# Patient Record
Sex: FEMALE | Race: WHITE
[De-identification: names, ages, dates, MRNs, and addresses within clinical notes are randomized per-mention and may not be internally consistent; named-entity substitution may affect disease eponyms.]

---

## 2017-02-01 ENCOUNTER — HOSPITAL ENCOUNTER (OUTPATIENT)
Dept: HOSPITAL 75 - LDRP | Age: 27
Discharge: HOME | End: 2017-02-01
Attending: HOSPITALIST
Payer: MEDICAID

## 2017-02-01 VITALS — WEIGHT: 205 LBS | HEIGHT: 66.5 IN | BODY MASS INDEX: 32.56 KG/M2

## 2017-02-01 VITALS — DIASTOLIC BLOOD PRESSURE: 66 MMHG | SYSTOLIC BLOOD PRESSURE: 124 MMHG

## 2017-02-01 DIAGNOSIS — O26.93: Primary | ICD-10-CM

## 2017-02-01 DIAGNOSIS — Z3A.30: ICD-10-CM

## 2017-02-01 DIAGNOSIS — R10.2: ICD-10-CM

## 2017-02-01 PROCEDURE — 96361 HYDRATE IV INFUSION ADD-ON: CPT

## 2017-02-01 PROCEDURE — 96360 HYDRATION IV INFUSION INIT: CPT

## 2017-02-01 PROCEDURE — 99213 OFFICE O/P EST LOW 20 MIN: CPT

## 2017-02-02 NOTE — PHYSICIAN QUERY-FINAL DX
DUSTIN LEAHY 2/2/17 0939:


Clinic Account Progress/Dx


Physician Query:


Please give diagnosis


Date of Service


Feb 1, 2017 at 16:06





MARCELO OLIVEIRA MD 2/2/17 1800:


Clinic Account Progress/Dx


DIAGNOSIS:


Diagnosis


Pelvic pressure in pregnancy








DUSTIN LEAHY Feb 2, 2017 09:39


MARCELO OLIVEIRA MD Feb 2, 2017 18:00

## 2017-03-18 ENCOUNTER — HOSPITAL ENCOUNTER (OUTPATIENT)
Dept: HOSPITAL 75 - WSO | Age: 27
Discharge: HOME | End: 2017-03-18
Attending: HOSPITALIST
Payer: MEDICAID

## 2017-03-18 VITALS — WEIGHT: 216.05 LBS | BODY MASS INDEX: 34.31 KG/M2 | HEIGHT: 66.5 IN

## 2017-03-18 VITALS — DIASTOLIC BLOOD PRESSURE: 75 MMHG | SYSTOLIC BLOOD PRESSURE: 144 MMHG

## 2017-03-18 VITALS — DIASTOLIC BLOOD PRESSURE: 81 MMHG | SYSTOLIC BLOOD PRESSURE: 134 MMHG

## 2017-03-18 VITALS — DIASTOLIC BLOOD PRESSURE: 67 MMHG | SYSTOLIC BLOOD PRESSURE: 125 MMHG

## 2017-03-18 VITALS — DIASTOLIC BLOOD PRESSURE: 70 MMHG | SYSTOLIC BLOOD PRESSURE: 123 MMHG

## 2017-03-18 VITALS — SYSTOLIC BLOOD PRESSURE: 125 MMHG | DIASTOLIC BLOOD PRESSURE: 67 MMHG

## 2017-03-18 DIAGNOSIS — Z3A.37: ICD-10-CM

## 2017-03-18 DIAGNOSIS — O47.1: Primary | ICD-10-CM

## 2017-03-18 PROCEDURE — 99213 OFFICE O/P EST LOW 20 MIN: CPT

## 2017-03-18 NOTE — XMS REPORT
Continuity of Care Document

 Created on: 2016



FARHAD SIMS

External Reference #: [id number]

: 1990

Sex: Female



Demographics







 Address  [address] [place]  [address]

 

 Home Phone  [phone]

 

 Preferred Language  English

 

 Marital Status  Unknown

 

 Shinto Affiliation  Unknown

 

 Race  Unknown

 

 Ethnic Group  Unknown





Author







 Author  [facility]

 

 Organization  [facility]

 

 Address  Unknown

 

 Phone  Unavailable







Support







 Name  Relationship  Address  Phone

 

 EVAN ZACARIAS  Caregiver  [facility]

 [address] [place]  [address] [phone]

 

 MARCELO OLIVEIRA MD  Caregiver  [address] [place]  [address]  [phone]

 

 NO, LOCAL PHYSICIAN  Caregiver  Unknown  Unavailable

 

 FREDIS RUIZ DO  Caregiver  [facility]

[place]  [address] [phone]

 

 IVY BURRIS  Next Of Kin  [address] [place]  [address] [phone]







Care Team Providers







 Care Team Member Name  Role  Phone

 

 MARCELO OLIVEIRA MD  PCP  [phone]







Insurance Providers







 Payer Name  Policy Number  Subscriber Name  Relationship

 

 Self Pay     Farhad Sims  18 Self / Same As Patient







Advance Directives







 Directive  Response  Recorded Date/Time

 

 Advance Directives  No  08/15/16 8:25pm

 

 Health Care Power of   No  08/15/16 8:25pm







Chief Complaint and Reason for Visit







 Chief Complaint  Cough/Cold/Flu Symptoms

 

 Reason for Visit  Upper respiratory infection







Problems

Active Problems







 Medical Problem  Onset Date  Status

 

 Lower back pain  Unknown  Acute

 

 Pelvic pain  Unknown  Acute

 

 Upper respiratory infection  Unknown  Acute

 

 Urinary tract infection  Unknown  Acute







Medications

Current Home Medications







 Medication  Dose  Units  Route  Directions  Days/Qty  Instructions  Start Date

 

 Sertraline Hcl 50 Mg                    08

 

 [Implanon ]                    08

 

 Cephalexin 500 Mg  500  Mg  Oral  Four Times Daily  28     08/15/16

 

 Azithromycin 250 Mg  250  Mg  Oral  As Directed  6  2 tablets by mouth on day 1
, then 1 tablet daily on each of the following 4 days  16

 

 Diphenhydramine Hcl 25 Mg  25  Mg  Oral  Every 6 Hours as needed for 
Congestion  30     16







Social History







 Social History Problem  Response  Recorded Date/Time

 

 Alcohol Use  Denies Use  2016 2:33pm

 

 Recreational Drug Use  No  2016 2:33pm

 

 Recent Foreign Travel  No  2016 7:38pm

 

 Recent Infectious Disease Exposure  No  2016 7:38pm

 

 Hospitalization with Isolation  Denies  2016 7:38pm

 

 Type Used  Cigarettes  08/15/2016 8:25pm

 

 Recent Hopitalizations  No  2016 7:41pm

 

 Hospitalization with Isolation  Denies  2016 7:38pm







Hospital Discharge Instructions

No hospital discharge instructions.



Plan of Care







 Discharge Date  16 8:16pm

 

 Disposition  01 HOME, SELF-CARE

 

 Condition at Discharge  Improved

 

 Instructions/Education Provided  Upper Respiratory Infection (ED)

 

 Prescriptions  See Medication Section

 

 Referrals  MARCELO OLIVEIRA MD - Primary Care Physician





NO,LOCAL PHYSICIAN - Primary Care Physician

 

 Additional Instructions/Education  1.  Medication as directed  

 2.  Follow-up with your obstetrician next week  

 3.  All discharge instructions reviewed with patient and/or family. Voiced 

understanding.







Functional Status

No functional status results.



Allergies, Adverse Reactions, Alerts

No known allergies.



Immunizations

No immunization records.



Vital Signs

Acute Vital Signs





 Vital  Response  Date/Time

 

 Temperature (Fahrenheit)  98.9 degrees F (97.6 - 99.5)  08/15/2016 9:22pm

 

 Temperature (Calculated Celsius)  37.64353 degrees C (36.4 - 37.5)  08/15/2016 
9:22pm

 

 Temperature Source  Temporal  08/15/2016 9:22pm

 

 Pulse Rate (adult)  101 bpm (60 - 90)  2016 7:38pm

 

 Respiratory Rate  18 bpm (12 - 24)  2016 7:38pm

 

 O2 Sat by Pulse Oximetry  99 % (88 - 100)  2016 7:38pm

 

 Blood Pressure  126/80 mm Hg  2016 7:38pm

 

    Blood Pressure Mean  95 mm Hg  2016 7:38pm

 

 Pain      

 

    Numeric Pain Scale  4  2016 7:38pm

 

 Height (Feet)  5 feet  2016 7:38pm

 

 Height (Inches)  6 inches  2016 7:38pm

 

 Height (Calculated Centimeters)  167.399382 cm  2016 7:38pm

 

 Weight (Pounds)  164 pounds  2016 7:38pm

 

 Weight (Calculated Grams)  32239.780 gm  08/15/2016 8:25pm

 

 Weight (Calculated Kilograms)  74.614762 kilograms  2016 7:38pm

 

 Capillary Refill      

 

    Capillary Refill  Less Than 3 Seconds  2016 7:38pm

 

 Height  5 ft 6 in   

 

 Weight  164 lb   

 

 Body Mass Index  26.5 kg/m^2   







Results

Laboratory Results







 Test Name  Result  Units  Flags  Reference  Collection Date/Time  Result Date/
Time  Comments

 

 Urine Color  YELLOW           08/15/2016 8:24pm  08/15/2016 8:44pm   

 

 Urine Clarity  CLEAR           08/15/2016 8:24pm  08/15/2016 8:44pm   

 

 Urine pH  7        5-9  08/15/2016 8:24pm  08/15/2016 8:44pm   

 

 Urine Specific Gravity  1.005     *  1.016-1.022  08/15/2016 8:24pm  08/15/
2016 8:44pm   

 

 Urine Protein  1+     *  NEGATIVE  08/15/2016 8:24pm  08/15/2016 8:44pm   

 

 Urine Glucose (UA)  NEGATIVE        NEGATIVE  08/15/2016 8:24pm  08/15/2016 8:
44pm   

 

 Urine RBC (Auto)  5+     *  NEGATIVE  08/15/2016 8:24pm  08/15/2016 8:44pm   

 

 Urine Ketones  NEGATIVE        NEGATIVE  08/15/2016 8:24pm  08/15/2016 8:44pm 
  

 

 Urine Nitrite  NEGATIVE        NEGATIVE  08/15/2016 8:24pm  08/15/2016 8:44pm 
  

 

 Urine Bilirubin  NEGATIVE        NEGATIVE  08/15/2016 8:24pm  08/15/2016 8:
44pm   

 

 Urine Urobilinogen  NORMAL  MG/DL     NORMAL  08/15/2016 8:24pm  08/15/2016 8:
44pm   

 

 Urine Leukocyte Esterase  3+     *  NEGATIVE  08/15/2016 8:24pm  08/15/2016 8:
44pm   

 

 Urine RBC  2-5  /HPF  *     08/15/2016 8:24pm  08/15/2016 8:44pm   

 

 Urine WBC  25-50  /HPF  *     08/15/2016 8:24pm  08/15/2016 8:44pm   

 

 Urine Bacteria  TRACE  /HPF        08/15/2016 8:24pm  08/15/2016 8:44pm   

 

 Urine Squamous Epithelial Cells  0-2  /HPF        08/15/2016 8:24pm  08/15/
2016 8:44pm   

 

 Urine Crystals  NONE  /LPF        08/15/2016 8:24pm  08/15/2016 8:44pm   

 

 Urine Casts  NONE  /LPF        08/15/2016 8:24pm  08/15/2016 8:44pm   

 

 Urine Mucus  NEGATIVE  /LPF        08/15/2016 8:24pm  08/15/2016 8:44pm   

 

 Urine Culture Indicated  YES           08/15/2016 8:24pm  08/15/2016 8:44pm   





Microbiology Results







 Procedure  Source  Result  Collection Date/Time  Result Date/Time

 

 Urine Culture  Urine, Clean Catch  PROTEUS MIRABILIS  08/15/2016 8:24pm  2016 1:28pm







Procedures

No known history of procedures.



Encounters







 Encounter  Location  Arrival/Admit Date  Discharge/Depart Date  Attending 
Provider

 

 Departed Emergency Room  [facility]  16 7:08pm   8:16pm  EVAN ZACARIAS

 

 Departed Emergency Room  [facility]  08/15/16 8:19pm  08/15
/16 9:22pm  ANGEL WASHINGTON MD











 Recent Diagnosis

## 2017-03-20 NOTE — PHYSICIAN QUERY-FINAL DX
FAWAD HERRING 3/20/17 1443:


Clinic Account Progress/Dx


Physician Query:


Please give diagnosis


Date of Service


Mar 18, 2017 at 16:07





MARCELO OLIVEIRA MD 3/21/17 0924:


Clinic Account Progress/Dx


DIAGNOSIS:


Diagnosis


Contractions, third trimester pregnancy








FAWAD HERRING Mar 20, 2017 14:43


MARCELO OLIVEIRA MD Mar 21, 2017 09:24

## 2017-03-24 ENCOUNTER — HOSPITAL ENCOUNTER (OUTPATIENT)
Dept: HOSPITAL 75 - WSO | Age: 27
Discharge: HOME | End: 2017-03-24
Attending: OBSTETRICS & GYNECOLOGY
Payer: MEDICAID

## 2017-03-24 VITALS — DIASTOLIC BLOOD PRESSURE: 70 MMHG | SYSTOLIC BLOOD PRESSURE: 124 MMHG

## 2017-03-24 VITALS — HEIGHT: 66.5 IN | BODY MASS INDEX: 35.33 KG/M2 | WEIGHT: 222.5 LBS

## 2017-03-24 DIAGNOSIS — Z3A.38: ICD-10-CM

## 2017-03-24 DIAGNOSIS — O47.1: Primary | ICD-10-CM

## 2017-03-24 LAB
BILIRUB UR QL STRIP: NEGATIVE
KETONES UR QL STRIP: NEGATIVE
LEUKOCYTE ESTERASE UR QL STRIP: NEGATIVE
NITRITE UR QL STRIP: NEGATIVE
PH UR STRIP: 6 [PH] (ref 5–9)
PROT UR QL STRIP: NEGATIVE
SP GR UR STRIP: 1.01 (ref 1.02–1.02)
SQUAMOUS #/AREA URNS HPF: (no result) /HPF
UROBILINOGEN UR-MCNC: NORMAL MG/DL
WBC #/AREA URNS HPF: (no result) /HPF

## 2017-03-24 PROCEDURE — 99213 OFFICE O/P EST LOW 20 MIN: CPT

## 2017-03-24 PROCEDURE — 81000 URINALYSIS NONAUTO W/SCOPE: CPT

## 2017-03-27 NOTE — PHYSICIAN QUERY-FINAL DX
FAWAD HERRING 3/27/17 1351:


Clinic Account Progress/Dx


Physician Query:


Please give diagnosis


Date of Service


Mar 24, 2017 at 00:43





KAY NEWMAN DO 3/27/17 1641:


Clinic Account Progress/Dx


DIAGNOSIS:


Diagnosis


38 week IUP


Uterine Contractions











FAWAD HERRING Mar 27, 2017 13:51


KAY NEWMAN DO Mar 27, 2017 16:41

## 2017-04-09 ENCOUNTER — HOSPITAL ENCOUNTER (INPATIENT)
Dept: HOSPITAL 75 - LDRP | Age: 27
LOS: 3 days | Discharge: HOME | End: 2017-04-12
Attending: HOSPITALIST | Admitting: HOSPITALIST
Payer: MEDICAID

## 2017-04-09 VITALS — SYSTOLIC BLOOD PRESSURE: 123 MMHG | DIASTOLIC BLOOD PRESSURE: 58 MMHG

## 2017-04-09 VITALS — DIASTOLIC BLOOD PRESSURE: 62 MMHG | SYSTOLIC BLOOD PRESSURE: 127 MMHG

## 2017-04-09 VITALS — DIASTOLIC BLOOD PRESSURE: 74 MMHG | SYSTOLIC BLOOD PRESSURE: 128 MMHG

## 2017-04-09 VITALS — SYSTOLIC BLOOD PRESSURE: 130 MMHG | DIASTOLIC BLOOD PRESSURE: 71 MMHG

## 2017-04-09 VITALS — DIASTOLIC BLOOD PRESSURE: 76 MMHG | SYSTOLIC BLOOD PRESSURE: 131 MMHG

## 2017-04-09 VITALS — WEIGHT: 225 LBS | BODY MASS INDEX: 35.73 KG/M2 | HEIGHT: 66.5 IN

## 2017-04-09 VITALS — DIASTOLIC BLOOD PRESSURE: 65 MMHG | SYSTOLIC BLOOD PRESSURE: 123 MMHG

## 2017-04-09 VITALS — DIASTOLIC BLOOD PRESSURE: 66 MMHG | SYSTOLIC BLOOD PRESSURE: 117 MMHG

## 2017-04-09 VITALS — DIASTOLIC BLOOD PRESSURE: 70 MMHG | SYSTOLIC BLOOD PRESSURE: 126 MMHG

## 2017-04-09 DIAGNOSIS — O48.0: Primary | ICD-10-CM

## 2017-04-09 DIAGNOSIS — O99.013: ICD-10-CM

## 2017-04-09 DIAGNOSIS — Z87.891: ICD-10-CM

## 2017-04-09 DIAGNOSIS — Z3A.40: ICD-10-CM

## 2017-04-09 DIAGNOSIS — D50.9: ICD-10-CM

## 2017-04-09 LAB
BASOPHILS # BLD AUTO: 0 10^3/UL (ref 0–0.1)
BASOPHILS NFR BLD AUTO: 0 % (ref 0–10)
EOSINOPHIL # BLD AUTO: 0.3 10^3/UL (ref 0–0.3)
EOSINOPHIL NFR BLD AUTO: 2 % (ref 0–10)
ERYTHROCYTE [DISTWIDTH] IN BLOOD BY AUTOMATED COUNT: 14 % (ref 10–14.5)
LYMPHOCYTES # BLD AUTO: 2.2 X 10^3 (ref 1–4)
LYMPHOCYTES NFR BLD AUTO: 18 % (ref 12–44)
MCH RBC QN AUTO: 26 PG (ref 25–34)
MCHC RBC AUTO-ENTMCNC: 32 G/DL (ref 32–36)
MCV RBC AUTO: 83 FL (ref 80–99)
MONOCYTES # BLD AUTO: 1.1 X 10^3 (ref 0–1)
MONOCYTES NFR BLD AUTO: 9 % (ref 0–12)
NEUTROPHILS # BLD AUTO: 8.7 X 10^3 (ref 1.8–7.8)
NEUTROPHILS NFR BLD AUTO: 71 % (ref 42–75)
PLATELET # BLD: 209 10^3/UL (ref 130–400)
PMV BLD AUTO: 12.3 FL (ref 7.4–10.4)
RBC # BLD AUTO: 4.06 10^6/UL (ref 4.35–5.85)
WBC # BLD AUTO: 12.3 10^3/UL (ref 4.3–11)

## 2017-04-09 PROCEDURE — 36415 COLL VENOUS BLD VENIPUNCTURE: CPT

## 2017-04-09 PROCEDURE — 88307 TISSUE EXAM BY PATHOLOGIST: CPT

## 2017-04-09 PROCEDURE — 86850 RBC ANTIBODY SCREEN: CPT

## 2017-04-09 PROCEDURE — 86901 BLOOD TYPING SEROLOGIC RH(D): CPT

## 2017-04-09 PROCEDURE — 86900 BLOOD TYPING SEROLOGIC ABO: CPT

## 2017-04-09 PROCEDURE — 85025 COMPLETE CBC W/AUTO DIFF WBC: CPT

## 2017-04-09 RX ADMIN — SODIUM CHLORIDE, SODIUM LACTATE, POTASSIUM CHLORIDE, AND CALCIUM CHLORIDE SCH MLS/HR: 600; 310; 30; 20 INJECTION, SOLUTION INTRAVENOUS at 22:10

## 2017-04-09 RX ADMIN — MISOPROSTOL SCH MCG: 100 TABLET ORAL at 21:05

## 2017-04-09 RX ADMIN — SODIUM CHLORIDE, SODIUM LACTATE, POTASSIUM CHLORIDE, AND CALCIUM CHLORIDE SCH MLS/HR: 600; 310; 30; 20 INJECTION, SOLUTION INTRAVENOUS at 20:55

## 2017-04-10 VITALS — DIASTOLIC BLOOD PRESSURE: 52 MMHG | SYSTOLIC BLOOD PRESSURE: 109 MMHG

## 2017-04-10 VITALS — DIASTOLIC BLOOD PRESSURE: 65 MMHG | SYSTOLIC BLOOD PRESSURE: 135 MMHG

## 2017-04-10 VITALS — DIASTOLIC BLOOD PRESSURE: 56 MMHG | SYSTOLIC BLOOD PRESSURE: 115 MMHG

## 2017-04-10 VITALS — DIASTOLIC BLOOD PRESSURE: 57 MMHG | SYSTOLIC BLOOD PRESSURE: 109 MMHG

## 2017-04-10 VITALS — DIASTOLIC BLOOD PRESSURE: 72 MMHG | SYSTOLIC BLOOD PRESSURE: 120 MMHG

## 2017-04-10 VITALS — DIASTOLIC BLOOD PRESSURE: 58 MMHG | SYSTOLIC BLOOD PRESSURE: 113 MMHG

## 2017-04-10 VITALS — DIASTOLIC BLOOD PRESSURE: 61 MMHG | SYSTOLIC BLOOD PRESSURE: 122 MMHG

## 2017-04-10 VITALS — SYSTOLIC BLOOD PRESSURE: 129 MMHG | DIASTOLIC BLOOD PRESSURE: 68 MMHG

## 2017-04-10 VITALS — SYSTOLIC BLOOD PRESSURE: 117 MMHG | DIASTOLIC BLOOD PRESSURE: 79 MMHG

## 2017-04-10 VITALS — SYSTOLIC BLOOD PRESSURE: 141 MMHG | DIASTOLIC BLOOD PRESSURE: 86 MMHG

## 2017-04-10 VITALS — SYSTOLIC BLOOD PRESSURE: 111 MMHG | DIASTOLIC BLOOD PRESSURE: 75 MMHG

## 2017-04-10 VITALS — DIASTOLIC BLOOD PRESSURE: 56 MMHG | SYSTOLIC BLOOD PRESSURE: 114 MMHG

## 2017-04-10 VITALS — SYSTOLIC BLOOD PRESSURE: 115 MMHG | DIASTOLIC BLOOD PRESSURE: 60 MMHG

## 2017-04-10 VITALS — DIASTOLIC BLOOD PRESSURE: 75 MMHG | SYSTOLIC BLOOD PRESSURE: 133 MMHG

## 2017-04-10 VITALS — SYSTOLIC BLOOD PRESSURE: 108 MMHG | DIASTOLIC BLOOD PRESSURE: 55 MMHG

## 2017-04-10 VITALS — DIASTOLIC BLOOD PRESSURE: 60 MMHG | SYSTOLIC BLOOD PRESSURE: 127 MMHG

## 2017-04-10 VITALS — SYSTOLIC BLOOD PRESSURE: 124 MMHG | DIASTOLIC BLOOD PRESSURE: 56 MMHG

## 2017-04-10 VITALS — SYSTOLIC BLOOD PRESSURE: 119 MMHG | DIASTOLIC BLOOD PRESSURE: 56 MMHG

## 2017-04-10 VITALS — SYSTOLIC BLOOD PRESSURE: 128 MMHG | DIASTOLIC BLOOD PRESSURE: 80 MMHG

## 2017-04-10 VITALS — SYSTOLIC BLOOD PRESSURE: 112 MMHG | DIASTOLIC BLOOD PRESSURE: 60 MMHG

## 2017-04-10 VITALS — SYSTOLIC BLOOD PRESSURE: 114 MMHG | DIASTOLIC BLOOD PRESSURE: 56 MMHG

## 2017-04-10 VITALS — SYSTOLIC BLOOD PRESSURE: 117 MMHG | DIASTOLIC BLOOD PRESSURE: 62 MMHG

## 2017-04-10 VITALS — DIASTOLIC BLOOD PRESSURE: 62 MMHG | SYSTOLIC BLOOD PRESSURE: 121 MMHG

## 2017-04-10 RX ADMIN — SODIUM CHLORIDE, SODIUM LACTATE, POTASSIUM CHLORIDE, AND CALCIUM CHLORIDE SCH MLS/HR: 600; 310; 30; 20 INJECTION, SOLUTION INTRAVENOUS at 08:14

## 2017-04-10 RX ADMIN — SODIUM CHLORIDE, SODIUM LACTATE, POTASSIUM CHLORIDE, AND CALCIUM CHLORIDE SCH MLS/HR: 600; 310; 30; 20 INJECTION, SOLUTION INTRAVENOUS at 04:52

## 2017-04-10 RX ADMIN — VITAMIN A ACETATE, .BETA.-CAROTENE, ASCORBIC ACID, CHOLECALCIFEROL, .ALPHA.-TOCOPHEROL ACETATE, DL-, THIAMINE MONONITRATE, RIBOFLAVIN, NIACINAMIDE, PYRIDOXINE HYDROCHLORIDE, FOLIC ACID, CYANOCOBALAMIN, CALCIUM CARBONATE, FERROUS FUMARATE, ZINC OXIDE, AND CUPRIC OXIDE SCH EA: 2000; 2000; 120; 400; 22; 1.84; 3; 20; 10; 1; 12; 200; 27; 25; 2 TABLET ORAL at 20:12

## 2017-04-10 RX ADMIN — DOCUSATE SODIUM SCH MG: 100 CAPSULE ORAL at 20:03

## 2017-04-10 RX ADMIN — MISOPROSTOL SCH MCG: 100 TABLET ORAL at 03:01

## 2017-04-10 RX ADMIN — IBUPROFEN SCH MG: 600 TABLET ORAL at 20:03

## 2017-04-10 RX ADMIN — Medication SCH ML: at 06:05

## 2017-04-10 RX ADMIN — Medication SCH ML: at 07:13

## 2017-04-10 RX ADMIN — ACETAMINOPHEN AND CODEINE PHOSPHATE PRN TAB: 300; 30 TABLET ORAL at 16:10

## 2017-04-10 NOTE — HISTORY & PHYSICAL-OB
OB - Chief Complaint & HPI


Date


Date of Admission:


Date of Admission:  2017 at 20:15





Chief Complaint/History


OB-Reason for Admission/Chief:  Induction of Labor


Hx :  8


Hx Para:  


Expected Date of Delivery:  2017


Gestational Age in Weeks:  40


Gestational Age in Days:  3


Indication for induction:  other (elective)


Other reason for admission:


Iman is a 27 y/o  @ 40w3d who presented last evening for elective IOL


Denied complaints at that time, non-painful CTX irregularly, fetus active, no 

LOF VB


H/o baby with anencephaly last pregnancy, conceived before starting folate 4mg 

daily but has been taking throughout pregnancy.  H/o tobacco use, quit during 

pregnancy.  UTI this pregnancy with neg ELAINE. 


Does desire postpartum BTL, cannot perform here, will be referred to Dr. Serrano.


History of Labs


A+


Antibody neg


RI


Hep B/C neg


RPR NR 


HIV neg


GC/CT neg/neg


GBS neg


TSH normal


QS normal


20 wk sono normal other than limited views of spine; repeated 4 weeks later 

with normal appearing spine





Allergies and Home Medications


Allergies


Coded Allergies:  


     No Known Drug Allergies (Verified , 08)





Home Medications


Folic Acid 0.4 Mg Tablet, 0.4 MG PO DAILY, (Reported)


Omeprazole 20 Mg Tablet.dr, 20 MG PO DAILY, (Reported)


Prenatal Vit/Iron Fumarate/FA 1 Each Tablet, 1 EACH PO DAILY, (Reported)





OB - History


Hx of Present Pregnancy


Prenatal Care:  Yes


Ultrasounds:  Normal mid trimester US


Obstetrical Complications:  Other (h/o baby with anencephaly, normal msAFP this 

pregnancy and normal second trimester ultrasound, on folate 4mg daily)


Medical Complications:  Other (tobacco use (ceased), h/o UTI)





Prenatal Information


Pregnancy Induced Hypertension:  No


Maternal Gestational Diabetes:  No


Postpartum Hemorrhage:  No





Obstetrical History


Hx :  8


Hx Para:  





Delivery History


Hx Dystocia:  No


Hx Forceps Assisted Delivery:  No


Hx Vacuum Extraction Assisted:  No


Hx Placenta Abnormality:  No


Hx Fetal Distress:  No


Hx Large For Gestational Age I:  Yes


Hx Small for Gestational Age I:  No


Hx  Section:  No


Hx Vaginal Delivery Post C-Sec:  No


Hx Blood Disorders:  No


Adverse Rxn to Tranfusion:  No





Patient Past Medical History





see above





Social History/Family History


HIV/AIDS:  No


Recent Infectious Disease Expo:  No


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


Alcohol Use:  Denies Use


Recreational Drug Use:  No


Smoking Cessation:  Former smoker





Immunizations


Hepatitis A:  Yes


Hepatitis B:  Yes


Tetanus Booster (TDap):  Less than 5yrs


Date of Influenza Vaccine:  2016


Rubella:  immune


RPR/VDRL:  Negative


GBS Status:  Negative


HBsAG:  Negative





OB - Admission Exam


Physical Exam


Vitals:





Vital Signs








 4/10/17 4/10/17





 06:37 07:05


 


Temp  97.6


 


Pulse  70


 


Resp  18


 


B/P (MAP) 114/56 


 


Pulse Ox  97


 


O2 Delivery  Room Air








HEENT:  NCAT


Heart:  Rhythm Normal


Lungs:  Clear


Abdomen:  Gravid


Extremities:  Normal


Reflexes:  Normal


Cervical Dilatation:  9cm


Effacement:  100%


Station:  +1


Membranes:  Ruptured


Amniotic Fluid:  Thin Meconium


Fetal Heart Rate:  130's


Accelerations:  Accelerations Present


Decelerations:  Variable Decelerations (occasionally with ctx, hayley to 90)


Short Term Variability:  Present


Long Term Variability:  Average (6-25)


Contractions on Admission:  >10 Minutes Apart





Cortez Scoring Tool (Modified)


Dilation (cm):  1-2cm (1)


Effacement (%):  51-79%  (2)


Fetal Descent/Station:  -3        (0)


Cervix Consistency:  Soft      (2)


Cervix Position:  Posterior  (0)


Add 1 point for:  Each previous vaginal delivery (1)


Cortez Score:  8


Labs





Laboratory Tests








Test


  17


20:55 Range/Units


 


 


White Blood Count


  12.3 H


  4.3-11.0


10^3/uL


 


Red Blood Count


  4.06 L


  4.35-5.85


10^6/uL


 


Hemoglobin 10.7 L 11.5-16.0  G/DL


 


Hematocrit 34 L 35-52  %


 


Mean Corpuscular Volume 83  80-99  FL


 


Mean Corpuscular Hemoglobin 26  25-34  PG


 


Mean Corpuscular Hemoglobin


Concent 32 


  32-36  G/DL


 


 


Red Cell Distribution Width 14.0  10.0-14.5  %


 


Platelet Count


  209 


  130-400


10^3/uL


 


Mean Platelet Volume 12.3 H 7.4-10.4  FL


 


Neutrophils (%) (Auto) 71  42-75  %


 


Lymphocytes (%) (Auto) 18  12-44  %


 


Monocytes (%) (Auto) 9  0-12  %


 


Eosinophils (%) (Auto) 2  0-10  %


 


Basophils (%) (Auto) 0  0-10  %


 


Neutrophils # (Auto) 8.7 H 1.8-7.8  X 10^3


 


Lymphocytes # (Auto) 2.2  1.0-4.0  X 10^3


 


Monocytes # (Auto) 1.1 H 0.0-1.0  X 10^3


 


Eosinophils # (Auto)


  0.3 


  0.0-0.3


10^3/uL


 


Basophils # (Auto)


  0.0 


  0.0-0.1


10^3/uL











OB - Assessment/Plan/Diagnosis


Plan


Plan:  Induction


Other Plan


27 y/o  @ 40w3d here for elective IOL, GBS neg


H/o infant with anencephaly, on folate 4mg daily throughout pregnancy (although 

not optimally pre-conceptually as she became pregnant quickly after Mirena IUD 

removal despite counseling), normal ultrasound, normal msAFP


Rh+ RI


H/o UTI this pregnancy with neg ELAINE


H/o tobacco use (ceased during pregnancy)





ASVD


Epidural in place


Peds Dr. Oleksandr OLIVEIRA,MARCELO COSBY MD Apr 10, 2017 09:17

## 2017-04-10 NOTE — DISCHARGE INST-WOMEN'S SERVICE
Discharge Inst-Women's Serv


Depart Medication/Instructions


New, Converted or Re-Newed RX:  RX on Chart


Final Diagnosis


Elective IOL, TIUP, 





Consults/Follow Up


Additional Follow Up:  Yes


Orders/Referrals


6 weeks with Dr. Garcia





Activity


Activity:  Activity as Tolerated


Driving Instructions:  You May Drive


NO SMOKING:  NO SMOKING


Nothing Inside Vagina:  No Douching, No [place], No Tampons





Diet


Discharge Diet:  No Restrictions


Symptoms to Report to :  Swelling Increased, Pain Increased, Fever Over 101 

Degrees F, Pain/Pressure in Chest, Vaginal Bleeding Increase, Dizziness/Fainting

, Nausea/Vomiting, Shortness of Breath


For Any Problems or Questions:  Contact Your Physician, Go to Emergency Room











MARCELO GARCIA MD Apr 10, 2017 09:43

## 2017-04-10 NOTE — OB LABOR & DELIVERY RECORD
Vag Delivery Note


Vag Delivery Note


Date of Delivery: 4/10/17 





Preoperative Diagnosis: Iman Sims  is a 25 y/o  @ 40w3d with 

elective IOL, GBS neg, h/o baby with anencephaly, h/o tobacco use, h/o UTI this 

pregnancy





Postoperative Diagnosis: Same





Surgeon: Sarai Garcia MD 





Assistant: Albina Diego MS4





Anesthesia: Epidural





Delivery Type: Spontaneous vaginal delivery





Findings: 


Viable female infant, apgars 6/8, weight 4430g


Lacerations: none


Intact placenta with 3 vessel cord. Nuchal cord x1, body cord x 1 (left arm), 

true knot in cord





Estimated Blood Loss: 250 ml





Complications: None





Condition: Stable





Description of Procedure:


The patient is a 25 y/o  who presented @ 40w2d for scheduled elective 

IOL. She was admitted and informed consent was obtained. Her labor course was 

remarkable for cytotec for cervical ripening.  She did receive an epidural.  

Without further induction methods, she progressed to complete dilatation and 

after AROM with thin meconium, began to push. 





She was then set up for delivery. The infant's head was delivered 

atraumatically in the occiput anterior position. The shoulders and remainder of 

the infant's body were then delivered without difficulty. Upon delivery, the 

head was held below the level of the perineum and the mouth and nares were bulb 

suctioned. The cord was doubly clamped and cut and the infant was handed off to 

the pediatric staff. An intact placenta with 3-vessel cord delivered via 

Leo and there was found to be minimal bleeding.  Vigorous fundal massage 

was performed and the fundus was found to be firm. IV oxytocin was given. 

Examination of the vagina and perineum revealed no lacerations.  Sponge and 

instrument counts were correct. Mom and baby were both in stable condition in 

the labor suite.





Vitals - Labs


Vital Signs - I&O





Vital Signs








  Date Time  Temp Pulse Resp B/P (MAP) Pulse Ox O2 Delivery O2 Flow Rate FiO2


 


4/10/17 07:05 97.6 70 18  97 Room Air  


 


4/10/17 06:37  75 18 114/56 96 Room Air  


 


4/10/17 06:15    109/52    


 


4/10/17 05:40  85 22 111/75  Room Air  


 


4/10/17 05:25 96.2       


 


4/10/17 04:52 97.6 78 20 141/86  Room Air  


 


4/10/17 04:20   20   Room Air  


 


4/10/17 03:45  77 20 135/65  Room Air  


 


4/10/17 03:30      Room Air  


 


4/10/17 03:25  81 20 124/56  Room Air  


 


4/10/17 03:01 97.0  20   Room Air  


 


4/10/17 02:00  85 20 115/56  Room Air  


 


4/10/17 01:15        


 


4/10/17 01:05 97.1 78 18 122/61  Room Air  


 


4/10/17 00:30  77 18 115/60  Room Air  


 


4/10/17 00:30      Room Air  


 


17 23:30      Room Air  


 


17 23:30  77 18 123/58  Room Air  


 


17 22:50  82 18 131/76  Room Air  


 


17 22:30  81 20 130/71  Room Air  


 


17 22:00 98.4 85 20 123/65  Room Air  


 


17 21:45  79 18 126/70  Room Air  


 


17 21:30  86 20 117/66  Room Air  


 


17 21:10  86 20 127/62  Room Air  


 


17 20:45      Room Air  


 


17 20:25 98.1 94 20 128/74  Room Air  














I & O 


 


 4/10/17





 06:59


 


Intake Total 2200 ml


 


Balance 2200 ml











Labs


Laboratory Tests


17 20:55: 


White Blood Count 12.3H, Red Blood Count 4.06L, Hemoglobin 10.7L, Hematocrit 34L

, Mean Corpuscular Volume 83, Mean Corpuscular Hemoglobin 26, Mean Corpuscular 

Hemoglobin Concent 32, Red Cell Distribution Width 14.0, Platelet Count 209, 

Mean Platelet Volume 12.3H, Neutrophils (%) (Auto) 71, Lymphocytes (%) (Auto) 18

, Monocytes (%) (Auto) 9, Eosinophils (%) (Auto) 2, Basophils (%) (Auto) 0, 

Neutrophils # (Auto) 8.7H, Lymphocytes # (Auto) 2.2, Monocytes # (Auto) 1.1H, 

Eosinophils # (Auto) 0.3, Basophils # (Auto) 0.0











SARAI GARCIA MD Apr 10, 2017 09:28

## 2017-04-11 VITALS — DIASTOLIC BLOOD PRESSURE: 69 MMHG | SYSTOLIC BLOOD PRESSURE: 118 MMHG

## 2017-04-11 VITALS — SYSTOLIC BLOOD PRESSURE: 111 MMHG | DIASTOLIC BLOOD PRESSURE: 70 MMHG

## 2017-04-11 VITALS — DIASTOLIC BLOOD PRESSURE: 83 MMHG | SYSTOLIC BLOOD PRESSURE: 135 MMHG

## 2017-04-11 VITALS — SYSTOLIC BLOOD PRESSURE: 101 MMHG | DIASTOLIC BLOOD PRESSURE: 57 MMHG

## 2017-04-11 VITALS — SYSTOLIC BLOOD PRESSURE: 113 MMHG | DIASTOLIC BLOOD PRESSURE: 67 MMHG

## 2017-04-11 LAB
BASOPHILS # BLD AUTO: 0 10^3/UL (ref 0–0.1)
BASOPHILS NFR BLD AUTO: 0 % (ref 0–10)
EOSINOPHIL # BLD AUTO: 0.2 10^3/UL (ref 0–0.3)
EOSINOPHIL NFR BLD AUTO: 2 % (ref 0–10)
ERYTHROCYTE [DISTWIDTH] IN BLOOD BY AUTOMATED COUNT: 14.3 % (ref 10–14.5)
LYMPHOCYTES # BLD AUTO: 2.5 X 10^3 (ref 1–4)
LYMPHOCYTES NFR BLD AUTO: 23 % (ref 12–44)
MCH RBC QN AUTO: 26 PG (ref 25–34)
MCHC RBC AUTO-ENTMCNC: 31 G/DL (ref 32–36)
MCV RBC AUTO: 85 FL (ref 80–99)
MONOCYTES # BLD AUTO: 0.9 X 10^3 (ref 0–1)
MONOCYTES NFR BLD AUTO: 8 % (ref 0–12)
NEUTROPHILS # BLD AUTO: 7.4 X 10^3 (ref 1.8–7.8)
NEUTROPHILS NFR BLD AUTO: 67 % (ref 42–75)
PLATELET # BLD: 158 10^3/UL (ref 130–400)
PMV BLD AUTO: 11.8 FL (ref 7.4–10.4)
RBC # BLD AUTO: 3.62 10^6/UL (ref 4.35–5.85)
WBC # BLD AUTO: 11.1 10^3/UL (ref 4.3–11)

## 2017-04-11 RX ADMIN — ACETAMINOPHEN AND CODEINE PHOSPHATE PRN TAB: 300; 30 TABLET ORAL at 06:52

## 2017-04-11 RX ADMIN — IBUPROFEN SCH MG: 600 TABLET ORAL at 09:48

## 2017-04-11 RX ADMIN — IBUPROFEN SCH MG: 600 TABLET ORAL at 02:11

## 2017-04-11 RX ADMIN — IBUPROFEN SCH MG: 600 TABLET ORAL at 16:04

## 2017-04-11 RX ADMIN — DOCUSATE SODIUM SCH MG: 100 CAPSULE ORAL at 09:48

## 2017-04-11 RX ADMIN — IBUPROFEN SCH MG: 600 TABLET ORAL at 23:07

## 2017-04-11 RX ADMIN — FERROUS SULFATE TAB 325 MG (65 MG ELEMENTAL FE) SCH MG: 325 (65 FE) TAB at 09:49

## 2017-04-11 RX ADMIN — ACETAMINOPHEN AND CODEINE PHOSPHATE PRN TAB: 300; 30 TABLET ORAL at 20:30

## 2017-04-11 RX ADMIN — DOCUSATE SODIUM SCH MG: 100 CAPSULE ORAL at 20:30

## 2017-04-11 RX ADMIN — ACETAMINOPHEN AND CODEINE PHOSPHATE PRN TAB: 300; 30 TABLET ORAL at 00:52

## 2017-04-11 RX ADMIN — ACETAMINOPHEN AND CODEINE PHOSPHATE PRN TAB: 300; 30 TABLET ORAL at 13:21

## 2017-04-11 RX ADMIN — VITAMIN A ACETATE, .BETA.-CAROTENE, ASCORBIC ACID, CHOLECALCIFEROL, .ALPHA.-TOCOPHEROL ACETATE, DL-, THIAMINE MONONITRATE, RIBOFLAVIN, NIACINAMIDE, PYRIDOXINE HYDROCHLORIDE, FOLIC ACID, CYANOCOBALAMIN, CALCIUM CARBONATE, FERROUS FUMARATE, ZINC OXIDE, AND CUPRIC OXIDE SCH EA: 2000; 2000; 120; 400; 22; 1.84; 3; 20; 10; 1; 12; 200; 27; 25; 2 TABLET ORAL at 21:36

## 2017-04-11 NOTE — POSTPARTUM PROGRESS NOTE
Postpartum Note


Postpartum Note


Postpartum Day # 1





Subjective:


Patient is without complaints. Ambulating, voiding. Tolerating a regular diet 

without nausea or vomiting. Normal lochia. Pain is well controlled with oral 

pain medications. Breast feeding. Reports infant will be staying until tomorrow 

per pediatrician.





Objective:





 VS - Last 72 Hours, by Label








 17





 20:25 20:45 21:10 21:30


 


Temp 98.1   


 


Pulse 94  86 86


 


Resp 20  20 20


 


B/P (MAP) 128/74  127/62 117/66


 


O2 Delivery Room Air Room Air Room Air Room Air


 


    





 17





 21:45 22:00 22:30 22:50


 


Temp  98.4  


 


Pulse 79 85 81 82


 


Resp 18 20 20 18


 


B/P (MAP) 126/70 123/65 130/71 131/76


 


O2 Delivery Room Air Room Air Room Air Room Air


 


    





 4/9/17 4/9/17 4/10/17 4/10/17





 23:30 23:30 00:30 00:30


 


Pulse 77   77


 


Resp 18   18


 


B/P (MAP) 123/58   115/60


 


O2 Delivery Room Air Room Air Room Air Room Air





 4/10/17 4/10/17 4/10/17 4/10/17





 01:05 01:15 02:00 03:01


 


Temp 97.1   97.0


 


Pulse 78  85 


 


Resp 18  20 20


 


B/P (MAP) 122/61  115/56 


 


O2 Delivery Room Air  Room Air Room Air


 


    





 4/10/17 4/10/17 4/10/17 4/10/17





 03:25 03:30 03:45 04:20


 


Pulse 81  77 


 


Resp 20  20 20


 


B/P (MAP) 124/56  135/65 


 


O2 Delivery Room Air Room Air Room Air Room Air





 4/10/17 4/10/17 4/10/17 4/10/17





 04:52 05:25 05:40 06:15


 


Temp 97.6 96.2  


 


Pulse 78  85 


 


Resp 20  22 


 


B/P (MAP) 141/86  111/75 109/52


 


O2 Delivery Room Air  Room Air 


 


    





 4/10/17 4/10/17 4/10/17 4/10/17





 06:37 07:05 07:15 07:30


 


Temp  97.6 97.5 


 


Pulse 75 70 76 72


 


Resp 18 18 18 16


 


B/P (MAP) 114/56  112/60 108/55


 


Pulse Ox 96 97 94 


 


O2 Delivery Room Air Room Air Room Air Room Air


 


    





 4/10/17 4/10/17 4/10/17 4/10/17





 07:45 08:00 08:15 08:30


 


Pulse 78 81 82 81


 


Resp 16 18 18 18


 


B/P (MAP) 109/57 117/62 121/62 127/60


 


O2 Delivery Room Air Room Air Room Air Room Air





 4/10/17 4/10/17 4/10/17 4/10/17





 08:45 09:00 09:15 09:30


 


Temp 97.1 97.2 97.2 97.0


 


Pulse 75 83 83 80


 


Resp 18 18 18 18


 


B/P (MAP) 119/56 114/56 129/68 128/80


 


O2 Delivery Room Air Room Air Room Air Room Air


 


    





 4/10/17 4/10/17 4/10/17 4/10/17





 09:45 11:15 16:00 20:05


 


Temp 96.9 98.0 98.1 97.6


 


Pulse 77 80 83 86


 


Resp 18 18 20 18


 


B/P (MAP) 120/72 113/58 117/79 133/75


 


Pulse Ox    99


 


O2 Delivery Room Air Room Air Room Air Room Air


 


    





 17  





 00:45 04:55  


 


Temp 97.5 96.8  


 


Pulse 84 74  


 


Resp 18 18  


 


B/P (MAP) 135/83 101/57  


 


Pulse Ox 99 99  


 


O2 Delivery Room Air Room Air  











Physical Exam:


General - Alert and oriented, no apparent distress


Abdomen - Soft, appropriately tender to palpation, non-distended, fundus firm 

at umbilicus


Extremities - no edema, negative Cristal's bilaterally 








Laboratory Tests








Test


  17


07:55 Range/Units


 


 


White Blood Count


  11.1 H


  4.3-11.0


10^3/uL


 


Red Blood Count


  3.62 L


  4.35-5.85


10^6/uL


 


Hemoglobin 9.4 L 11.5-16.0  G/DL


 


Hematocrit 31 L 35-52  %


 


Mean Corpuscular Volume 85  80-99  FL


 


Mean Corpuscular Hemoglobin 26  25-34  PG


 


Mean Corpuscular Hemoglobin


Concent 31 L


  32-36  G/DL


 


 


Red Cell Distribution Width 14.3  10.0-14.5  %


 


Platelet Count


  158 


  130-400


10^3/uL


 


Mean Platelet Volume 11.8 H 7.4-10.4  FL


 


Neutrophils (%) (Auto) 67  42-75  %


 


Lymphocytes (%) (Auto) 23  12-44  %


 


Monocytes (%) (Auto) 8  0-12  %


 


Eosinophils (%) (Auto) 2  0-10  %


 


Basophils (%) (Auto) 0  0-10  %


 


Neutrophils # (Auto) 7.4  1.8-7.8  X 10^3


 


Lymphocytes # (Auto) 2.5  1.0-4.0  X 10^3


 


Monocytes # (Auto) 0.9  0.0-1.0  X 10^3


 


Eosinophils # (Auto)


  0.2 


  0.0-0.3


10^3/uL


 


Basophils # (Auto)


  0.0 


  0.0-0.1


10^3/uL











Assessment:


27 y/o  post-partum day # 1, status post spontaneous vaginal delivery.


Recovering well, hemodynamically stable


Acute blood loss anemia on chronic iron deficiency anemia, pp Hgb 9.4





Plan:


Routine postpartum care.


Encourage breast feeding.


Encourage ambulation.


Ferrous sulfate supplementation.


Plan for discharge tomorrow, f/u with me in 6 weeks.  Keep appt with Dr. Serrano for tubal ligation consult.





Vitals - Labs


Vital Signs - I&O





Vital Signs








  Date Time  Temp Pulse Resp B/P (MAP) Pulse Ox O2 Delivery O2 Flow Rate FiO2


 


17 04:55 96.8 74 18 101/57 99 Room Air  


 


17 00:45 97.5 84 18 135/83 99 Room Air  


 


4/10/17 20:05 97.6 86 18 133/75 99 Room Air  


 


4/10/17 16:00 98.1 83 20 117/79  Room Air  


 


4/10/17 11:15 98.0 80 18 113/58  Room Air  


 


4/10/17 09:45 96.9 77 18 120/72  Room Air  


 


4/10/17 09:30 97.0 80 18 128/80  Room Air  


 


4/10/17 09:15 97.2 83 18 129/68  Room Air  


 


4/10/17 09:00 97.2 83 18 114/56  Room Air  


 


4/10/17 08:45 97.1 75 18 119/56  Room Air  


 


4/10/17 08:30  81 18 127/60  Room Air  














I & O 


 


 17





 07:00


 


Intake Total 2000 ml


 


Balance 2000 ml











Labs


Laboratory Tests


17 07:55: 


White Blood Count 11.1H, Red Blood Count 3.62L, Hemoglobin 9.4L, Hematocrit 31L

, Mean Corpuscular Volume 85, Mean Corpuscular Hemoglobin 26, Mean Corpuscular 

Hemoglobin Concent 31L, Red Cell Distribution Width 14.3, Platelet Count 158, 

Mean Platelet Volume 11.8H, Neutrophils (%) (Auto) 67, Lymphocytes (%) (Auto) 23

, Monocytes (%) (Auto) 8, Eosinophils (%) (Auto) 2, Basophils (%) (Auto) 0, 

Neutrophils # (Auto) 7.4, Lymphocytes # (Auto) 2.5, Monocytes # (Auto) 0.9, 

Eosinophils # (Auto) 0.2, Basophils # (Auto) 0.0











MARCELO OLIVEIRA MD 2017 08:28

## 2017-04-11 NOTE — ANESTHESIA-REGIONAL POST-OP
Regional


Patient Condition


Mental Status:  Alert, Oriented x3


Circulation:  Same as Pre-Op


Headache:  Absent


Sensation:  Full Recovery


Motor Block:  Absent





Post Op Complications


Complications


None





Follow Up Care/Instructions


Patient Instructions


None needed.





Anesthesia/Patient Condition


Patient is doing well, no complaints, stable vital signs, no apparent adverse 

anesthesia problems.   


No complications reported per nursing.











NIC SERRANO CRNA Apr 11, 2017 12:31

## 2017-04-12 VITALS — SYSTOLIC BLOOD PRESSURE: 132 MMHG | DIASTOLIC BLOOD PRESSURE: 81 MMHG

## 2017-04-12 VITALS — DIASTOLIC BLOOD PRESSURE: 78 MMHG | SYSTOLIC BLOOD PRESSURE: 124 MMHG

## 2017-04-12 RX ADMIN — FERROUS SULFATE TAB 325 MG (65 MG ELEMENTAL FE) SCH MG: 325 (65 FE) TAB at 09:27

## 2017-04-12 RX ADMIN — IBUPROFEN SCH MG: 600 TABLET ORAL at 04:13

## 2017-04-12 RX ADMIN — IBUPROFEN SCH MG: 600 TABLET ORAL at 09:27

## 2017-04-12 RX ADMIN — ACETAMINOPHEN AND CODEINE PHOSPHATE PRN TAB: 300; 30 TABLET ORAL at 09:30

## 2017-04-12 RX ADMIN — DOCUSATE SODIUM SCH MG: 100 CAPSULE ORAL at 09:27

## 2017-04-12 RX ADMIN — ACETAMINOPHEN AND CODEINE PHOSPHATE PRN TAB: 300; 30 TABLET ORAL at 04:14

## 2017-04-12 NOTE — POSTPARTUM PROGRESS NOTE
Postpartum Note


Postpartum Note


Postpartum Day # 2





Subjective:


Patient is without complaints. Ambulating, voiding. Tolerating a regular diet 

without nausea or vomiting. Normal lochia. Pain is well controlled with oral 

pain medications. Breast feeding. Ready for discharge.





Objective:





 VS - Last 72 Hours, by Label








 17





 20:25 20:45 21:10 21:30


 


Temp 98.1   


 


Pulse 94  86 86


 


Resp 20  20 20


 


B/P (MAP) 128/74  127/62 117/66


 


O2 Delivery Room Air Room Air Room Air Room Air


 


    





 17





 21:45 22:00 22:30 22:50


 


Temp  98.4  


 


Pulse 79 85 81 82


 


Resp 18 20 20 18


 


B/P (MAP) 126/70 123/65 130/71 131/76


 


O2 Delivery Room Air Room Air Room Air Room Air


 


    





 4/9/17 4/9/17 4/10/17 4/10/17





 23:30 23:30 00:30 00:30


 


Pulse 77   77


 


Resp 18   18


 


B/P (MAP) 123/58   115/60


 


O2 Delivery Room Air Room Air Room Air Room Air





 4/10/17 4/10/17 4/10/17 4/10/17





 01:05 01:15 02:00 03:01


 


Temp 97.1   97.0


 


Pulse 78  85 


 


Resp 18  20 20


 


B/P (MAP) 122/61  115/56 


 


O2 Delivery Room Air  Room Air Room Air


 


    





 4/10/17 4/10/17 4/10/17 4/10/17





 03:25 03:30 03:45 04:20


 


Pulse 81  77 


 


Resp 20  20 20


 


B/P (MAP) 124/56  135/65 


 


O2 Delivery Room Air Room Air Room Air Room Air





 4/10/17 4/10/17 4/10/17 4/10/17





 04:52 05:25 05:40 06:15


 


Temp 97.6 96.2  


 


Pulse 78  85 


 


Resp 20  22 


 


B/P (MAP) 141/86  111/75 109/52


 


O2 Delivery Room Air  Room Air 


 


    





 4/10/17 4/10/17 4/10/17 4/10/17





 06:37 07:05 07:15 07:30


 


Temp  97.6 97.5 


 


Pulse 75 70 76 72


 


Resp 18 18 18 16


 


B/P (MAP) 114/56  112/60 108/55


 


Pulse Ox 96 97 94 


 


O2 Delivery Room Air Room Air Room Air Room Air


 


    





 4/10/17 4/10/17 4/10/17 4/10/17





 07:45 08:00 08:15 08:30


 


Pulse 78 81 82 81


 


Resp 16 18 18 18


 


B/P (MAP) 109/57 117/62 121/62 127/60


 


O2 Delivery Room Air Room Air Room Air Room Air





 4/10/17 4/10/17 4/10/17 4/10/17





 08:45 09:00 09:15 09:30


 


Temp 97.1 97.2 97.2 97.0


 


Pulse 75 83 83 80


 


Resp 18 18 18 18


 


B/P (MAP) 119/56 114/56 129/68 128/80


 


O2 Delivery Room Air Room Air Room Air Room Air


 


    





 4/10/17 4/10/17 4/10/17 4/10/17





 09:45 11:15 16:00 20:05


 


Temp 96.9 98.0 98.1 97.6


 


Pulse 77 80 83 86


 


Resp 18 18 20 18


 


B/P (MAP) 120/72 113/58 117/79 133/75


 


Pulse Ox    99


 


O2 Delivery Room Air Room Air Room Air Room Air


 


    





 17





 00:45 04:55 09:40 13:20


 


Temp 97.5 96.8 97.9 98.2


 


Pulse 84 74 77 86


 


Resp 18 18 18 20


 


B/P (MAP) 135/83 101/57 113/67 111/70


 


Pulse Ox 99 99 96 96


 


O2 Delivery Room Air Room Air Room Air Room Air


 


    





 17  





 22:00 04:20  


 


Temp 97.4 97.1  


 


Pulse 79 77  


 


Resp 18 18  


 


B/P (MAP) 118/69 124/78  


 


Pulse Ox 98 96  











Physical Exam:


General - Alert and oriented, no apparent distress


Abdomen - Soft, appropriately tender to palpation, non-distended, fundus firm 

at umbilicus


Extremities - no edema, negative Cristal's bilaterally 





no new labs





Assessment:


25 y/o  post-partum day # 2, status post spontaneous vaginal delivery.


Recovering well, hemodynamically stable


Acute blood loss anemia on chronic iron deficiency anemia, pp Hgb 9.4





Plan:


Routine postpartum care.


Encourage breast feeding.


Encourage ambulation.


Ferrous sulfate supplementation.


Plan for discharge today, f/u with me in 6 weeks.  Keep appt with Dr. Serrano 

for tubal ligation consult (pt reports it is next Monday).





Vitals - Labs


Vital Signs - I&O





Vital Signs








  Date Time  Temp Pulse Resp B/P (MAP) Pulse Ox O2 Delivery O2 Flow Rate FiO2


 


17 04:20 97.1 77 18 124/78 96   


 


17 22:00 97.4 79 18 118/69 98   


 


17 13:20 98.2 86 20 111/70 96 Room Air  


 


17 09:40 97.9 77 18 113/67 96 Room Air  

















MARCELO OLIVEIRA MD 2017 08:05

## 2017-04-19 NOTE — DISCHARGE SUMMARY
Diagnosis/Chief Complaint


Date of Admission


2017 at 20:15


Date of Discharge


2017 at 11:50


Discharge Date:  2017


Admission Diagnosis


Admission Diagnosis


Elective induction of labor





Discharge Diagnosis


Term intrauterine pregnancy, spontaneous vaginal delivery





Reason Hospital Visit


Iman is a 27 y/o  @ 40w3d who presented last evening for elective IOL


Denied complaints at that time, non-painful CTX irregularly, fetus active, no 

LOF VB


H/o baby with anencephaly last pregnancy, conceived before starting folate 4mg 

daily but has been taking throughout pregnancy.  H/o tobacco use, quit during 

pregnancy.  UTI this pregnancy with neg ELAINE. 


Does desire postpartum BTL, cannot perform here, will be referred to Dr. Serrano.





Discharge Summary


Procedures:  


Spontaneous vaginal delivery


Discharge Physical Examination


Allergies:  


Coded Allergies:  


     No Known Drug Allergies (Verified , 08)


Vitals & I&Os


see nursing documentation


General Appearance:  Alert, Oriented X3


Abdominal:  Soft, Other (fundus firm)





Hospital Course


Iman was admitted and underwent IOL


She had a spontaneous vaginal delivery without complications


Her postpartum course was unremarkable and on PPD#2 she was discharged home in 

stable condition





Discharge


Condition at discharge


Stable


Instructions to patient/family


Please see electonic discharge instructions given to patient.


Discharge Medications


Reviewed and agree with Discharge Medication list on patient's Discharge 

Instruction sheet





Clinical Quality Measures


DVT/VTE Risk/Contraindication:


Risk Factor Score Per Nursin


RFS Level Per Nursing on Admit:  2=Moderate











MARCELO OLIVEIRA MD 2017 08:48

## 2019-06-09 ENCOUNTER — HOSPITAL ENCOUNTER (EMERGENCY)
Dept: HOSPITAL 75 - ER | Age: 29
Discharge: HOME | End: 2019-06-09
Payer: COMMERCIAL

## 2019-06-09 VITALS — WEIGHT: 225 LBS | BODY MASS INDEX: 35.73 KG/M2 | HEIGHT: 66.5 IN

## 2019-06-09 VITALS — SYSTOLIC BLOOD PRESSURE: 117 MMHG | DIASTOLIC BLOOD PRESSURE: 71 MMHG

## 2019-06-09 DIAGNOSIS — Z90.49: ICD-10-CM

## 2019-06-09 DIAGNOSIS — Z86.19: ICD-10-CM

## 2019-06-09 DIAGNOSIS — Z80.0: ICD-10-CM

## 2019-06-09 DIAGNOSIS — F41.9: ICD-10-CM
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
## 2019-06-09 DIAGNOSIS — Z23: ICD-10-CM

## 2019-06-09 DIAGNOSIS — Z82.49: ICD-10-CM

## 2019-06-09 DIAGNOSIS — F32.9: ICD-10-CM

## 2019-06-09 DIAGNOSIS — Z80.3: ICD-10-CM

## 2019-06-09 DIAGNOSIS — Z80.1: ICD-10-CM

## 2019-06-09 DIAGNOSIS — Z87.448: ICD-10-CM

## 2019-06-09 DIAGNOSIS — Z90.89: ICD-10-CM

## 2019-06-09 DIAGNOSIS — Z87.891: ICD-10-CM

## 2019-06-09 DIAGNOSIS — S81.011A: Primary | ICD-10-CM

## 2019-06-09 PROCEDURE — 90715 TDAP VACCINE 7 YRS/> IM: CPT

## 2019-06-09 NOTE — ED UPPER EXTREMITY
General


Chief Complaint:  Laceration


Stated Complaint:  R KNEE LAC - DIZZY


Nursing Triage Note:  


PT WAS REMOVING TRASH AND CUT HER LEFT KNEE WITH A PIECE OF GLASS. PT IS NOT UP 


TO DATE ON TDAP.


Nursing Sepsis Screen:  No Definite Risk


Source:  patient


Exam Limitations:  no limitations





History of Present Illness


Date Seen by Provider:  Jun 9, 2019


Time Seen by Provider:  12:22


Initial Comments


28 year old female who presents to ED with laceration to her right knee after 

removing trash bag with broken glass in the bag. has 1cm lac to inner knee.


Onset:  just prior to arrival





Allergies and Home Medications


Allergies


Coded Allergies:  


     No Known Drug Allergies (Verified , 1/30/08)





Home Medications


Acetaminophen with Codeine 1 Each Tablet, 1-2 TAB PO Q6H PRN for Pain


   Prescribed by: MARCELO OLIVEIRA on 4/11/17 0827


Docusate Sodium 100 Mg Capsule, 100 MG PO BID PRN for CONSTIPATION-1ST LINE


   Prescribed by: MARCELO OLIVEIRA on 4/10/17 0944


Ferrous Sulfate 325 Mg Tablet, 325 MG PO DAILY


   Prescribed by: MARCELO OLIVEIRA on 4/10/17 0945


Ibuprofen 600 Mg Tablet, 600 MG PO Q6H


   Prescribed by: MARCELO OLIVEIRA on 4/10/17 0944


Omeprazole 20 Mg Tablet.dr, 20 MG PO DAILY, (Reported)


Prenatal Vit/Iron Fumarate/FA 1 Each Tablet, 1 EACH PO DAILY, (Reported)





Patient Home Medication List


Home Medication List Reviewed:  Yes





Review of Systems


Constitutional:  see HPI; No chills, No fever


Skin:  see HPI, other (laceration to left knee.)





All Other Systems Reviewed


Negative Unless Noted:  Yes





Past Medical-Social-Family Hx


Past Med/Social Hx:  Reviewed Nursing Past Med/Soc Hx


Patient Social History


Alcohol Use:  Denies Use


Recreational Drug Use:  No


Type Used:  Cigarettes


Former Smoker, Quit:  Aug 1, 2016


Recent Foreign Travel:  No


Contact w/Someone Who Travel:  No


Recent Infectious Disease Expo:  No


Recent Hopitalizations:  Yes (OP OB VISITS)


Physical Abuse:  No


Sexual Abuse:  No


Mistreated:  No


Fear:  No





Immunizations Up To Date


Tetanus Booster (TDap):  Less than 5yrs


PED Vaccines UTD:  Yes


Date of Influenza Vaccine:  Nov 7, 2016





Seasonal Allergies


Seasonal Allergies:  Yes





Past Medical History


Surgeries:  Yes


Appendectomy, Gallbladder


Respiratory:  No


Currently Using CPAP:  No


Currently Using BIPAP:  No


Cardiac:  No


Neurological:  Yes


Reproductive Disorders:  No


Female Reproductive Disorders:  Ovarian Cyst


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


HIV/AIDS:  No


Genitourinary:  No


Kidney Infection


Gastrointestinal:  Yes


Gall Bladder Disease


Musculoskeletal:  Yes (slight curvature of spine as child)


Endocrine:  No


HEENT:  No


Loss of Vision:  Denies


Hearing Impairment:  Denies


Cancer:  No


Psychosocial:  Yes


Anxiety, Depression


Integumentary:  No


Blood Disorders:  No


Adverse Reaction/Blood Tranf:  No





Family Medical History


Reviewed Nursing Family Hx





Colon cancer


  GRANDFATHER


Diabetes mellitus


  19 MOTHER


  GRANDFATHER


  GRANDMOTHER


FH: breast cancer


  GRANDMOTHER


FH: depression


  19 FATHER


  19 MOTHER


FH: emphysema


  GRANDFATHER


FH: lung cancer


  GRANDMOTHER


FH: sleep apnea


  19 MOTHER


FH: stroke


  GRANDFATHER


Hepatitis C


  19 FATHER


Hypercholesterolemia


  19 MOTHER


  GRANDFATHER


  GRANDMOTHER


Hypertension


  19 FATHER


  19 MOTHER


  GRANDMOTHER


Myocardial infarction


  GRANDFATHER


Thyroid disease (HYPOTHYROIDISM)


  19 MOTHER


Heart Disease, Cancer, Diabetes, Hypertension





Physical Exam


Vital Signs





Vital Signs - First Documented








 6/9/19 6/9/19





 12:21 13:30


 


Temp 97.6 


 


Pulse 74 


 


Resp 18 


 


B/P (MAP) 117/71 (86) 


 


Pulse Ox  98


 


O2 Delivery Room Air 





Capillary Refill : Less Than 3 Seconds


Height, Weight, BMI


Height: 5'6.50"


Weight: 225lbs. 0.0oz. [id number]; 35.8 BMI


Method:Stated


General Appearance:  WD/WN, no apparent distress


Cardiovascular:  normal peripheral pulses, regular rate, rhythm, no edema, no 

gallop, no JVD, no murmur


Respiratory:  chest non-tender, lungs clear, normal breath sounds, no 

respiratory distress, no accessory muscle use


Neurologic/Psychiatric:  alert, normal mood/affect, oriented x 3


Skin:  normal color, warm/dry, other





Procedures/Interventions





   Wound Location:  Lower Extremities


Other Wound Location


left inner knee


   Wound Length (cm):  1


   Wound's Depth, Shape:  superficial, linear


   Irrigated w/ Saline (ccs):  200


   Anesthesia:  1% Lidocaine


   Suture:  Prolene


   Suture Size:  4-0


   Number of Sutures:  3





Progress/Results/Core Measures


Results/Orders


My Orders





Orders - DEREK DOMINGO


Lidocaine 1% Inj 20 Ml (Xylocaine 1% Inj (6/9/19 12:30)


Dipht,Pertuss(Acell),Tet Adult (Boostrix (6/9/19 12:30)





Medications Given in ED





Vital Signs/I&O











Blood Pressure Mean:                    86











Departure


Impression





   Primary Impression:  


   Laceration


Disposition:  01 HOME, SELF-CARE


Condition:  Stable/Unchanged





Departure-Patient Inst.


Decision time for Depature:  13:22


Referrals:  


NO,LOCAL PHYSICIAN (PCP/Family)


Primary Care Physician


Patient Instructions:  Laceration Repair With Stitches (DC)





Add. Discharge Instructions:  


Watch for signs of increased redness, swelling, drainage, pain. Return back to 

the emergency room in 7 days to have the sutures removed. Follow-up with primary

care provider as needed. Change dressing daily. Return back to the emergency 

room for worsening symptoms or concerns as needed. All discharge instructions 

reviewed with patient and/or family. Voiced understanding.











DEREK DOMINGO                   Jun 9, 2019 13:23

## 2020-04-04 ENCOUNTER — HOSPITAL ENCOUNTER (EMERGENCY)
Dept: HOSPITAL 75 - ER | Age: 30
LOS: 1 days | Discharge: HOME | End: 2020-04-05
Payer: COMMERCIAL

## 2020-04-04 VITALS — HEIGHT: 65.43 IN | BODY MASS INDEX: 29.69 KG/M2 | WEIGHT: 180.34 LBS

## 2020-04-04 DIAGNOSIS — F41.9: ICD-10-CM

## 2020-04-04 DIAGNOSIS — N30.01: Primary | ICD-10-CM

## 2020-04-04 DIAGNOSIS — F32.9: ICD-10-CM

## 2020-04-04 PROCEDURE — 85652 RBC SED RATE AUTOMATED: CPT

## 2020-04-04 PROCEDURE — 84703 CHORIONIC GONADOTROPIN ASSAY: CPT

## 2020-04-04 PROCEDURE — 87430 STREP A AG IA: CPT

## 2020-04-04 PROCEDURE — 83615 LACTATE (LD) (LDH) ENZYME: CPT

## 2020-04-04 PROCEDURE — 87077 CULTURE AEROBIC IDENTIFY: CPT

## 2020-04-04 PROCEDURE — 86141 C-REACTIVE PROTEIN HS: CPT

## 2020-04-04 PROCEDURE — 36415 COLL VENOUS BLD VENIPUNCTURE: CPT

## 2020-04-04 PROCEDURE — 87804 INFLUENZA ASSAY W/OPTIC: CPT

## 2020-04-04 PROCEDURE — 80053 COMPREHEN METABOLIC PANEL: CPT

## 2020-04-04 PROCEDURE — 83735 ASSAY OF MAGNESIUM: CPT

## 2020-04-04 PROCEDURE — 81000 URINALYSIS NONAUTO W/SCOPE: CPT

## 2020-04-04 PROCEDURE — 85025 COMPLETE CBC W/AUTO DIFF WBC: CPT

## 2020-04-04 PROCEDURE — 87088 URINE BACTERIA CULTURE: CPT

## 2020-04-04 PROCEDURE — 85379 FIBRIN DEGRADATION QUANT: CPT

## 2020-04-04 NOTE — XMS REPORT
Patient Summarization Differential

                             Created on: 2020



Iman Sims

External Reference #: [id number]

: 1990

Sex: Female



Demographics





                          Address                   [address] [place] [address]



 

                          Home Phone                [phone]

 

                          Preferred Language        English

 

                          Marital Status            Unknown

 

                          Restorationist Affiliation     Unknown

 

                          Race                      White

 

                          Ethnic Group              Unknown





Author





                          Author                    [email or web address]

 

                          Organization              [email or web address]

 

                          Address                   [address]  [address]



 

                          Phone                     [phone]







Care Team Providers





                    Care Team Member Name Role                Phone

 

                    MARCELO GARCIA     Unavailable         [phone]

 

                    NO, LOCAL PHYSICIAN Unavailable         Unavailable

 

                    MARCELO GARCIA MD  Unavailable         Unavailable

 

                    FELIX SHEEHAN, ANGEL ARELLANO Unavailable         Unavailable

 

                    EVAN ZACARIAS Unavailable         Unavailable

 

                    FENKAY CHAVEZ DO Unavailable         Unavailable

 

                    PCP, NONE           Unavailable         Unavailable

 

                    DEREK DOMINGO      Unavailable         Unavailable

 

                    FELIX SHEEHAN, ANGEL ARELLANO Unavailable         Unavailable

 

                    EVAN ZACARIAS Unavailable         Unavailable

 

                    MARCELO GARCIA MD  Unavailable         Unavailable

 

                    MARCELO GARCIA MD  Unavailable         Unavailable

 

                    ABRIL ALVARADO      Unavailable         Unavailable

 

                    FENKAY CHAVEZ DO Unavailable         Unavailable

 

                    YASIR ESTRADA MD Unavailable         Unavailable



                                            



Allergies

                      



      



           Normalized  Allergy   Reported  Date of   Reaction(s)  Care Provider 

 Facility



                 Allergy Type    classification  allergen        Allergy Onset  

 

 

      



            DA (21     Unclassified  No Known Drug  2008 -  no information

  YASIR ESTRADA                                  Not Available



                 sources.)       Allergies       , MD            (44759)

 

      



            no information  Unclassified  NO KNOWN DRUG   NO KNOWN DRUG  SHAD RIGGINS  Not 

Available



              (1 source.)   ALLERGIES    ALLERGIES    SARTHAK     (95127)



                                                                                
       



Medications

                      



        



         Medication  Ingredient  Drug    Dose    Dates   Status  Sig     Sig    

 Care



                 Class(es)       (Normalized)    (Original)      Provid



                                         er

 

        



         no      FENTANYL  no      20  no      no      no      no



         information    information   17 -    informat  information  info

rmation  

name



              (1 source.)  MCG/2CC      20     ion          (no



                     VIAL                17                  phone)

 

        



         no      Lactated  no      20  no      no      no      no



         information  Ringer's  information   17 -    informat  information  inf

ormation  

name



              (1 source.)  Solution     20     ion          (no



                           17                        phone)

 

        



         no      oxyCODONE  Opioid   20  no      no      no      no



           information   Agonist   17 -      informat  information  information 

 name



                 (1 source.)     20        ion             (no



                           17                        phone)



                                                                                
                 



Problems

                      Active Problems            

            



      



           Problem   Normalized  Date of   Normalized  Normalized  Provider  Fac

ility



              Classification  Problem(s)   Problem      Problem      Problem Sta

tus  



                           Onset/Resoluti            Duration   



                                         on    

 

      



            Other      Anemia     Chronic    Active     MARCELO TEE ,  [facility]



                 complications   complicating     [facility]



                     of pregnancy        pregnancy,          Hospital -



                     (5 sources.)        third               [place]



                           trimester                 (04541)

 

      



            Anxiety    Anxiety    Chronic    Active     DEREK BERNOT  [facility]



                     disorders (5        disorderRadha



                     sources.)           unspecified         Hospital -



                                         [place]



                                         (10196)

 

      



            Other      Infection of   Episodic   Active     [facility]EL     [facility]



                 complications   other part of     [facility]



                 of pregnancy    genital tract     MD              Hospital -



                     (5 sources.)        in pregnancy,       [place]



                           first                     (45234)



                                         trimester     

 

      



            Mood disorders  Major      Chronic    Active     [facility]K [facility]NOT  [facility]



                     (2 sources.)        depressive          [facility]



                           disorder,                 Hospital -



                           single                    [place]



                           episode,                  (48271)



                                         unspecified     

 

      



           Nausea and  Nausea alone  2020 -  Episodic  Active    YASIR ROSE

[facility]  [facility]



                     vomiting (3         , [facility]



                           sources.)                 Hospital -



                                         [place]



                                         (09926)

 

      



            Substance-rela  Nicotine   Chronic    Active     [facility]     [facility]



                 abner disorders   dependence,     [facility]



                 (5 sources.)    cigarettes,     MD              Hospital -



                           uncomplicated             [place]



                                         (65381)

 

      



            Other female  Other      Episodic   Active     ANGEL     [facility]



                 genital         specified       [facility]



                 disorders (5    noninflammator     MD              Hospital -



                     sources.)           y disorders of      [place]



                           vagina                    (15937)

 

      



            Other      Other      Episodic   Active     ANGEL     [facility]



                 complications   specified       [facility]



                 of pregnancy    pregnancy       [facility] -



                     (5 sources.)        related             [place]



                           conditions,               (04852)



                                         first     



                                         trimester     



            

            Past or Other Problems            

            



      



           Problem   Normalized  Date of   Normalized  Normalized  Provider  Fac

ility



              Classification  Problem(s)   Problem      Problem      Problem Sta

tus  



                           Onset/Resoluti            Duration   



                                         on    

 

      



            Residual   10 weeks   no information  no information  [facility]     Not

 Available



                 codes;          gestation of     FELIX   (94189)



                     unclassified        pregnancy           MD 



                                         (4 sources.)      

 

      



            Residual   10 weeks   Episodic   Completed  ANGEL     [facility]



                 codes;          gestation of     [facility]



                 unclassified    pregnancy       MD              Hospital -



                           (1 source.)               [place]



                                         (63262)

 

      



            Residual   30 weeks   no information  no information  MARCELO GARCIA ,

  Not 

Available



                 codes;          gestation of     MD              (00353)



                           unclassified              pregnancy     



                                         (4 sources.)      

 

      



            Residual   30 weeks   Episodic   Completed  MARCELO TEE ,  [facility]



                 codes;          gestation of     [facility]



                     unclassified        pregnancy           Hospital -



                           (2 sources.)              [place]



                                         (51299)

 

      



            Residual   37 weeks   no information  no information  MARCELO TEE ,

  Not 

Available



                 codes;          gestation of     MD              ()



                           unclassified              pregnancy     



                                         (4 sources.)      

 

      



            Residual   37 weeks   Episodic   Completed  MARCELO TEE ,  [facility]



                 codes;          gestation of     MD              [facility]



                     unclassified        pregnancy           Hospital -



                           (1 source.)               [place]



                                         ()

 

      



            Residual   38 weeks   no information  no information  KAY FENECH

  Not 

Available



                 codes;          gestation of     , DO            (68778)



                           unclassified              pregnancy     



                                         (1 source.)      

 

      



            Residual   38 weeks   Episodic   Completed  KAY FENECH  [facility]



                 codes;          gestation of     , DO            [facility]



                     unclassified        pregnancy           Hospital -



                           (3 sources.)              [place]



                                         ()

 

      



            Residual   40 weeks   no information  no information  MARCELO TEE ,

  [facility]



                 codes;          gestation of     MD              [facility]



                     unclassified        pregnancy           Hospital -



                           (4 sources.)              [place]



                                         ()

 

      



            Residual   40 weeks   Episodic   Completed  MARCELO TEE ,  [facility]



                 codes;          gestation of     MD              [facility]



                     unclassified        pregnancy           Hospital -



                           (1 source.)               [place]



                                         ()

 

      



            Residual   9 weeks    no information  no information  EVAN COSBY

ot Available



                     codes;              gestation of        ()



                           unclassified              pregnancy     



                                         (4 sources.)      

 

      



            Residual   9 weeks    Episodic   Completed  PETER ZACARIAS  [facility]



                     codes;              gestation of        [facility]



                     unclassified        pregnancy           Hospital -



                           (1 source.)               [place]



                                         ()

 

      



            Residual   Acquired   Episodic   Completed  DEREK BERNOT  [facility]



                     codes;              absence of          [facility]



                     unclassified        other organs        Hospital -



                           (5 sources.)              [place]



                                         ()

 

      



            Residual   Acquired   Episodic   Completed  DEREK BERNOT  [facility]



                     codes;              absence of          [facility]



                     unclassified        other               Hospital -



                     (5 sources.)        specified           [place]



                           parts of                  ()



                                         digestive     



                                         tract     

 

      



            External cause  Contact with   Episodic   Completed  DEREK BERNOT  

[facility]



                     codes:              sharp glass,        [facility]



                     Cut/nguyen (2       initial             Hospital -



                     sources.)           encounter           [place]



                                         ()

 

      



            External cause  Contact with   no information  no information  TRAVI

S BERNOT  

[facility]



                     codes:              sharp glass,        [facility]



                     Cut/nguyen (3       initial             Hospital -



                     sources.)           encounter           [place]



                                         ()

 

      



            Other      Diseases of   Episodic   Completed  PETER ZACARIAS  [facility]



                     complications       the                 [facility]



                     of pregnancy        respiratory         Hospital -



                     (5 sources.)        system              [place]



                           complicating              (58843)



                                         pregnancy,     



                                         first     



                                         trimester     

 

      



            Conditions  Dizziness and   Episodic   Completed  PETER ZACARIAS  [facility]

ia



                     associated          giddiness           [facility]



                           with dizziness            Hospital -



                           or vertigo (5             [place]



                           sources.)                 (63625)

 

      



            Genitourinary  Dysuria    Episodic   Completed  [facility]     [facility]



                 symptoms and    Translations:     [facility]



                 ill-defined     [ PERSONAL      MD              Hospital -



                     conditions (10      HISTORY OF          [place]



                     sources.)           OTHER DISEASES      (62444)



                                         OF UR]     

 

      



            Immunizations  Encounter for   Episodic   Completed  DEREK BERNOT  

[facility]



                     and screening       immunization        [facility]



                           for infectious            Hospital -



                           disease (5                [place]



                           sources.)                 (25091)

 

      



            Contraceptive  Encounter for   Episodic   Completed  CHANDROUTIE  No

t Available



                 and             sterilization     S[facility]TH[facility]        (92197)



                           procreative               Translations:     



                           management (2             [ ENCOUNTER     



                           sources.)                 FOR     



                                         STERILIZATION]     

 

      



            Early or   False labor at   Episodic   Completed  MARCELO TEE ,  [facility]

H Via



                 threatened      or after 37     [facility]



                     labor (9            completed           Hospital -



                     sources.)           weeks of            [place]



                           gestation                 (23056)

 

      



            Residual   Family history   Episodic   Completed  [facility]ERNOT  [facility]



                     codes;              of ischemic         [facility]



                     unclassified        heart disease       Hospital -



                     (5 sources.)        and other           [place]



                           diseases of               (13072)



                                         the     



                                         circulatory     



                                         system     

 

      



            Residual   Family history   Episodic   Completed  [facility]ERNOT  [facility]



                     codes;              of malignant        [facility]



                     unclassified        neoplasm of         Hospital -



                     (5 sources.)        breast              [place]



                                         (42829)

 

      



            Residual   Family history   Episodic   Completed  [facility]ERNOT  [facility]



                     codes;              of malignant        [facility]



                     unclassified        neoplasm of         Hospital -



                     (5 sources.)        digestive           [place]



                           organs                    (94785)

 

      



            Residual   Family history   Episodic   Completed  [facility] BERNOT  [facility]



                     codes;              of malignant        [facility]



                     unclassified        neoplasm of         Hospital -



                     (5 sources.)        trachea,            [place]



                           bronchus and              (58087)



                                         lung     

 

      



            Deficiency and  Iron       Episodic   Completed  [facility]ELO TEE ,  [facility]



                 other anemia    deficiency      [facility]



                     (5 sources.)        anemia,             Hospital -



                           unspecified               [place]



                                         (33026)

 

      



            Umbilical cord  Labor and   Episodic   Completed  MARCELO TEE ,  [facility]

H [facility]



                 complication    delivery        [facility]



                     (7 sources.)        complicated by      Hospital -



                           cord around               [place]



                           neck, without             (02299)



                                         compression,     



                                         not applicable     



                                         or unspecified     



                                         Translations:     



                                         [ LABOR AND     



                                         DEL COMP BY     



                                         OT CORD     



                                         ENTANGLE,,     



                                         LABOR AND DEL     



                                         COMP BY OT     



                                         CORD     



                                         ENTANGLE,]     

 

      



            Open wounds of  Laceration   Episodic   Completed  DEREK BERNOT  [facility]

H [facility]



                     extremities         alycia [facility]



                     (10 sources.)       foreign body,       Hospital -



                           right knee,               [place]



                           initial                   (17609)



                                         encounter     

 

      



            Residual   Less than 8   no information  no information  [facility]     

Not Available



                 codes;          weeks           FELIX ,   (25753)



                     unclassified        gestation of        MD 



                           (3 sources.)              pregnancy     

 

      



            Residual   Less than 8   Episodic   Completed  [facility]     [facility]



                 codes;          weeks           [facility]



                 unclassified    gestation of     MD              Hospital -



                     (2 sources.)        pregnancy           [place]



                                         (87131)

 

      



            Mood disorders  Major      no information  no information  DEREK BE

RNOT  [facility]



                     (3 sources.)        depressive          [facility]



                           disorder,                 [facility] -



                           single                    [place]



                           episode,                  (94009)



                                         unspecified     

 

      



            Other upper  Nasal      Episodic   Completed  EVAN ZACARIAS  [facility]



                     respiratory         congestion          [facility]



                           disease (5                Hospital -



                           sources.)                 [place]



                                         (77231)

 

      



            Abdominal pain  Pelvic and   no information  no information  [facility] 

    Not 

Available



                 (12 sources.)   perineal pain     FELIX   (36381)



                                         MD 

 

      



            Abdominal pain  Pelvic and   Episodic   Completed  [facility]     [facility]

a



                 (4 sources.)    perineal pain     [facility] MD                        [facility]



                                         (29647)

 

      



            Screening and  Personal   Episodic   Completed  MARCELO TEE ,  [facility]



                 history of      history of      MD [facility]



                     mental health       nicotine            [facility] -



                     and substance       dependence          [place]



                           abuse codes               (30708)



                                         (10 sources.)      

 

      



            Other      Personal   Episodic   Completed  DEREK BERNOT  [facility]



                     infections;         history of          [facility] -



                     parasitic (5        infectious and      [place]



                     sources.)           parasitic           (44146)



                                         diseases     

 

      



            Prolonged  Post-term   Episodic   Completed  MARCELO TEE ,  [facility]



                 pregnancy (5    pregnancy       [facility]



                           sources.)                 [facility]



                                         (65099)

 

      



            Other      Pregnancy   Episodic   Completed  MARCELO TEE ,  [facility]



                 complications   related         [facility]



                     of pregnancy        conditions,         Hospital -



                     (6 sources.)        unspecified,        [place]



                           third                     (10458)



                                         trimester     

 

      



            Other      Single live   Episodic   Completed  MARCELO TEE ,  [facility]

ia



                 pregnancy and   birth           MD [facility]



                           delivery                  [facility] -



                           [facility]



                           normal (5                 (71358)



                                         sources.)      

 

      



            Other      Unspecified   Episodic   Completed  ANGEL     [facility]



                 complications   infection of     [facility]



                 of pregnancy    urinary tract     MD              Hospital -



                     (5 sources.)        in pregnancy,       [place]



                           first                     (56513)



                                         trimester     

 

      



            Unclassified  no information  2020 -  no information  no infor

mil ESTRADA                            [facility]



                     (3 sources.)        , MD                [facility]



                                         ([id number])

 

      



            Unclassified  no information  2020 -  no information  no infor

mil ESTRADA                            [facility]



                     (3 sources.)        , MD                [facility]



                                         ([id number])

 

      



            Unclassified  no information  2020 -  no information  no infor

mil ESTRADA                            [facility]



                     (3 sources.)        , MD                [facility]



                                         ([id number])

 

      



            Unclassified  no information  2020 -  no information  no infor

mil ESTRADA                            [facility]



                     (3 sources.)        , MD                [facility]



                                         ([id number])



                                                                                
                                                                                
                                                                                
                                                                                
                                                                                
                                                                                
                                                                                
                               



Procedures

                      



    



              Procedure    Normalized Procedure  Procedure Result  Performer    

Facility



                                         Date    

 

    



              04-   DELIVERY OF PRODUCTS  no information  no name (no randall

ne)  [facility] 

[facility]



                           OF CONCEPTION, EXTE       [facility]



                                         ([id number])

 

    



                 DELIVERY OF PRODUCTS  no information  no name (no phone)  Not A

vailable 

([id number])



                                         OF CONCEPTION, EXTE   



                                                                                
       



Immunizations

                      



    



              Normalized   Immunization Date  Notes        Care Provider  Facili

ty



                                         Immunization    

 

    



              tetanus toxoid,  2019   no information  no name      [facility]

 [facility]



                           reduced diphtheria        [facility]



                           toxoid, and               (22911)



                                         acellular pertussis    



                                         vaccine, adsorbed    



                                                                              



Results

                      



     



            Test Name  Value      Interpretation  Reference Range  Date Time  Fa

cility



                     (Normalized)        (Normalized)        (Medline  



                                         Reference)  

 

 



                                         No panel



                                         information



                                         on null

 

     



                 Control         no information  (no code)       [facility]



                                         (19712)

 

     



                 Exp date        3/2020          (no code)       [facility]



                                         (65688)

 

     



                 Lot #           [id number]         (no code)       [facility]



                                         (35307)

 

 



                                         No panel



                                         information



                                         on 2019

 

     



              Basophils (Bld)  0.064 10*3/uL  (N)          0 - 0.3 10*3/uL   [facility]



                           [#/Vol]                   [facility]



                                         (06718)

 

     



              Basophils/100  0.7 %        (N)          0.5 - 1 %    [facility]

alth



                           WBC (Bld)                 [facility]



                                         (30850)

 

     



              Calcidiol    38 ng/mL     (N)          20 - 50 ng/mL   [facility]

ealth



                           [Mass/Vol]                [facility]



                                         (85358)

 

     



              Cobalamin    665 pg/mL    (N)          200 - 900 pg/mL   [facility]



                           (Vitamin B12)             [facility]



                           [Mass/Vol]                [facility]



                                         (85086)

 

     



              Eosinophils  0.218 10*3/uL  (N)          0.05 - 0.5   [facility]

[facility]



                     (Bld) [#/Vol]       10*3/uL             [facility]



                                         (96977)

 

     



              Eosinophils/100  2.4 %        (N)          1 - 4 %      [facility]



                           WBC (Bld)                 [facility]



                                         (11325)

 

     



              Erythrocyte  13.6 %       (N)          11.6 - 14.6 %   [facility]

ealth



                           distribution              [facility] (RBC)               [facility]



                           [Ratio]                   (84387)

 

     



              Free T4      1.1 ng/dL    (N)          0.9 - 2.2 ng/dL   [facility]



                           [Mass/Vol]                [facility]



                                         (40608)

 

     



              Hematocrit (Bld)  41.9 %       (N)          36.1 - 50.3 %   [facility]



                           [Volume                   [facility]



                                         (19794)

 

     



              Hemoglobin (Bld)  13.4 g/dL    (N)          12.1 - 17.2 g/dL   [facility]



                           [Mass/Vol]                [facility]



                                         (29977)

 

     



              Lymphocytes  2.83 10*3/uL  (N)          0.9 - 2.9    [facility]

lth



                     (Bld) [#/Vol]       10*3/uL             [facility]



                                         (68631)

 

     



              Lymphocytes/100  31.1 %       (N)          20 - 40 %    [facility]



                           WBC (Bld)                 [facility]



                                         (56008)

 

     



              MCH (RBC)    28.0 pg      (N)          27 - 31 pg   [facility]

th



                           [Entitic mass]            [facility]



                                         (09842)

 

     



              MCHC (RBC)   32.0 g/dL    (N)          32 - 36 g/dL   [facility]

alth



                           [Mass/Vol]                [facility]



                                         (66381)

 

     



              MCV (RBC)    87.5 fL      (N)          80 - 100 fL   [facility]

[facility]



                           [Entitic vol]             [facility]



                                         (19199)

 

     



              Monocytes (Bld)  0.555 10*3/uL  (N)          0.3 - 0.9    [facility]



                     [#/Vol]             10*3/uL             [facility]



                                         (16577)

 

     



              Monocytes/100  6.1 %        (N)          2 - 8 %      [facility]

alth



                           WBC (Bld)                 [facility]



                                         (82780)

 

     



              Neutrophils  5.433 10*3/uL  (N)          1.7 - 7 10*3/uL   [facility]



                           (Bld) [#/Vol]             [facility]



                                         (27428)

 

     



              Neutrophils/100  59.7 %       (N)          40 - 60 %    [facility]



                           WBC (Bld)                 [facility]



                                         (00981)

 

     



              Platelet mean  11.4 fL      (N)          7.2 - 11.7 fL   [facility]



                           volume (Bld)              [facility]



                           [Entitic vol]             [facility]



                                         (29455)

 

     



              Platelets (Bld)  244 10*3/uL  (N)          150 - 450    [facility] 

Health



                     [#/Vol]             10*3/uL             [facility]



                                         (26290)

 

     



              RBC (Bld)    4.79 10*6/uL  (N)          4.2 - 6.1    [facility]

[facility]



                     [#/Vol]             10*6/uL             [facility]



                                         (48887)

 

     



              TSH Qn       1.33 m[IU]/L  (N)          0.4 - 4 m[IU]/L   [facility]



                                         (12444)

 

     



              WBC (Bld)    9.1 10*3/uL  (N)          3.5 - 10.5   [facility]

th



                     [#/Vol]             10*3/uL             [facility]



                                         (21881)

 

 



                                         No panel



                                         information



                                         on 2019

 

     



                 BLO             no information  (no code)       [facility]



                                         (53451)

 

     



                 KET             2019~clear  (no code)       [facility]

[facility]



                           ~yellow~none~neg          [facility]~negative~n          [facility]



                           egative                   (94236)

 

     



                 Lot #           [id number]          (no code)       [facility]

[facility]



                                         (38819)

 

     



              pH (Bld)     6.0 [pH]     (no code)    7.38 - 7.42 [pH]   [facility]



                                         (03397)

 

     



              Protein (U)  no information  (no code)    0 - 20 mg/dL   [facility]



                           [Mass/Vol]                [facility]



                                         (36768)

 

     



                 SG              1.020           (no code)       [facility]

[facility]



                                         (70194)

 

     



                 URO             0.2             (no code)       [facility]

[facility]



                                         (38897)

 

 



                                         No panel



                                         information



                                         on 2019

 

     



              Albumin      4.3 g/dL     (N)          3.4 - 5.4 g/dL   [facility]



                           [Mass/Vol]                [facility]



                                         (47159)

 

     



              Albumin/Globulin  1.7 {ratio}  (N)          1 - 2.5 {ratio}   [facility]



                           [Mass ratio]              [facility]



                                         (03392)

 

     



              ALP [Catalytic  71 U/L       (N)          44 - 147 U/L   [facility]



                           activity/Vol]             [facility]



                                         (43225)

 

     



              ALT [Catalytic  13 U/L       (N)          4 - 40 U/L   [facility] 

[facility]



                           activity/Vol]             [facility]



                                         (41687)

 

     



              AST [Catalytic  12 U/L       (N)          10 - 34 U/L   [facility]



                           activity/Vol]             [facility]



                                         (58377)

 

     



              Bilirubin    0.5 mg/dL    (N)          0.1 - 1.2 mg/dL   [facility]



                           [Mass/Vol]                [facility]



                                         (35839)

 

     



              Calcium      9.0 mg/dL    (N)          8.5 - 10.2 mg/dL   [facility]



                           [Mass/Vol]                [facility]



                                         (16589)

 

     



              Chloride     106 mmol/L   (N)          95 - 106 mmol/L   [facility]



                           [Moles/Vol]               [facility]



                                         (23339)

 

     



              Cholesterol  133 mg/dL    (N)          180 - 200 mg/dL   [facility]



                           [Mass/Vol]                [facility]



                                         (96687)

 

     



                 Cholesterol in  50 mg/dL        (L)             [facility]

h



                           HDL [Mass/Vol]            [facility]



                                         (96248)

 

     



              Cholesterol in  73 mg/dL     (N)          0 - 100 mg/dL   [facility]



                           LDL [Mass/Vol]            [facility]



                                         (68264)

 

     



                 Cholesterol non  83 mg/dL        (N)             [facility]

[facility]



                           HDL [Mass/Vol]            [facility]



                                         (20400)

 

     



                 Cholesterol.tota  2.7 {ratio}     (N)             [facility]

lt



                           l/Cholesterol in          [facility]



                           HDL [Mass ratio]          [facility]



                                         (14965)

 

     



              CO2 [Moles/Vol]  26 mmol/L    (N)          23 - 29 mmol/L   [facility]



                                         (85449)

 

     



                 Creatinine      0.64 mg/dL      (N)             [facility]

h



                           [Mass/Vol]                [facility]



                                         (45039)

 

     



              GFR/1.73 sq M  140          (N)          90 - 120     [facility]

[facility]



                 predicted among  mL/min/{1.73_m2}   mL/min/{1.73_m2}   [facility]



                           blacks MDRD               [facility]



                           (S/P/Bld) [Vol            (10094)



                                         rate/Area]     

 

     



              GFR/1.73 sq  121          (N)          90 - 120     [facility]

th



                 M.predicted MDRD  mL/min/{1.73_m2}   mL/min/{1.73_m2}   [facility]



                           (S/P/Bld) [Vol            [facility]



                           rate/Area]                (95783)

 

     



              Globulin (S)  2.5 g/dL     (N)          2 - 3.5 g/dL   [facility]

ealth



                           [Mass/Vol]                [facility]



                                         (40255)

 

     



              Glucose      83 mg/dL     (N)          60 - 125 mg/dL   [facility]



                           [Mass/Vol]                [facility]



                                         (50559)

 

     



              Potassium    4.1 mmol/L   (N)          3.7 - 5.2 mmol/L   [facility]



                           [Moles/Vol]               [facility]



                                         (83509)

 

     



              Protein      6.8 g/dL     (N)          6.4 - 8.3 g/dL   [facility]



                           [Mass/Vol]                [facility]



                                         (25691)

 

     



              Sodium       140 mmol/L   (N)          135 - 145 mmol/L   [facility]



                           [Moles/Vol]               [facility]



                                         (84330)

 

     



              Triglyceride  34 mg/dL     (N)          0 - 150 mg/dL   [facility]



                           [Mass/Vol]                [facility]



                                         (71187)

 

     



              Urea nitrogen  10 mg/dL     (N)          7 - 20 mg/dL   [facility]



                           [Mass/Vol]                [facility]



                                         (21268)

 

     



                 Urea            NOT APPLICABLE  (no code)       [facility]



                           nitrogen/Creatin          [facility] [Mass ratio]          [facility]



                                         (92081)

 

 



                                         No panel



                                         information



                                         on 2017

 

     



              Beta HCG     no information  (no code)    2017   Not Availab

le



                     (pregnancy test)     10:          (05861)



                                         Ql     

 

     



              CULTURE SOURCE  cath         (no code)    2017   Not Availab

le



                           11:                (53671)

 

     



              FINAL CULTURE  No Growth 48  (no code)    2017   Not Availab

le



                 RESULTS         hours           11:      (19344)

 

     



              MEDIA PLATED  Setup at 12:02  (no code)    2017   Not Availa

ble



                     on 2017        11:          (94445)

 

     



              PRELIM CULTURE  No Growth 24  (no code)    2017   Not Availa

ble



                 RESULTS         hours           11:      (01982)

 

 



                                         No panel



                                         information



                                         on 2017

 

     



            Basophils (Bld)  0.0 10*3/uL  (no code)  0 - 0.3 10*3/uL  2017

  Not 

Available



                     [#/Vol]             15:          (25198)

 

     



            Basophils/100  0.20 %     (no code)  0.5 - 1 %  2017  Not Avai

lable



                     WBC (Bld)           15:          (70207)

 

     



            Eosinophils  0.5 10*3/uL  (no code)  0.05 - 0.5  2017  Not [place]

ilable



                 (Bld) [#/Vol]    10*3/uL         15:      (04662)

 

     



            Eosinophils/100  5.5 %      (no code)  1 - 4 %    2017  Not Av

ailable



                     WBC (Bld)           15:          (56338)

 

     



            Erythrocyte  15.7 %     (H)        11.6 - 14.6 %  2017  Not Av

ailable



                     distribution        15:          (79246)



                                         width (RBC)     



                                         [Ratio]     

 

     



            Hematocrit (Bld)  38.4 %     (no code)  36.1 - 50.3 %  2017  N

ot Available



                     [Volume             15:          (01362)



                                         fraction]     

 

     



            Hemoglobin (Bld)  11.9 g/dL  (L)        12.1 - 17.2 g/dL  2017

  Not Available



                     [Mass/Vol]          15:          (21806)

 

     



            Lymphocytes  2.27 10*3/uL  (no code)  0.9 - 2.9  2017  Not [place]

ilable



                 (Bld) [#/Vol]    10*3/uL         15:      (59363)

 

     



            Lymphocytes/100  26.1 %     (no code)   - 40 %  2017  Not Av

ailable



                     WBC (Bld)           15:          (35466)

 

     



            MCH (RBC)  26.2 pg    (L)        27 - 31 pg  2017  Not Availab

le



                     [Entitic mass]      15:          (01195)

 

     



            MCHC (RBC)  31.0 g/dL  (L)        32 - 36 g/dL  2017  Not Avai

lable



                     [Mass/Vol]          15:040400          (21860)

 

     



            MCV (RBC)  84.6 fL    (no code)  80 - 100 fL  2017  Not Availa

ble



                     [Entitic vol]       15:          (01997)

 

     



            Monocytes (Bld)  0.6 10*3/uL  (no code)  0.3 - 0.9  2017  Not 

Available



                 [#/Vol]         10*3/uL         15:      (59307)

 

     



            Monocytes/100  7.0 %      (no code)  2 - 8 %    2017  Not Avai

lable



                     WBC (Bld)           15:          (15729)

 

     



            Neutrophils  5.31 10*3/uL  (no code)  1.7 - 7 10*3/uL  2017  N

ot 

Available



                     (Bld) [#/Vol]       15:          (49596)

 

     



            Neutrophils/100  61.2 %     (no code)  40 - 60 %  2017  Not Av

ailable



                     WBC (Bld)           15:          (40272)

 

     



            Platelet mean  12.1 fL    (H)        7.2 - 11.7 fL  2017  Not 

Available



                     volume (Bld)        15:          (17464)



                                         [Entitic vol]     

 

     



            Platelets (Bld)  217 10*3/uL  (no code)  150 - 450  2017  Not 

Available



                 [#/Vol]         10*3/uL         15:      (01209)

 

     



            RBC (Bld)  4.54 10*6/uL  (no code)  4.2 - 6.1  2017  Not Avail

able



                 [#/Vol]         10*6/uL         15:      (56503)

 

     



            WBC (Bld)  8.69 10*3/uL  (no code)  3.5 - 10.5  2017  Not Avai

lable



                 [#/Vol]         10*3/uL         15:      (29549)



                                                                                
                                                                                
                                                                                
                                                                                
                                                                                
                                                                                
                                                                                
                                                                                
                                                                                
                                                                                
                                                                                
                            



Vital Signs

                      The data below is from unstructured sources            



                    Vital                   Response                   Date/Time

                

 

                          Temperature (Fahrenheit)                   98.9 degree

s F (97.6 - 99.5)         

                                        08/15/2016 8:25pm                

 

                          Temperature (Calculated Celsius)                   37.

26645 degrees C (36.4 - 

37.5)                                   08/15/2016 8:25pm                

 

                    Temperature Source                   Temporal               

    08/15/2016 

8:25pm                

 

                    Pulse Rate (adult)                   94 bpm (60 - 90)       

            

08/15/2016 8:25pm                

 

                    Respiratory Rate                   18 bpm (12 - 24)         

          08/15/2016

 8:25pm                

 

                    O2 Sat by Pulse Oximetry                   98 % (88 - 100)  

                 

08/15/2016 8:25pm                

 

                    Blood Pressure                   126/77 mm Hg               

    08/15/2016 

8:25pm                

 

                     Blood Pressure Mean                   93 mm Hg             

      08/15/2016 

8:25pm                

 

                    Pain                                                       

 

                     Numeric Pain Scale                   8                   08

/15/2016 9:09pm     

           

 

                    Height (Feet)                   5 feet                   08/

15/2016 8:25pm      

          

 

                    Height (Inches)                   7 inches                  

 08/15/2016 8:25pm  

              

 

                          Height (Calculated Centimeters)                   170.

101658 cm                 

                                        08/15/2016 8:25pm                

 

                    Weight (Pounds)                   161 pounds                

   08/15/2016 8:25pm

                

 

                    Weight (Calculated Grams)                   43521.780 gm    

               

08/15/2016 8:25pm                

 

                          Weight (Calculated Kilograms)                   73.028

372 kilograms             

                                        08/15/2016 8:25pm                

 

                    Capillary Refill                                            

           

 

                     Capillary Refill                   Less Than 3 Seconds     

              

08/15/2016 8:25pm                

 

                    Height                   5 ft 7 in                          

         

 

                    Weight                   161 lb                             

      

 

                    Body Mass Index                   25.2 kg/m^2               

                    



            



                    Vital                   Response                   Date/Time

                

 

                          Temperature (Fahrenheit)                   98.9 degree

s F (97.6 - 99.5)         

                                        08/15/2016 9:22pm                

 

                          Temperature (Calculated Celsius)                   37.

91878 degrees C (36.4 - 

37.5)                                   08/15/2016 9:22pm                

 

                    Temperature Source                   Temporal               

    08/15/2016 

9:22pm                

 

                    Pulse Rate (adult)                   101 bpm (60 - 90)      

             

2016 7:38pm                

 

                    Respiratory Rate                   18 bpm (12 - 24)         

          2016

 7:38pm                

 

                    O2 Sat by Pulse Oximetry                   99 % (88 - 100)  

                 

2016 7:38pm                

 

                    Blood Pressure                   126/80 mm Hg               

    2016 

7:38pm                

 

                     Blood Pressure Mean                   95 mm Hg             

      2016 

7:38pm                

 

                    Pain                                                       

 

                     Numeric Pain Scale                   4                    7:38pm     

           

 

                    Height (Feet)                   5 feet                    7:38pm      

          

 

                    Height (Inches)                   6 inches                  

 2016 7:38pm  

              

 

                          Height (Calculated Centimeters)                   167.

022668 cm                 

                                        2016 7:38pm                

 

                    Weight (Pounds)                   164 pounds                

   2016 7:38pm

                

 

                    Weight (Calculated Grams)                   95934.780 gm    

               

08/15/2016 8:25pm                

 

                          Weight (Calculated Kilograms)                   74.389

149 kilograms             

                                        2016 7:38pm                

 

                    Capillary Refill                                            

           

 

                     Capillary Refill                   Less Than 3 Seconds     

              

2016 7:38pm                

 

                    Height                   5 ft 6 in                          

         

 

                    Weight                   164 lb                             

      

 

                    Body Mass Index                   26.5 kg/m^2               

                    



            



                    Vital                   Response                   Date/Time

                

 

                          Temperature (Fahrenheit)                   98.9 degree

s F (97.6 - 99.5)         

                                        2016 8:05pm                

 

                          Temperature (Calculated Celsius)                   37.

11325 degrees C (36.4 - 

37.5)                                   2016 8:05pm                

 

                    Temperature Source                   Temporal               

    2016 

8:05pm                

 

                    Pulse Rate (adult)                   75 bpm (60 - 90)       

            

2016 8:05pm                

 

                    Respiratory Rate                   16 bpm (12 - 24)         

          2016

 8:05pm                

 

                    O2 Sat by Pulse Oximetry                   98 % (88 - 100)  

                 

2016 8:05pm                

 

                    Blood Pressure                   128/67 mm Hg               

    2016 

8:05pm                

 

                     Blood Pressure Mean                   87 mm Hg             

      2016 

8:05pm                

 

                    Pain                                                       

 

                     Numeric Pain Scale                   6                    8:05pm     

           

 

                    Height (Feet)                   5 feet                    8:05pm      

          

 

                    Height (Inches)                   6 inches                  

 2016 8:05pm  

              

 

                          Height (Calculated Centimeters)                   167.

284030 cm                 

                                        2016 8:05pm                

 

                    Weight (Pounds)                   168 pounds                

   2016 8:05pm

                

 

                    Weight (Calculated Grams)                   49155.780 gm    

               

08/15/2016 8:25pm                

 

                          Weight (Calculated Kilograms)                   76.203

519 kilograms             

                                        2016 8:05pm                

 

                    Capillary Refill                                            

           

 

                     Capillary Refill                   Less Than 3 Seconds     

              

2016 8:05pm                

 

                    Height                   5 ft 6 in                          

         

 

                    Weight                   168 lb                             

      

 

                    Body Mass Index                   27.1 kg/m^2               

                    



            



                    Vital                   Response                   Date/Time

                

 

                          Temperature (Fahrenheit)                   97.0 degree

s F (97.6 - 99.5)         

                                        2016 2:30pm                

 

                          Temperature (Calculated Celsius)                   36.

39795 degrees C (36.4 - 

37.5)                                   2016 2:30pm                

 

                    Pulse Rate (adult)                   70 bpm (60 - 90)       

            

2016 2:30pm                

 

                    Respiratory Rate                   16 bpm (12 - 24)         

          2016

 2:30pm                

 

                    O2 Sat by Pulse Oximetry                   98 % (88 - 100)  

                 

2016 2:30pm                

 

                    Blood Pressure                   118/70 mm Hg               

    2016 

2:30pm                

 

                     Blood Pressure Mean                   86 mm Hg             

      2016 

2:30pm                

 

                    Pain                                                       

 

                     Pain Intensity                   0                   2016 2:30pm         

       

 

                    Height (Feet)                   5 feet                    2:30pm      

          

 

                    Height (Inches)                   6 inches                  

 2016 2:30pm  

              

 

                          Height (Calculated Centimeters)                   167.

664097 cm                 

                                        2016 2:30pm                

 

                    Weight (Pounds)                   160 pounds                

   2016 2:30pm

                

 

                          Weight (Calculated Kilograms)                   72.574

780 kilograms             

                                        2016 2:30pm                

 

                    Height                   5 ft 6 in                          

         

 

                    Weight                   160 lb                             

      

 

                    Body Mass Index                   25.8 kg/m^2               

                    



            



                    Vital                   Response                   Date/Time

                

 

                          Temperature (Fahrenheit)                   97.0 degree

s F (97.6 - 99.5)         

                                        2016 2:30pm                

 

                          Temperature (Calculated Celsius)                   36.

40527 degrees C (36.4 - 

37.5)                                   2016 2:30pm                

 

                    Pulse Rate (adult)                   70 bpm (60 - 90)       

            

2016 2:30pm                

 

                    Respiratory Rate                   16 bpm (12 - 24)         

          2016

 2:30pm                

 

                    O2 Sat by Pulse Oximetry                   98 % (88 - 100)  

                 

2016 2:30pm                

 

                    Blood Pressure                   118/70 mm Hg               

    2016 

2:30pm                

 

                     Blood Pressure Mean                   86 mm Hg             

      2016 

2:30pm                

 

                    Pain                                                       

 

                     Pain Intensity                   0                   2016 2:30pm         

       

 

                    Height (Feet)                   5 feet                    2:30pm      

          

 

                    Height (Inches)                   6 inches                  

 2016 2:30pm  

              

 

                          Height (Calculated Centimeters)                   167.

407626 cm                 

                                        2016 2:30pm                

 

                    Weight (Pounds)                   160 pounds                

   2016 2:30pm

                

 

                          Weight (Calculated Kilograms)                   72.574

780 kilograms             

                                        2016 2:30pm                

 

                    Height                   5 ft 6 in                          

         

 

                    Weight                   160 lb                             

      

 

                    Body Mass Index                   25.8 kg/m^2               

                    



                                                                    



Interventions

          No Information                                                        
  



Plan of Treatment

                      The data below is from unstructured sources            



                          Discharge Date                   08/15/16 9:22pm      

          

 

                          Disposition                   01 HOME, SELF-CARE      

          

 

                          Condition at Discharge                   Improved     

           

 

                          Instructions/Education Provided                   Urin

anthony Tract Infection in 

Women (ED)                

 

                          Prescriptions                   See Medication Section

                

 

                          Referrals                   MARCELO GARCIA MD

 Care Physician          

          

                    

                    

NO,LOCAL PHYSICIAN - Primary Care Physician                                  

 

                          Additional Instructions/Education                   Fo

llow-up with Dr. Garcia 

in 48 hours to review urine culture results. Call                     

your office in the morning to schedule the appointment. Also obtain results of  
                   

the pelvic exam to ensure no signs of infection were found and ask if a repeat  
                   

pelvic exam is requested. Complete the entire course of antibiotics as          
           

prescribed. Drink plenty of clear liquids. Take Tylenol up to 1000 mg every 6   
                  

hours as needed for pain. Return to care if symptoms worsen.                    
 

                    

                    

                    

All discharge instructions reviewed with patient and/or family. Voiced          
           

understanding.                                  



            



                          Discharge Date                   16 8:16pm      

          

 

                          Disposition                   01 HOME, SELF-CARE      

          

 

                          Condition at Discharge                   Improved     

           

 

                          Instructions/Education Provided                   Uppe

r Respiratory Infection 

(ED)                

 

                          Prescriptions                   See Medication Section

                

 

                          Referrals                   MARCELO GARCIA MD

 Care Physician          

          

                    

                    

NO,LOCAL PHYSICIAN - Primary Care Physician                                  

 

                          Additional Instructions/Education                   1.

 Medication as directed   

                  

                                        2. Follow-up with your obstetrician next

 week                     

                                        3. All discharge instructions reviewed w

ith patient and/or family. Voiced       

              

understanding.                                  



            



                          Discharge Date                   16 10:35pm     

           

 

                          Disposition                   01 HOME, SELF-CARE      

          

 

                          Condition at Discharge                   Improved     

           

 

                          Instructions/Education Provided                   Acut

e Abdominal Pain (ED)     

           

 

                          Prescriptions                   See Medication Section

                

 

                          Referrals                   MARCELO GARCIA MD Care Physician          

          

                    

                    

NO,LOCAL PHYSICIAN - Primary Care Physician                                  

 

                          Additional Instructions/Education                   Fo

llow-up with Dr. Garcia 

on Friday or early next week. Return to care if                     

symptoms worsen. You may take Tylenol for pain. Drink plenty of clear liquids.  
                   

 You need to review your vaginal and urine cultures with Dr. Garcia. Nothing   
                  

vaginal including intercourse until cleared by Dr. Garcia.                     

                    

                    

                    

All discharge instructions reviewed with patient and/or family. Voiced          
           

understanding.                                  



            



                          Discharge Date                   16 3:25pm      

          

 

                          Disposition                   01 HOME, SELF-CARE      

          

 

                          Condition at Discharge                   Improved     

           

 

                          Instructions/Education Provided                   NO I

NSTRUCTIONS GIVEN         

       

 

                          Prescriptions                   See Medication Section

                

 

                          Referrals                   NO,[facility] PHYSICIAN [facility] Physician         

           

                    

                    

THUY VELARDE BETHANY N MD - GRIFFIN,NAOMY ORDONEZ MD -                                   

 

                          Additional Instructions/Education                   1.

 Follow-up with [facility] to have a pelvic exam done                     

                                        2. Return to ER for any concerns        

             

All discharge instructions reviewed with patient and/or family. Voiced          
           

understanding.                                  



            



                          Discharge Date                   16 3:25pm      

          

 

                          Disposition                   01 HOME, SELF-CARE      

          

 

                          Condition at Discharge                   Improved     

           

 

                          Instructions/Education Provided                   NO I

NSTRUCTIONS GIVEN         

       

 

                          Prescriptions                   See Medication Section

                

 

                          Referrals                   NO,[facility] PHYSICIAN [facility] Physician         

           

                    

                    

THUY VELARDE BETHANY N MD - GRIFFIN,NAOMY ORDONEZ MD -                                   

 

                          Additional Instructions/Education                   1.

 Follow-up with [facility] to have a pelvic exam done                     

                                        2. Return to ER for any concerns        

             

All discharge instructions reviewed with patient and/or family. Voiced          
           

understanding.                                  



                                                                    



Goals

          No Information                                                        
  



Social History

          No Information                                                        
  



Functional Status

                      The data below is from unstructured sourcesNo functional 
status results.No functional status results.No functional status results.No 
functional status results.No functional status results.No functional status 
results.No functional status results.                                           
                         



Mental Status

          No Information                                                        
  



Encounters

                      



    



              Encounter    Normalized Encounter  Encounter Diagnosis  Care Provi

yuri  

Organization



                           Date                      Type   

 

    



              2019   Emergency department  no information  no name (no [facility]

ne)  no 

organization name



                     -                   patient visit       (no phone)



                                         2019   Emergency department  no information  no name (no [facility]

ne)  no 

organization name



                     -                   patient visit       (no phone)



                                         2019   Emergency department  no information  no name (no [facility]

ne)  no 

organization name



                     -                   patient visit       (no phone)



                                         2016   Emergency department  no information  no name (no [facility]

ne)  no 

organization name



                     -                   patient visit       (no phone)



                                         08-    

 

    



              08-   Emergency department  no information  no name (no [facility]

ne)  no 

organization name



                     -                   patient visit       (no phone)



                                         08-    

 

    



              04-   Evaluation and  no information  no name (no phone)  n

o organization

 name



                     -                   management of       (no phone)



                           2017                inpatient   

 

    



              2017   Evaluation and  no information  no name (no phone)  n

o organization

 name



                     -                   management of       (no phone)



                           2017                inpatient   

 

    



              2008   Patient encounter  no information  no name (no phone)

  no 

organization name



                                         (no phone)

 

    



              2019   Patient encounter  no information  no name (no phone)

  no 

organization name



                           procedure                 (no phone)

 

    



              2019   Patient encounter  no information  no name (no phone)

  no 

organization name



                           procedure                 (no phone)

 

    



              2019   Patient encounter  no information  no name (no phone)

  no 

organization name



                           procedure                 (no phone)

 

    



              2019   Patient encounter  no information  no name (no phone)

  no 

organization name



                           procedure                 (no phone)

 

    



              2019   Patient encounter  no information  no name (no phone)

  no 

organization name



                           procedure                 (no phone)

 

    



              08-   Patient encounter  no information  no name (no phone)

  no 

organization name



                           procedure                 (no phone)

 

    



              08-   Patient encounter  no information  no name (no phone)

  no 

organization name



                           procedure                 (no phone)

 

    



              2019   Patient encounter  no information  no name (no phone)

  no 

organization name



                           procedure                 (no phone)

 

    



              2019   Patient encounter  no information  no name (no phone)

  no 

organization name



                           procedure                 (no phone)

 

    



              2019   Patient encounter  no information  no name (no phone)

  no 

organization name



                           procedure                 (no phone)

 

    



              2019   Patient encounter  no information  no name (no phone)

  no 

organization name



                           procedure                 (no phone)

 

    



              2019   Patient encounter  no information  no name (no phone)

  no 

organization name



                           procedure                 (no phone)

 

    



              2019   Patient encounter  no information  no name (no phone)

  no 

organization name



                           procedure                 (no phone)

 

    



              2019   Patient encounter  no information  no name (no phone)

  no 

organization name



                           procedure                 (no phone)

 

    



              2019   Patient encounter  no information  no name (no phone)

  no 

organization name



                           procedure                 (no phone)

 

    



              2019   Patient encounter  no information  no name (no phone)

  no 

organization name



                           procedure                 (no phone)

 

    



              2019   Patient encounter  no information  no name (no phone)

  no 

organization name



                           procedure                 (no phone)

 

    



              2018   Patient encounter  no information  no name (no phone)

  no 

organization name



                           procedure                 (no phone)

 

    



              2017   Patient encounter  no information  no name (no phone)

  no 

organization name



                     -                   procedure           (no phone)



                                         2017   Patient encounter  no information  no name (no phone)

  no 

organization name



                     -                   procedure           (no phone)



                                         2017   Patient encounter  no information  no name (no phone)

  no 

organization name



                     -                   procedure           (no phone)



                                         2017   Patient encounter  no information  no name (no phone)

  no 

organization name



                     -                   procedure           (no phone)



                                         2017   Patient encounter  no information  no name (no phone)

  no 

organization name



                     -                   procedure           (no phone)



                                         2017   Patient encounter  no information  no name (no phone)

  no 

organization name



                     -                   procedure           (no phone)



                                         2017   Patient encounter  no information  no name (no phone)

  no 

organization name



                     -                   procedure           (no phone)



                                         2017   Patient encounter  no information  no name (no phone)

  no 

organization name



                     -                   procedure           (no phone)



                                         2017   Patient encounter  no information  no name (no phone)

  no 

organization name



                     -                   procedure           (no phone)



                                         2017   Patient encounter  no information  YASIR ESTRADA MD

 (no  [facility] 

[facility]



                     procedure           phone)              [facility]



                                         (no phone)

 

    



                 Patient encounter  no information  no name (no phone)  no organ

ization name



                           procedure                 (no phone)



                                                                                
                                                                                
                                                                                
                                                                                
                                                                                
                                      



Medical Equipment

          No Information                                                        
  



Payers

                      



 



                           Normalized Payer          Value

 

 



                           Medicaid                  no information



                                                                              



Advance Directives

                      



                    Directive                   Response                   Recor

ded Date/Time       

         

 

                    Advance Directives                   No                   08

/15/16 8:25pm       

         

 

                    Health Care Power of                    No          

         08/15/16 

8:25pm                

 

                    Resuscitation Status                   Full Code            

       08/15/16 

8:25pm                



            



                    Directive                   Response                   Recor

ded Date/Time       

         

 

                    Advance Directives                   No                   08

/15/16 8:25pm       

         

 

                    Health Care Power of                    No          

         08/15/16 

8:25pm                



            



                    Directive                   Response                   Recor

ded Date/Time       

         

 

                    Advance Directives                   No                    8:05pm       

         

 

                    Health Care Power of                    No          

         16 

8:05pm                

 

                    Resuscitation Status                   Full Code            

       16 

8:05pm                



            



                    Directive                   Response                   Recor

ded Date/Time       

         

 

                    Advance Directives                   No                   06

/20/16 2:33pm       

         

 

                    Health Care Power of                    No          

         16 

2:33pm                

 

                    Resuscitation Status                   Full Code            

       16 

2:33pm                



                                                                    



Discharge Instructions

          No hospital discharge instructions.No hospital discharge 
instructions.No hospital discharge instructions.No hospital discharge 
instructions.                                                



Additional Source Comments

          This clinical document has been generated using [employer] 
software that has been certified by the Office of the National Coordinator for 
Health Information Technology (ONC 15.99.04.3023.Diam.31.00.0.460109) and the 
National Committee for  (NCQA, as an eMeasure certified 
technology).            

            

FOR RECORDS PERTAINING TO PATIENTS WHO ARE OR HAVE BEEN ENROLLED IN A CHEMICAL D
EPENDENCY/SUBSTANCE ABUSE PROGRAM, SOME INFORMATION MAY BE OMITTED. This clinica
l summary was aggregated from multiple sources.  Caution should be exercised in 
using it in the provision of clinical care. This summary normalizes information 
from multiple sources, and as a consequence, information in this document may ma
terially change the coding, format and clinical context of patient data. In randal
tion, data may be omitted in some cases. CLINICAL DECISIONS SHOULD BE BASED ON T
HE PRIMARY CLINICAL RECORDS. [employer] provides no warranty or guara
ntee of the accuracy or completeness of information in this document.The followi
ng information is based on time limited clinical information

## 2020-04-04 NOTE — XMS REPORT
Continuity of Care Document

                             Created on: 2020



Iman Sims

External Reference #: [id number]

: 1990

Sex: Female



Demographics





                          Address                   [address] [place] [address]  [address]

 

                          Home Phone                [phone] x

 

                          Preferred Language        Unknown

 

                          Marital Status            Unknown

 

                          Yarsani Affiliation     Unknown

 

                          Race                      Unknown

 

                          Ethnic Group              Unknown





Author





                          Organization              Unknown

 

                          Address                   Unknown

 

                          Phone                     Unavailable



              



Allergies

      



             Active           Description           Code           Type         

  Severity   

                Reaction           Onset           Reported/Identified          

 

Relationship to Patient                 Clinical Status        

 

                Yes             No Known Drug Allergies           [id number]    

       Drug 

Allergy           Unknown           N/A                             2008  

      

                                                             



                  



Medications

      



There is no data.                  



Problems

      



             Date Dx Coded           Attending           Type           Code    

       

Diagnosis                               Diagnosed By        

 

             2016           EVAN ZACARIAS           Ot           R10

.2           

PELVIC AND PERINEAL PAIN                         

 

                08/15/2016           ANGEL WASHINGTON MD T           Ot      

        F17.210    

                          NICOTINE DEPENDENCE, CIGARETTES, UNCOMPL              

      

 

                08/15/2016           ANGEL WASHINGTON MD T           Ot      

        O23.41     

                          UNSP INFCT OF URINARY TRACT IN PREGNANCY              

      

 

                08/15/2016           ANGEL WASHINGTON MD T           Ot      

        R30.0      

                          DYSURIA                            

 

                08/15/2016           ANGEL WASHINGTON MD T           Ot      

        Z3A.01     

                          LESS THAN 8 WEEKS GESTATION OF PREGNANCY              

      

 

                2016           ANGEL WASHINGTON MD T           Ot      

        F17.210    

                          NICOTINE DEPENDENCE, CIGARETTES, UNCOMPL              

      

 

                2016           ANGEL WASHINGTON MD T           Ot      

        O23.41     

                          UNSP INFCT OF URINARY TRACT IN PREGNANCY              

      

 

                2016           ANGEL WASHINGTON MD T           Ot      

        R30.0      

                          DYSURIA                            

 

                2016           ANGEL WASHINGTON MD T           Ot      

        Z3A.01     

                          LESS THAN 8 WEEKS GESTATION OF PREGNANCY              

      

 

             2016           EVAN ZACARIAS           Ot           J06

.9           

ACUTE UPPER RESPIRATORY INFECTION, UNSPE                    

 

                2016           EVAN ZACARIAS APRN           Ot           

   O99.511         

                          DISEASES OF THE RESP SYS COMP PREGNANCY,              

      

 

                2016           EVAN ZACARIAS           Ot           

   R09.81          

                          NASAL CONGESTION                    

 

             2016           EVAN ZACARIAS           Ot           R42

           

DIZZINESS AND GIDDINESS                          

 

                2016           EVAN ZACARIAS           Ot           

   Z3A.09          

                          9 WEEKS GESTATION OF PREGNANCY                    

 

             2016           EVAN ZACARIAS           Ot           J06

.9           

ACUTE UPPER RESPIRATORY INFECTION, UNSPE                    

 

                2016           EVAN ZACARIAS           Ot           

   O99.511         

                          DISEASES OF THE RESP SYS COMP PREGNANCY,              

      

 

                2016           EVAN ZACARIAS           Ot           

   R09.81          

                          NASAL CONGESTION                    

 

             2016           EVAN ZACARIAS APRN           Ot           R42

           

DIZZINESS AND GIDDINESS                          

 

                2016           EVAN ZACARIAS APRN           Ot           

   Z3A.09          

                          9 WEEKS GESTATION OF PREGNANCY                    

 

                2016           ANGEL WASHINGTON MD T           Ot      

        N89.8      

                          OTHER SPECIFIED NONINFLAMMATORY DISORDER              

      

 

                2016           ANGEL WASHINGTON MD T           Ot      

        O23.591    

                          INFECTION OTH PRT GENITL TRCT IN PREGNAN              

      

 

                2016           ANGEL WASHINGTON MD           Ot      

        O26.891    

                          OTH PREGNANCY RELATED CONDITIONS, FIRST               

      

 

                2016           ANGEL WASHINGTON MD           Ot      

        R10.2      

                          PELVIC AND PERINEAL PAIN                    

 

                2016           ANGEL WASHINGTON MD T           Ot      

        Z3A.10     

                          10 WEEKS GESTATION OF PREGNANCY                    

 

                09/15/2016           ANGEL WASHINGTON MD T           Ot      

        N89.8      

                          OTHER SPECIFIED NONINFLAMMATORY DISORDER              

      

 

                09/15/2016           ANGEL WASHINGTON MD           Ot      

        O23.591    

                          INFECTION OTH PRT GENITL TRCT IN PREGNAN              

      

 

                09/15/2016           ANGEL WASHINGTON MD T           Ot      

        O26.891    

                          OTH PREGNANCY RELATED CONDITIONS, FIRST               

      

 

                09/15/2016           ANGEL WASHINGTON MD           Ot      

        R10.2      

                          PELVIC AND PERINEAL PAIN                    

 

                09/15/2016           ANGEL WASHINGTON MD T           Ot      

        Z3A.10     

                          10 WEEKS GESTATION OF PREGNANCY                    

 

                2016           ANGEL WASHINGTON MD T           Ot      

        N89.8      

                          OTHER SPECIFIED NONINFLAMMATORY DISORDER              

      

 

                2016           ANGEL WASHINGTON MD T           Ot      

        O23.591    

                          INFECTION OTH PRT GENITL TRCT IN PREGNAN              

      

 

                2016           ANGEL WASHINGTON MD           Ot      

        O26.891    

                          OTH PREGNANCY RELATED CONDITIONS, FIRST               

      

 

                2016           ANGEL WASHINGTON MD T           Ot      

        R10.2      

                          PELVIC AND PERINEAL PAIN                    

 

                2016           ANGEL WASHINGTON MD T           Ot      

        Z3A.10     

                          10 WEEKS GESTATION OF PREGNANCY                    

 

             2017           MARCELO OLIVEIRA MD           Ot           O26.

93           

PREGNANCY RELATED CONDITIONS, UNSPECIFIE                    

 

             2017           MARCELO OLIVEIRA MD           Ot           R10.

2           

PELVIC AND PERINEAL PAIN                         

 

             2017           MARCELO OLIVEIRA MD           Ot           Z3A.

30           

30 WEEKS GESTATION OF PREGNANCY                    

 

             2017           MARCELO OLIVEIRA MD           Ot           O26.

93           

PREGNANCY RELATED CONDITIONS, UNSPECIFIE                    

 

             2017           MARCELO OLIVEIRA MD           Ot           R10.

2           

PELVIC AND PERINEAL PAIN                         

 

             2017           MARCELO OLIVEIRA MD           Ot           Z3A.

30           

30 WEEKS GESTATION OF PREGNANCY                    

 

             2017           MARCELO OLIVEIRA MD           Ot           O47.

1           

FALSE LABOR AT OR AFTER 37 COMPLETED WEE                    

 

             2017           MARCELO OLIVEIRA MD           Ot           Z3A.

37           

37 WEEKS GESTATION OF PREGNANCY                    

 

             2017           MARCELO OLIVEIRA MD           Ot           O47.

1           

FALSE LABOR AT OR AFTER 37 COMPLETED WEE                    

 

             2017           MARCELO OLIVEIRA MD           Ot           Z3A.

37           

37 WEEKS GESTATION OF PREGNANCY                    

 

                2017           FENECH DO, KAY S           Ot          

    O47.1          

                          FALSE LABOR AT OR AFTER 37 COMPLETED WEE              

      

 

                2017           FENECH DO KAY S           Ot          

    Z3A.38         

                          38 WEEKS GESTATION OF PREGNANCY                    

 

                2017           FENECH DOKAY S           Ot          

    O47.1          

                          FALSE LABOR AT OR AFTER 37 COMPLETED WEE              

      

 

                2017           FENECH DOKAY S           Ot          

    Z3A.38         

                          38 WEEKS GESTATION OF PREGNANCY                    

 

             2017           MARCELO OLIVEIRA MD           Ot           D50.

9           

IRON DEFICIENCY ANEMIA, UNSPECIFIED                    

 

             2017           MARCELO OLIVEIRA MD           Ot           O48.

0           

POST-TERM PREGNANCY                              

 

                2017           MARCELO OLIVEIRA MD           Ot            

  O69.2XX0         

                          LABOR AND DEL COMP BY OTH CORD ENTANGLE,              

      

 

                2017           MARCELO OLIVEIRA MD           Ot            

  O69.81X0         

                          LABOR AND DEL COMP BY CORD AROUND NECK,               

      

 

                2017           MARCELO OLIVEIRA MD           Ot            

  O99.013          

                          ANEMIA COMPLICATING PREGNANCY, THIRD TRI              

      

 

             2017           MARCELO OLIVEIRA MD           Ot           Z37.

0           

SINGLE LIVE BIRTH                                

 

             2017           TEE SHEEHAN, MARCELO COSBY           Ot           Z3A.

40           

40 WEEKS GESTATION OF PREGNANCY                    

 

                2017           MARCELO OLIVEIRA MD           Ot            

  Z87.891          

                          PERSONAL HISTORY OF NICOTINE DEPENDENCE               

     

 

           2017                       Ot           787.02                 

           

   

 

           2017                       Ot           998.9                  

           

  

 

           2017                       Ot           E878.6                 

           

   

 

           2017                       Ot           V58.69                 

           

   

 

           2017                       Ot           787.02                 

           

   

 

           2017                       Ot           998.9                  

           

  

 

           2017                       Ot           E878.6                 

           

   

 

           2017                       Ot           V58.69                 

           

   

 

             2019           DEREK DOMINGO           Ot           F32.9   

        MAJOR

DEPRESSIVE DISORDER, SINGLE EPISOD                    

 

             2019           DEREK DOMINGO           Ot           F41.9   

        

ANXIETY DISORDER, UNSPECIFIED                    

 

             2019           THOMAS DOMINGOIS           Ot           S81.011A

           

LACERATION WITHOUT FOREIGN BODY, RIGHT K                    

 

             2019           DEREK DOMINGO           Ot           W25.XXXA

           

CONTACT WITH SHARP GLASS, INITIAL ENCOUN                    

 

             2019           DEREK DOMINGO           Ot           Z23     

      

ENCOUNTER FOR IMMUNIZATION                       

 

             2019           DEREK DOMINGO           Ot           Z80.0   

        

FAMILY HISTORY OF MALIGNANT NEOPLASM OF                     

 

             2019           DEREK DOMINGO           Ot           Z80.1   

        

FAMILY HISTORY OF MALIG NEOPLASM OF TRAC                    

 

             2019           DEREK DOMINGO           Ot           Z80.3   

        

FAMILY HISTORY OF MALIGNANT NEOPLASM OF                     

 

             2019           THOMAS DOMINGOIS           Ot           Z82.49  

         

FAMILY HX OF ISCHEM HEART DIS AND OTH DI                    

 

             2019           DEREK DOMINGO           Ot           Z86.19  

         

PERSONAL HISTORY OF OTHER INFECTIOUS AND                    

 

             2019           DEREK DOMINGO           Ot           Z87.448 

          

PERSONAL HISTORY OF OTHER DISEASES OF UR                    

 

             2019           DEREK DOMINGO           Ot           Z87.891 

          

PERSONAL HISTORY OF NICOTINE DEPENDENCE                    

 

             2019           DEREK DOMINGO           Ot           Z90.49  

         

ACQUIRED ABSENCE OF OTHER SPECIFIED PART                    

 

             2019           DEREK DOMINGO           Ot           Z90.89  

         

ACQUIRED ABSENCE OF OTHER ORGANS                    

 

             2019           DEREK DOMINGO           Ot           F32.9   

        MAJOR

DEPRESSIVE DISORDER, SINGLE EPISOD                    

 

             2019           THOMAS DOMINGOIS           Ot           F41.9   

        

ANXIETY DISORDER, UNSPECIFIED                    

 

             2019           DEREK DOMINGO           Ot           S81.011A

           

LACERATION WITHOUT FOREIGN BODY, RIGHT K                    

 

             2019           DEREK DOMINGO           Ot           W25.XXXA

           

CONTACT WITH SHARP GLASS, INITIAL ENCOUN                    

 

             2019           THOMAS DOMINGOIS           Ot           Z23     

      

ENCOUNTER FOR IMMUNIZATION                       

 

             2019           DEREK DOMINGO           Ot           Z80.0   

        

FAMILY HISTORY OF MALIGNANT NEOPLASM OF                     

 

             2019           DEREK DOMINGO           Ot           Z80.1   

        

FAMILY HISTORY OF MALIG NEOPLASM OF TRAC                    

 

             2019           THOMAS DOMINGOIS           Ot           Z80.3   

        

FAMILY HISTORY OF MALIGNANT NEOPLASM OF                     

 

             2019           THOMAS DOMINGOIS           Ot           Z82.49  

         

FAMILY HX OF ISCHEM HEART DIS AND OTH DI                    

 

             2019           DEREK DOMINGO           Ot           Z86.19  

         

PERSONAL HISTORY OF OTHER INFECTIOUS AND                    

 

             2019           THOMAS DOMINGOIS           Ot           Z87.448 

          

PERSONAL HISTORY OF OTHER DISEASES OF UR                    

 

             2019           THOMAS DOMINGOIS           Ot           Z87.891 

          

PERSONAL HISTORY OF NICOTINE DEPENDENCE                    

 

             2019           DEREK DOMINGO           Ot           Z90.49  

         

ACQUIRED ABSENCE OF OTHER SPECIFIED PART                    

 

             2019           DEREK DOMINGO           Ot           Z90.89  

         

ACQUIRED ABSENCE OF OTHER ORGANS                    

 

           2020                       Ot           787.02                 

           

   

 

           2020                       Ot           998.9                  

           

  

 

           2020                       Ot           E878.6                 

           

   

 

           2020                       Ot           V58.69                 

           

   



                                                                                
                                                                                
                                              



Procedures

      



                Code            Description           Performed By           Per

formed On        

 

                                      [id number] 

CON[facility], EXTE                                               04/10/2017       

 



                  



Results

      



                    Test                Result              Range        

 

                                        Complete urinalysis with reflex to cultu

re - 08/15/16 20:24         

 

                    Urine color determination           YELLOW              NRG 

       

 

                    Urine clarity determination           CLEAR               NR

G        

 

                    Urine pH measurement by test strip           7              

     5-9        

 

                    Specific gravity of urine by test strip           1.005     

          1.016-1.022  

     

 

                    Urine protein assay by test strip, semi-quantitative        

   1+                  

NEGATIVE        

 

                    Urine glucose detection by automated test strip           NE

GATIVE            

NEGATIVE        

 

                          Erythrocytes detection in urine sediment by light micr

oscopy           5+       

                                        NEGATIVE        

 

                    Urine ketones detection by automated test strip           NE

GATIVE            

NEGATIVE        

 

                    Urine nitrite detection by test strip           NEGATIVE    

        NEGATIVE    

   

 

                    Urine total bilirubin detection by test strip           NEGA

TIVE            

NEGATIVE        

 

                          Urine urobilinogen measurement by automated test strip

 (mass/volume)           

NORMAL                                  NORMAL        

 

                    Urine leukocyte esterase detection by dipstick           3+ 

                 NEGATIVE 

      

 

                                        Automated urine sediment erythrocyte cou

nt by microscopy (number/high power 

field)                     [HPF]                    NRG        

 

                                        Automated urine sediment leukocyte count

 by microscopy (number/high power field)

                           [HPF]                    NRG        

 

                          Bacteria detection in urine sediment by light microsco

py           TRACE        

                                        NRG        

 

                                        Squamous epithelial cells detection in u

rine sediment by light microscopy       

                          0-2                       NRG        

 

                          Crystals detection in urine sediment by light microsco

py           NONE         

                                        NRG        

 

                    Casts detection in urine sediment by light microscopy       

    NONE                

NRG        

 

                          Mucus detection in urine sediment by light microscopy 

          NEGATIVE        

                                        NRG        

 

                    Complete urinalysis with reflex to culture           YES    

             NRG        

 

                                        Bacterial urine culture - 08/15/16 20:24

         

 

                    Bacterial urine culture           [id number]            NRG   

     

 

                    COLONY COUNT           >100,000/ML            NRG        

 

                    FTX;REPORTABLE           SENSITIVITY REPORTED 16 13:30 

           NRG      

  

 

                    URINE CULTURE RESULTS           <10,000/ML            NRG   

     

 

                                        Bacterial susceptibility panel - 08/15/1

6 20:24         

 

                          Gentamicin susceptibility test by minimum inhibitory c

oncentration           <= 

                                        NRG        

 

                                        Trimethoprim/sulfamethoxazole susceptibi

lity test by minimum 

inhibitoryconcentration           <=                        NRG        

 

                          Ampicillin susceptibility test by minimum inhibitory c

oncentration           <= 

                                        NRG        

 

                          Tobramycin susceptibility test by minimum inhibitory c

oncentration           <= 

                                        NRG        

 

                          Cefazolin susceptibility test by minimum inhibitory co

ncentration           <=  

                                        NRG        

 

                          Ceftriaxone susceptibility test by minimum inhibitory 

concentration           <=

                                        NRG        

 

                                        Ampicillin/sulbactam susceptibility test

 by minimum inhibitory concentration    

                          <=                        NRG        

 

                                        Piperacillin/tazobactam susceptibility t

est by minimum inhibitory concentration 

                          <=                        NRG        

 

                          Ciprofloxacin susceptibility test by minimum inhibitor

y concentration           

<=                                      NRG        

 

                          Meropenem susceptibility test by minimum inhibitory co

ncentration           0.5 

                                        NRG        

 

                                        Nitrofurantoin susceptibility test by mi

nimum inhibitory concentration          

                          128                       NRG        

 

                          Aztreonam susceptibility test by minimum inhibitory co

ncentration           <=  

                                        NRG        

 

                                        Complete urinalysis with reflex to cultu

re - 16 20:26         

 

                    Urine color determination           YELLOW              NRG 

       

 

                    Urine clarity determination           CLEAR               NR

G        

 

                    Urine pH measurement by test strip           7              

     5-9        

 

                    Specific gravity of urine by test strip           1.010     

          1.016-1.022  

      

 

                          Urine protein assay by test strip, semi-quantitative  

         NEGATIVE         

                                        NEGATIVE        

 

                    Urine glucose detection by automated test strip           NE

GATIVE            

NEGATIVE        

 

                          Erythrocytes detection in urine sediment by light micr

oscopy           NEGATIVE 

                                        NEGATIVE        

 

                    Urine ketones detection by automated test strip           NE

GATIVE            

NEGATIVE        

 

                    Urine nitrite detection by test strip           NEGATIVE    

        NEGATIVE    

    

 

                    Urine total bilirubin detection by test strip           NEGA

TIVE            

NEGATIVE        

 

                          Urine urobilinogen measurement by automated test strip

 (mass/volume)           

NORMAL                                  NORMAL        

 

                    Urine leukocyte esterase detection by dipstick           2+ 

                 NEGATIVE 

       

 

                                        Automated urine sediment erythrocyte cou

nt by microscopy (number/high power 

field)                    NONE                      NRG        

 

                                        Automated urine sediment leukocyte count

 by microscopy (number/high power field)

                           [HPF]                    NRG        

 

                          Bacteria detection in urine sediment by light microsco

py           FEW          

                                        NRG        

 

                                        Squamous epithelial cells detection in u

rine sediment by light microscopy       

                          2-5                       NRG        

 

                          Crystals detection in urine sediment by light microsco

py           NONE         

                                        NRG        

 

                    Casts detection in urine sediment by light microscopy       

    NONE                

NRG        

 

                          Mucus detection in urine sediment by light microscopy 

          NEGATIVE        

                                        NRG        

 

                    Complete urinalysis with reflex to culture           NO     

             NRG        

 

                                        Bacterial urine culture - 16 20:26

         

 

                    Bacterial urine culture           [id number]            NRG   

     

 

                    COLONY COUNT           >100,000/ML            NRG        

 

                                        Complete blood count (CBC) with automate

d white blood cell (WBC) differential - 

16 20:41         

 

                          Blood leukocytes automated count (number/volume)      

     11.8 10*3/uL         

                                        4.3-11.0        

 

                          Blood erythrocytes automated count (number/volume)    

       4.27 10*6/uL       

                                        4.35-5.85        

 

                    Venous blood hemoglobin measurement (mass/volume)           

12.4 g/dL           

11.5-16.0        

 

                    Blood hematocrit (volume fraction)           37 %           

     35-52        

 

                    Automated erythrocyte mean corpuscular volume           87 [

foz_us]           

80-99        

 

                                        Automated erythrocyte mean corpuscular h

emoglobin (mass per erythrocyte)        

                          29 pg                     25-34        

 

                                        Automated erythrocyte mean corpuscular h

emoglobin concentration measurement 

(mass/volume)             33 g/dL                   32-36        

 

                    Automated erythrocyte distribution width ratio           13.

5 %              10.0-

14.5        

 

                    Automated blood platelet count (count/volume)           188 

10*3/uL           

130-400        

 

                          Automated blood platelet mean volume measurement      

     11.2 [foz_us]        

                                        7.4-10.4        

 

                    Automated blood neutrophils/100 leukocytes           66 %   

             42-75       

 

 

                    Automated blood lymphocytes/100 leukocytes           22 %   

             12-44       

 

 

                    Blood monocytes/100 leukocytes           7 %                

 0-12        

 

                    Automated blood eosinophils/100 leukocytes           5 %    

             0-10        

 

                    Automated blood basophils/100 leukocytes           0 %      

           0-10        

 

                    Blood neutrophils automated count (number/volume)           

7.8 10*3            

1.8-7.8        

 

                    Blood lymphocytes automated count (number/volume)           

2.7 10*3            

1.0-4.0        

 

                    Blood monocytes automated count (number/volume)           0.

9 10*3            

0.0-1.0        

 

                    Automated eosinophil count           0.5 10*3/uL           0

.0-0.3        

 

                    Automated blood basophil count (count/volume)           0.0 

10*3/uL           

0.0-0.1        

 

                                        Serum or plasma choriogonadotropin measu

rement (units/volume) - 16 20:41  

       

 

                          Serum or plasma choriogonadotropin measurement (units/

volume)           [id number] 

m[iU]/mL                                <5        

 

                                        Bacteria identification in genital speci

men by aerobe culture - 16 21:00  

       

 

                    QUANTITY OF GROWTH           Moderate Growth            NRG 

       

 

                          Bacteria identification in genital specimen by aerobe 

culture           [id number]

                                        NRG        

 

                                        Microscopic examination by JYOTI preparati

on - 16 21:00         

 

                    Microscopic examination by KOH preparation           TNP    

             NRG        

 

                                        Microscopic examination by wet preparati

on - 16 21:00         

 

                    WET PREP RESULTS           9/15/16 14:45 ST            NRG  

      

 

                                        Chlamydia trachomatis DNA detection by p

robe and signal amplification method - 

16 21:00         

 

                                        Chlamydia trachomatis DNA detection by p

robe and target amplification method    

                          Negative                  Negative        

 

                                        Neisseria gonorrhoeae DNA detection by p

robe and signal amplification method - 

16 21:00         

 

                    Gonorrhea amp DNA-urine           Negative            Negati

ve        

 

                                        Complete urinalysis with reflex to cultu

re - 17 01:00         

 

                    Urine color determination           YELLOW              NRG 

       

 

                    Urine clarity determination           CLEAR               NR

G        

 

                    Urine pH measurement by test strip           6              

     5-9        

 

                    Specific gravity of urine by test strip           1.010     

          1.016-1.022  

      

 

                          Urine protein assay by test strip, semi-quantitative  

         NEGATIVE         

                                        NEGATIVE        

 

                    Urine glucose detection by automated test strip           NE

GATIVE            

NEGATIVE        

 

                          Erythrocytes detection in urine sediment by light micr

oscopy           NEGATIVE 

                                        NEGATIVE        

 

                    Urine ketones detection by automated test strip           NE

GATIVE            

NEGATIVE        

 

                    Urine nitrite detection by test strip           NEGATIVE    

        NEGATIVE    

    

 

                    Urine total bilirubin detection by test strip           NEGA

TIVE            

NEGATIVE        

 

                          Urine urobilinogen measurement by automated test strip

 (mass/volume)           

NORMAL                                  NORMAL        

 

                    Urine leukocyte esterase detection by dipstick           NEG

ATIVE            

NEGATIVE        

 

                                        Automated urine sediment erythrocyte cou

nt by microscopy (number/high power 

field)                    NONE                      NRG        

 

                                        Automated urine sediment leukocyte count

 by microscopy (number/high power field)

                          NONE                      NRG        

 

                          Bacteria detection in urine sediment by light microsco

py           NEGATIVE     

                                        NRG        

 

                                        Squamous epithelial cells detection in u

rine sediment by light microscopy       

                          2-5                       NRG        

 

                          Crystals detection in urine sediment by light microsco

py           NONE         

                                        NRG        

 

                    Casts detection in urine sediment by light microscopy       

    NONE                

NRG        

 

                          Mucus detection in urine sediment by light microscopy 

          NEGATIVE        

                                        NRG        

 

                    Complete urinalysis with reflex to culture           NO     

             NRG        

 

                                        Complete blood count (CBC) with automate

d white blood cell (WBC) differential - 

17 20:55         

 

                          Blood leukocytes automated count (number/volume)      

     12.3 10*3/uL         

                                        4.3-11.0        

 

                          Blood erythrocytes automated count (number/volume)    

       4.06 10*6/uL       

                                        4.35-5.85        

 

                    Venous blood hemoglobin measurement (mass/volume)           

10.7 g/dL           

11.5-16.0        

 

                    Blood hematocrit (volume fraction)           34 %           

     35-52        

 

                    Automated erythrocyte mean corpuscular volume           83 [

foz_us]           

80-99        

 

                                        Automated erythrocyte mean corpuscular h

emoglobin (mass per erythrocyte)        

                          26 pg                     25-34        

 

                                        Automated erythrocyte mean corpuscular h

emoglobin concentration measurement 

(mass/volume)             32 g/dL                   32-36        

 

                    Automated erythrocyte distribution width ratio           14.

0 %              10.0-

14.5        

 

                    Automated blood platelet count (count/volume)           209 

10*3/uL           

130-400        

 

                          Automated blood platelet mean volume measurement      

     12.3 [foz_us]        

                                        7.4-10.4        

 

                    Automated blood neutrophils/100 leukocytes           71 %   

             42-75       

 

 

                    Automated blood lymphocytes/100 leukocytes           18 %   

             12-44       

 

 

                    Blood monocytes/100 leukocytes           9 %                

 0-12        

 

                    Automated blood eosinophils/100 leukocytes           2 %    

             0-10        

 

                    Automated blood basophils/100 leukocytes           0 %      

           0-10        

 

                    Blood neutrophils automated count (number/volume)           

8.7 10*3            

1.8-7.8        

 

                    Blood lymphocytes automated count (number/volume)           

2.2 10*3            

1.0-4.0        

 

                    Blood monocytes automated count (number/volume)           1.

1 10*3            

0.0-1.0        

 

                    Automated eosinophil count           0.3 10*3/uL           0

.0-0.3        

 

                    Automated blood basophil count (count/volume)           0.0 

10*3/uL           

0.0-0.1        

 

                                        Blood type T Indirect antibody screen pa

[facility] - 17 20:55         

 

                    ABO+Rh group           AP                  NRG        

 

                    Transfusion band number           [id number]             NRG   

     

 

                    Blood group antibody screen           NEGATIVE            NR

G        

 

                                        Complete blood count (CBC) with automate

d white blood cell (WBC) differential - 

17 07:55         

 

                          Blood leukocytes automated count (number/volume)      

     11.1 10*3/uL         

                                        4.3-11.0        

 

                          Blood erythrocytes automated count (number/volume)    

       3.62 10*6/uL       

                                        4.35-5.85        

 

                    Venous blood hemoglobin measurement (mass/volume)           

9.4 g/dL            

11.5-16.0        

 

                    Blood hematocrit (volume fraction)           31 %           

     35-52        

 

                    Automated erythrocyte mean corpuscular volume           85 [

foz_us]           

80-99        

 

                                        Automated erythrocyte mean corpuscular h

emoglobin (mass per erythrocyte)        

                          26 pg                     25-34        

 

                                        Automated erythrocyte mean corpuscular h

emoglobin concentration measurement 

(mass/volume)             31 g/dL                   32-36        

 

                    Automated erythrocyte distribution width ratio           14.

3 %              10.0-

14.5        

 

                    Automated blood platelet count (count/volume)           158 

10*3/uL           

130-400        

 

                          Automated blood platelet mean volume measurement      

     11.8 [foz_us]        

                                        7.4-10.4        

 

                    Automated blood neutrophils/100 leukocytes           67 %   

             42-75       

 

 

                    Automated blood lymphocytes/100 leukocytes           23 %   

             12-44       

 

 

                    Blood monocytes/100 leukocytes           8 %                

 0-12        

 

                    Automated blood eosinophils/100 leukocytes           2 %    

             0-10        

 

                    Automated blood basophils/100 leukocytes           0 %      

           0-10        

 

                    Blood neutrophils automated count (number/volume)           

7.4 10*3            

1.8-7.8        

 

                    Blood lymphocytes automated count (number/volume)           

2.5 10*3            

1.0-4.0        

 

                    Blood monocytes automated count (number/volume)           0.

9 10*3            

0.0-1.0        

 

                    Automated eosinophil count           0.2 10*3/uL           0

.0-0.3        

 

                    Automated blood basophil count (count/volume)           0.0 

10*3/uL           

0.0-0.1        

 

                                        [facility] - 19 09:22         

 

                    GLUCOSE             83 mg/dL            65-99        

 

                    UREA NITROGEN (BUN)           10 mg/dL            7-25      

  

 

                    CREATININE           0.64 mg/dL           0.50-1.10        

 

                    eGFR NON-AFR. AMERICAN           121 mL/min/1.73m2          

 > OR = 60        

 

                    eGFR            140 mL/min/1.73m2           

> OR = 60        

 

                    BUN/CREATININE RATIO           NOT APPLICABLE (calc)        

   6-22        

 

                    SODIUM              140 mmol/L           135-146        

 

                    POTASSIUM           4.1 mmol/L           3.5-5.3        

 

                    CHLORIDE            106 mmol/L                   

 

                    CARBON DIOXIDE           26 mmol/L           20-32        

 

                    CALCIUM             9.0 mg/dL           8.6-10.2        

 

                    PROTEIN, TOTAL           6.8 g/dL            6.1-8.1        

 

                    ALBUMIN             4.3 g/dL            3.6-5.1        

 

                    GLOBULIN            2.5 g/dL (calc)           1.9-3.7       

 

 

                    ALBUMIN/GLOBULIN RATIO           1.7 (calc)           1.0-2.

5        

 

                    BILIRUBIN, TOTAL           0.5 mg/dL           0.2-1.2      

  

 

                    ALKALINE PHOSPHATASE           71 U/L                 

     

 

                    AST                 12 U/L              10-30        

 

                    ALT                 13 U/L              6-29        

 

                                        VITAMIN D, 25-H - 19 12:57        

 

 

                    VITAMIN D,25-OH,TOTAL,IA           38 ng/mL            30-10

0        

 

                                        VITAMIN B12 - 19 12:59         

 

                    VITAMIN B12           665 pg/mL           200-1100        



                                                    



Encounters

      



                ACCT No.           Visit Date/Time           Discharge          

 Status         

             Pt. Type           Provider           Facility           Loc./Unit 

          

Complaint        

 

                [id number]           2019 15:35:00           2019 23:59:5

9           CLS 

                Outpatient           ABRIL ALVARADO                           

[facility] 

WALK [facility]                                     

 

             [id number]           2019 12:20:00                              

       Document

 Registration                                                                   

 

 

             [id number]           2019 09:20:00                              

       Document

 Registration                                                                   

 

 

                    [id number]           2019 12:09:00           

019 13:30:00        

                DIS             Emergency           BERNOT, DEREK           Via

 [facility]           ER                        R KNEE LAC - DIZZY     

   

 

                    [id number]           2017 20:15:00           

017 11:50:00        

                DIS             Inpatient           MARCELO OLIVEIRA MD          

 [facility]           LDRP                      INDUCTION        

 

                    [id number]           2017 00:43:00           

017 03:50:00        

                DIS             Outpatient           KAY NEWMAN DO S       

    [facility]                       CONTRACTIONS,DISCHARGE

        

 

                    [id number]           2017 16:07:00           

017 18:15:00        

                DIS             Outpatient           MARCELO OLIVEIRA MD         

  [facility]                       PRESSURE, BACK PAIN    

    

 

                    [id number]           2017 16:06:00           

017 19:30:00        

                DIS             Outpatient           MARCELO OLIVEIRA MD         

  [facility]                       ABD CRAMPING        

 

                    [id number]           2016 19:38:00           

016 22:35:00        

                DIS             Emergency           BRUEGGEMANN MD, ANGEL ARELLANO    

       [facility]ia 

[facility]           ER                        CRAMPING;10 WEE

KS PREGNANT  

      

 

                    [id number]           2016 19:08:00           

016 20:16:00        

                DIS             Emergency           EVAN ZACARIAS         

  [facility]           ER                        8 WKS PREG/FEVER/SINUS 

ISSUES/DIZZINESS        

 

                    [id number]           08/15/2016 20:19:00           08/15/2

016 21:22:00        

                DIS             Emergency           ANGEL WASHINGTON MD    

       Via 

[facility]           ER                        7 WEEKS PREG/LO

W BACK PAIN  

      

 

                    [id number]           2016 13:58:00           

016 15:25:00        

                DIS             Emergency           EVAN ZACARIAS         

  [facility]           ER                        IUD MOVED/POSS PREG    

    

 

             [id number]           2008 17:30:00                         

            

Document Registration

## 2020-04-05 VITALS — SYSTOLIC BLOOD PRESSURE: 114 MMHG | DIASTOLIC BLOOD PRESSURE: 54 MMHG

## 2020-04-05 LAB
ALBUMIN SERPL-MCNC: 4.3 GM/DL (ref 3.2–4.5)
ALP SERPL-CCNC: 66 U/L (ref 40–136)
ALT SERPL-CCNC: 16 U/L (ref 0–55)
APTT PPP: (no result) S
BACTERIA #/AREA URNS HPF: (no result) /HPF
BASOPHILS # BLD AUTO: 0 10^3/UL (ref 0–0.1)
BASOPHILS NFR BLD AUTO: 0 % (ref 0–10)
BILIRUB SERPL-MCNC: 0.4 MG/DL (ref 0.1–1)
BILIRUB UR QL STRIP: NEGATIVE
BUN/CREAT SERPL: 13
CALCIUM SERPL-MCNC: 8.8 MG/DL (ref 8.5–10.1)
CHLORIDE SERPL-SCNC: 108 MMOL/L (ref 98–107)
CO2 SERPL-SCNC: 21 MMOL/L (ref 21–32)
CREAT SERPL-MCNC: 0.83 MG/DL (ref 0.6–1.3)
EOSINOPHIL # BLD AUTO: 0.3 10^3/UL (ref 0–0.3)
EOSINOPHIL NFR BLD AUTO: 3 % (ref 0–10)
ERYTHROCYTE [DISTWIDTH] IN BLOOD BY AUTOMATED COUNT: 14.9 % (ref 10–14.5)
ERYTHROCYTE [SEDIMENTATION RATE] IN BLOOD: 6 MM/HR (ref 0–20)
FIBRINOGEN PPP-MCNC: (no result) MG/DL
GFR SERPLBLD BASED ON 1.73 SQ M-ARVRAT: > 60 ML/MIN
GLUCOSE SERPL-MCNC: 93 MG/DL (ref 70–105)
GLUCOSE UR STRIP-MCNC: NEGATIVE MG/DL
HCT VFR BLD CALC: 40 % (ref 35–52)
HGB BLD-MCNC: 12.9 G/DL (ref 11.5–16)
KETONES UR QL STRIP: NEGATIVE
LEUKOCYTE ESTERASE UR QL STRIP: NEGATIVE
LYMPHOCYTES # BLD AUTO: 3.4 X 10^3 (ref 1–4)
LYMPHOCYTES NFR BLD AUTO: 31 % (ref 12–44)
MAGNESIUM SERPL-MCNC: 2 MG/DL (ref 1.6–2.4)
MANUAL DIFFERENTIAL PERFORMED BLD QL: NO
MCH RBC QN AUTO: 28 PG (ref 25–34)
MCHC RBC AUTO-ENTMCNC: 32 G/DL (ref 32–36)
MCV RBC AUTO: 87 FL (ref 80–99)
MONOCYTES # BLD AUTO: 0.6 X 10^3 (ref 0–1)
MONOCYTES NFR BLD AUTO: 6 % (ref 0–12)
NEUTROPHILS # BLD AUTO: 6.5 X 10^3 (ref 1.8–7.8)
NEUTROPHILS NFR BLD AUTO: 60 % (ref 42–75)
NITRITE UR QL STRIP: POSITIVE
PH UR STRIP: 6.5 [PH] (ref 5–9)
PLATELET # BLD: 216 10^3/UL (ref 130–400)
PMV BLD AUTO: 11.7 FL (ref 7.4–10.4)
POTASSIUM SERPL-SCNC: 3.8 MMOL/L (ref 3.6–5)
PROT SERPL-MCNC: 7.2 GM/DL (ref 6.4–8.2)
PROT UR QL STRIP: NEGATIVE
RBC #/AREA URNS HPF: (no result) /HPF
SODIUM SERPL-SCNC: 140 MMOL/L (ref 135–145)
SP GR UR STRIP: 1.02 (ref 1.02–1.02)
SQUAMOUS #/AREA URNS HPF: (no result) /HPF
WBC # BLD AUTO: 10.8 10^3/UL (ref 4.3–11)
WBC #/AREA URNS HPF: (no result) /HPF

## 2020-04-15 ENCOUNTER — HOSPITAL ENCOUNTER (EMERGENCY)
Dept: HOSPITAL 75 - ER | Age: 30
Discharge: HOME | End: 2020-04-15
Payer: COMMERCIAL

## 2020-04-15 VITALS — DIASTOLIC BLOOD PRESSURE: 75 MMHG | SYSTOLIC BLOOD PRESSURE: 112 MMHG

## 2020-04-15 VITALS — BODY MASS INDEX: 29.73 KG/M2 | HEIGHT: 66.02 IN | WEIGHT: 184.97 LBS

## 2020-04-15 DIAGNOSIS — R55: Primary | ICD-10-CM

## 2020-04-15 DIAGNOSIS — R10.12: ICD-10-CM

## 2020-04-15 LAB
ALBUMIN SERPL-MCNC: 4.5 GM/DL (ref 3.2–4.5)
ALP SERPL-CCNC: 77 U/L (ref 40–136)
ALT SERPL-CCNC: 13 U/L (ref 0–55)
AMORPH SED URNS QL MICRO: (no result) /LPF
APTT PPP: YELLOW S
BACTERIA #/AREA URNS HPF: (no result) /HPF
BASOPHILS # BLD AUTO: 0 10^3/UL (ref 0–0.1)
BASOPHILS NFR BLD AUTO: 0 % (ref 0–10)
BILIRUB SERPL-MCNC: 0.2 MG/DL (ref 0.1–1)
BILIRUB UR QL STRIP: NEGATIVE
BUN/CREAT SERPL: 13
CALCIUM SERPL-MCNC: 9.6 MG/DL (ref 8.5–10.1)
CHLORIDE SERPL-SCNC: 108 MMOL/L (ref 98–107)
CO2 SERPL-SCNC: 21 MMOL/L (ref 21–32)
CREAT SERPL-MCNC: 0.75 MG/DL (ref 0.6–1.3)
EOSINOPHIL # BLD AUTO: 0.4 10^3/UL (ref 0–0.3)
EOSINOPHIL NFR BLD AUTO: 4 % (ref 0–10)
ERYTHROCYTE [DISTWIDTH] IN BLOOD BY AUTOMATED COUNT: 14.7 % (ref 10–14.5)
FIBRINOGEN PPP-MCNC: (no result) MG/DL
GFR SERPLBLD BASED ON 1.73 SQ M-ARVRAT: > 60 ML/MIN
GLUCOSE SERPL-MCNC: 80 MG/DL (ref 70–105)
GLUCOSE UR STRIP-MCNC: NEGATIVE MG/DL
HCT VFR BLD CALC: 45 % (ref 35–52)
HGB BLD-MCNC: 14.4 G/DL (ref 11.5–16)
KETONES UR QL STRIP: NEGATIVE
LEUKOCYTE ESTERASE UR QL STRIP: NEGATIVE
LIPASE SERPL-CCNC: 29 U/L (ref 8–78)
LYMPHOCYTES # BLD AUTO: 3 X 10^3 (ref 1–4)
LYMPHOCYTES NFR BLD AUTO: 29 % (ref 12–44)
MANUAL DIFFERENTIAL PERFORMED BLD QL: NO
MCH RBC QN AUTO: 28 PG (ref 25–34)
MCHC RBC AUTO-ENTMCNC: 32 G/DL (ref 32–36)
MCV RBC AUTO: 88 FL (ref 80–99)
MONOCYTES # BLD AUTO: 0.6 X 10^3 (ref 0–1)
MONOCYTES NFR BLD AUTO: 6 % (ref 0–12)
NEUTROPHILS # BLD AUTO: 6.4 X 10^3 (ref 1.8–7.8)
NEUTROPHILS NFR BLD AUTO: 61 % (ref 42–75)
NITRITE UR QL STRIP: NEGATIVE
PH UR STRIP: 7 [PH] (ref 5–9)
PLATELET # BLD: 224 10^3/UL (ref 130–400)
PMV BLD AUTO: 11.6 FL (ref 7.4–10.4)
POTASSIUM SERPL-SCNC: 3.5 MMOL/L (ref 3.6–5)
PROT SERPL-MCNC: 7.8 GM/DL (ref 6.4–8.2)
PROT UR QL STRIP: NEGATIVE
RBC #/AREA URNS HPF: (no result) /HPF
SODIUM SERPL-SCNC: 142 MMOL/L (ref 135–145)
SP GR UR STRIP: 1.02 (ref 1.02–1.02)
SQUAMOUS #/AREA URNS HPF: (no result) /HPF
WBC # BLD AUTO: 10.5 10^3/UL (ref 4.3–11)
WBC #/AREA URNS HPF: (no result) /HPF

## 2020-04-15 PROCEDURE — 86141 C-REACTIVE PROTEIN HS: CPT

## 2020-04-15 PROCEDURE — 85025 COMPLETE CBC W/AUTO DIFF WBC: CPT

## 2020-04-15 PROCEDURE — 84703 CHORIONIC GONADOTROPIN ASSAY: CPT

## 2020-04-15 PROCEDURE — 83690 ASSAY OF LIPASE: CPT

## 2020-04-15 PROCEDURE — 81000 URINALYSIS NONAUTO W/SCOPE: CPT

## 2020-04-15 PROCEDURE — 36415 COLL VENOUS BLD VENIPUNCTURE: CPT

## 2020-04-15 PROCEDURE — 80053 COMPREHEN METABOLIC PANEL: CPT

## 2020-04-15 NOTE — XMS REPORT
Patient Summarization Differential

                             Created on: 04/15/2020



FARHAD PEREZ

External Reference #: [id number]

: 1990

Sex: Female



Demographics





                          Address                   [address] Phone                [phone]

 

                          Preferred Language        English

 

                          Marital Status            Unknown

 

                          Gnosticist Affiliation     Unknown

 

                          Race                      White

 

                          Ethnic Group              Unknown





Author





                          Author                    [facility]

 

                          Organization              [facility]

 

                          Address                   [address]  [address]



 

                          Phone                     [phone]







Care Team Providers





                    Care Team Member Name Role                Phone

 

                    MARCELO GARCIA     Unavailable         [phone]

 

                    NO, LOCAL PHYSICIAN Unavailable         Unavailable

 

                    MARCELO GARCIA MD  Unavailable         Unavailable

 

                    FELIX SHEEHAN, ANGEL ARELLANO Unavailable         Unavailable

 

                    EVAN ZACARIAS Unavailable         Unavailable

 

                    FENKAY CHAVEZ DO S Unavailable         Unavailable

 

                    PCP, NONE           Unavailable         Unavailable

 

                    BERNDEREK CAMPBELL      Unavailable         Unavailable

 

                    FELIX SHEEHAN, ANGEL T Unavailable         Unavailable

 

                    BERNOT ARNP, DEREK Unavailable         Unavailable

 

                    EVAN ZACARIAS Unavailable         Unavailable

 

                    MARCELO GARCIA MD  Unavailable         Unavailable

 

                    MARCELO GARCIA MD  Unavailable         Unavailable

 

                    ABRIL ALVARADO      Unavailable         Unavailable

 

                    FENSCOTT FARAH, KAY VALLECILLO Unavailable         Unavailable

 

                    YASIR ESTRADA MD Unavailable         Unavailable

 

                    VERONICA SHEEHAN, JEREMI CHING Unavailable         Unavailable



                                            



Allergies

                      



      



           Normalized  Allergy   Reported  Date of   Reaction(s)  Care Provider 

 Facility



                 Allergy Type    classification  allergen        Allergy Onset  

 

 

      



            DA (21     Unclassified  No Known Drug  2008 -  no information

  YASIR ESTRADA                                  Not Available



                 sources.)       Allergies       , MD            (35499)

 

      



            no information  Unclassified  NO KNOWN DRUG   NO KNOWN DRUG  SHAD RIGGINS  Not 

Available



              (1 source.)   ALLERGIES    ALLERGIES    SARTHAK     ([id number])



                                                                                
       



Medications

                      



        



         Medication  Ingredient  Drug    Dose    Dates   Status  Sig     Sig    

 Care



                 Class(es)       (Normalized)    (Original)      Provid



                                         er

 

        



         no      FENTANYL  no      20  no      no      no      no



         information    information   17 -    informat  information  info

rmation  

name



              (1 source.)  MCG/2CC      20     ion          (no



                     VIAL                17                  phone)

 

        



         no      Lactated  no      20  no      no      no      no



         information  Ringer's  information   17 -    informat  information  inf

ormation  

name



              (1 source.)  Solution     20     ion          (no



                           17                        phone)

 

        



         no      oxyCODONE  Opioid   20  no      no      no      no



           information   Agonist   17 -      informat  information  information 

 name



                 (1 source.)     05-30-20        ion             (no



                           17                        phone)



                                                                                
                 



Problems

                      Active Problems            

            



      



           Problem   Normalized  Date of   Normalized  Normalized  Provider  Fac

ility



              Classification  Problem(s)   Problem      Problem      Problem Sta

tus  



                           Onset/Resoluti            Duration   



                                         on    

 

      



           Residual  10 weeks  2020 -  Episodic  Active    ANGEL    [facility]

[facility]



                 codes;          gestation of     [facility]



                 unclassified    pregnancy       MD              Hospital -



                           (5 sources.)              [facility]



                                         ()

 

      



           Residual  30 weeks  2020 -  Episodic  Active    MARCELO TEE , 

 [facility]



                 codes;          gestation of     [facility]



                     unclassified        pregnancy           Hospital -



                           (6 sources.)              [facility]



                                         ()

 

      



           Residual  37 weeks  2020 -  Episodic  Active    MARCELO TEE , 

 [facility]



                 codes;          gestation of     [facility]



                     unclassified        pregnancy           Hospital -



                           (5 sources.)              [facility]



                                         ()

 

      



           Residual  38 weeks  2020 -  Episodic  Active    KAY NEWMAN 

 [facility]



                 codes;          gestation of     , DO            [facility]



                     unclassified        pregnancy           Hospital -



                           (7 sources.)              [facility]



                                         ()

 

      



           Residual  40 weeks  2020 -  Episodic  Active    MARCELO TEE , 

 [facility]



                 codes;          gestation of     [facility]



                     [facility]           [facility]



                           (5 sources.)              [facility]



                                         ()

 

      



           Residual  9 weeks   2020 -  Episodic  Active    EVAN ZACARIAS  [facility]

[facility]



                     codes;              gestation of        [facility]



                     unclassified        pregnancy           Hospital -



                           (5 sources.)              [facility]



                                         ()

 

      



           Residual  Acquired  2020 -  Episodic  Active    DEREK BERNOT  

[facility]



                     codes;              absence of          [facility]



                     unclassified        other organs        Hospital -



                           (9 sources.)              [facility]



                                         ()

 

      



           Residual  Acquired  2020 -  Episodic  Active    DEREK BERNOT  

[facility]



                     codes;              absence of          [facility]



                     unclassified        other               Hospital -



                     (9 sources.)        specified           [facility]



                           parts of                  ()



                                         digestive     



                                         tract     

 

      



           Other     Anemia    2020 -  Chronic   Active    MARCELO TEE , 

 [facility]



                 complications   complicating     [facility]



                     of pregnancy        pregnancy,          Hospital -



                     (9 sources.)        third               [facility]



                           trimester                 ()

 

      



           Anxiety   Anxiety   2020 -  Chronic   Active    DEREK BERNOT  

[facility]



                     disorders (10       disorder,           [facility]



                     sources.)           unspecified         Hospital -



                                         [facility]



                                         ()

 

      



           External cause  Contact with  2020 -  Episodic  Active    TRAVI

S BERNOT  

[facility]



                     codes:              sharp glass,        [facility]



                     Cut/nguyen (6       initial             Hospital -



                     sources.)           encounter           [facility]



                                         (38000)

 

      



           Other     Diseases of  2020 -  Episodic  Active    EVAN ZACARIAS 

 [facility]



                     complications       the                 [facility]



                     of pregnancy        respiratory         Hospital -



                     (9 sources.)        system              [facility]



                           complicating              (40695)



                                         pregnancy,     



                                         first     



                                         trimester     

 

      



           Conditions  Dizziness and  2020 -  Episodic  Active    PETER BA

JENNIFER  [facility]



                     associated          giddiness           [facility]



                           with dizziness            Hospital -



                           or vertigo (9             [facility]



                           sources.)                 (01076)

 

      



           Genitourinary  Dysuria   2020 -  Episodic  Active    ANGEL    

[facility]



                 symptoms and    Translations:     [facility]



                 ill-defined     [ PERSONAL      MD              Hospital -



                     conditions (18      HISTORY OF          [facility]



                     sources.)           OTHER DISEASES      (07956)



                                         OF UR]     

 

      



           Immunizations  Encounter for  2020 -  Episodic  Active    TRAVI

S BERNOT  

[facility]



                     and screening       immunization        [facility]



                           for infectious            Hospital -



                           disease (9                [facility]



                           sources.)                 (29751)

 

      



           Early or  False labor at  2020 -  Episodic  Active    MARCELO MCNU

LTY ,  [facility]



                 threatened      or after 37     [facility]



                     labor (17           completed           Hospital -



                     sources.)           weeks of            [facility]



                           gestation                 (34349)

 

      



           Residual  Family history  2020 -  Episodic  Active    DEREK BE

RNOT  [facility]



                     codes;              of ischemic         [facility]



                     unclassified        heart disease       Hospital -



                     (9 sources.)        and other           [facility]



                           diseases of               (89427)



                                         the     



                                         circulatory     



                                         system     

 

      



           Residual  Family history  2020 -  Episodic  Active    DEREK BE

RNOT  [facility]



                     codes;              of malignant        [facility]



                     unclassified        neoplasm of         Hospital -



                     (9 sources.)        breast              [facility]



                                         (63406)

 

      



           Residual  Family history  2020 -  Episodic  Active    DEREK BE

RNOT  [facility]



                     codes;              of malignant        [facility]



                     unclassified        neoplasm of         Hospital -



                     (9 sources.)        digestive           [facility]



                           organs                    (75600)

 

      



           Residual  Family history  2020 -  Episodic  Active    DEREK BE

RNOT  [facility]



                     codes;              of malignant        [facility]



                     unclassified        neoplasm of         Hospital -



                     (9 sources.)        trachea,            [facility]



                           bronchus and              (51914)



                                         lung     

 

      



           Other     Infection of  2020 -  Episodic  Active    ANGEL    V

[facility]



                 complications   other part of     [facility]



                 of pregnancy    genital tract     [facility] -



                     (9 sources.)        in pregnancy,       [facility]



                           first                     (44943)



                                         trimester     

 

      



           Deficiency and  Iron      2020 -  Episodic  Active    MARCELO MCNU

LTY ,  [facility]



                 other anemia    deficiency      [facility]



                     (9 sources.)        anemia,             Hospital -



                           unspecified               [facility]



                                         (14824)

 

      



           Umbilical cord  Labor and  2020 -  Episodic  Active    MARCELO ROSS ,  [facility]



                 complication    delivery        [facility]



                     (15 sources.)       complicated by      Hospital -



                           cord around               [facility]



                           neck, without             (65028)



                                         compression,     



                                         not applicable     



                                         or unspecified     



                                         Translations:     



                                         [ LABOR AND     



                                         DEL COMP BY     



                                         OT CORD     



                                         ENTANGLE,,     



                                         LABOR AND DEL     



                                         COMP BY OT     



                                         CORD     



                                         ENTANGLE,]     

 

      



           Open wounds of  Laceration  2020 -  Episodic  Active    DEREK 

BERNOT  [facility]



                     extremities         without             [facility]



                     (18 sources.)       foreign body,       Hospital -



                           right knee,               [facility]



                           initial                   (80191)



                                         encounter     

 

      



           Residual  Less than 8  2020 -  Episodic  Active    ANGEL    [facility]

[facility]



                 codes;          weeks           [facility]



                 unclassified    gestation of     [facility] -



                     (6 sources.)        pregnancy           [facility]



                                         ()

 

      



           Mood disorders  Major     2020 -  Chronic   Active    DEREK BE

RNOT  [facility]



                     (7 sources.)        depressive          [facility]



                           disorder,                 [facility] -



                           single                    [facility]



                           episode,                  (57404)



                                         unspecified     

 

      



           Other upper  Nasal     2020 -  Episodic  Active    EVAN ZACARIAS 

 [facility]



                     respiratory         congestion          [facility]



                           disease (9                Hospital -



                           sources.)                 [facility]



                                         (75506)

 

      



           Nausea and  Nausea alone  2020 -  Episodic  Active    YASIR GALLEGOS  [facility]



                     vomiting (3         , [facility]



                           sources.)                 [facility] -



                                         [facility]



                                         (83638)

 

      



           Substance-rela  Nicotine  2020 -  Chronic   Active    ANGEL   

 [facility]



                 abner disorders   dependence,     [facility]



                 (9 sources.)    cigarettes,     MD              [facility] -



                           uncomplicated             [facility]



                                         (24605)

 

      



           Other female  Other     2020 -  Episodic  Active    ANGEL    V

[facility]



                 genital         specified       [facility]



                 disorders (9    noninflammator     MD              Hospital -



                     sources.)           y disorders of      [facility]



                           vagina                    (08962)

 

      



           Other     Other     2020 -  Episodic  Active    ANGEL    [facility]

ia



                 complications   specified       [facility]



                 of pregnancy    pregnancy       MD              [facility] -



                     (9 sources.)        related             [facility]



                           conditions,               (40091)



                                         first     



                                         trimester     

 

      



           Abdominal pain  Pelvic and  2020 -  Episodic  Active    ANGEL 

   [facility]



                 (16 sources.)   perineal pain     [facility] MD                        [facility] -



                                         [facility]



                                         (98581)

 

      



           Screening and  Personal  2020 -  Episodic  Active    MARCELO MCNUL

TY ,  [facility]



                 history of      history of      [facility]



                     mental health       [facility]            Hospital -



                     and substance       dependence          [facility]



                           abuse codes               (93834)



                                         (18 sources.)      

 

      



           Other     Personal  2020 -  Episodic  Active    DEREK BERNADRIAN  

[facility]



                     infections;         history of          [facility]



                     including           other               Hospital -



                     parasitic (9        infectious and      [facility]



                     sources.)           parasitic           (64231)



                                         diseases     

 

      



           Prolonged  Post-term  2020 -  Episodic  Active    MARCELO TEE 

,  [facility]



                 pregnancy (9    pregnancy       [facility]



                           sources.)                 Hospital -



                                         [facility]



                                         (12262)

 

      



           Other     Pregnancy  2020 -  Episodic  Active    MARCELO TEE ,

  [facility]



                 complications   related         [facility]



                     of pregnancy        conditions,         Hospital -



                     (10 sources.)       unspecified,        [facility]



                           third                     (91214)



                                         trimester     

 

      



           Other lower  Shortness of  2020 -  Episodic  Active    JEREMI 

  [facility]



                 respiratory     breath          MD [facility] MARTIN



                           disease (1                Hospital -



                           source.)                  [facility]



                                         (77375)

 

      



           Other     Single live  2020 -  Episodic  Active    MARCELO TEE

 ,  [facility]



                 pregnancy and   birth           [facility]



                           delivery                  Hospital -



                           including                 [facility]



                           normal (9                 (95103)



                                         sources.)      

 

      



           Other     Unspecified  2020 -  Episodic  Active    ANGEL    [facility]

[facility]



                 complications   infection of     [facility]



                 of pregnancy    urinary tract     MD              Hospital -



                     (9 sources.)        in pregnancy,       [facility]



                           first                     (74240)



                                         trimester     



            

            Past or Other Problems            

            



      



           Problem   Normalized  Date of   Normalized  Normalized  Provider  Fac

ility



              Classification  Problem(s)   Problem      Problem      Problem Sta

tus  



                           Onset/Resoluti            Duration   



                                         on    

 

      



            Residual   10 weeks   no information  no information  [facility]     Not

 Available



                 codes;          gestation of     FELIX   (40059)



                     unclassified        pregnancy           MD 



                                         (4 sources.)      

 

      



            Residual   30 weeks   no information  no information  MARCELO TEE ,

  Not 

Available



                 codes;          gestation of     MD              (60093)



                           unclassified              pregnancy     



                                         (4 sources.)      

 

      



            Residual   37 weeks   no information  no information  MARCELOSUSSY GARCIA ,

  Not 

Available



                 codes;          gestation of     MD              (66431)



                           unclassified              pregnancy     



                                         (4 sources.)      

 

      



            Residual   38 weeks   no information  no information  KAY FENECH

  Not 

Available



                 codes;          gestation of     , DO            (04729)



                           unclassified              pregnancy     



                                         (1 source.)      

 

      



            Residual   40 weeks   no information  no information  MARCELO TEE ,

  [facility]



                 codes;          gestation of     [facility]



                     unclassified        pregnancy           Hospital -



                           (4 sources.)              [facility]



                                         (10283)

 

      



            Residual   9 weeks    no information  no information  EVAN COSBY

ot Available



                     codes;              gestation of        (94682)



                           unclassified              pregnancy     



                                         (4 sources.)      

 

      



            External cause  Contact with   no information  no information  AGNIESZKA DOMINGO  

[facility]



                     codes:              [facility] saavedra



                     Cut/nguyen (3       initial             Hospital -



                     sources.)           encounter           [facility]



                                         (74503)

 

      



            Contraceptive  Encounter for   Episodic   Completed  CHANDROUTIE  No

t Available



                 and             sterilization     Located within [facility]        (14371)



                           procreative               Translations:     



                           management (2             [ ENCOUNTER     



                           sources.)                 FOR     



                                         STERILIZATION]     

 

      



            Residual   Less than 8   no information  no information  ANG[facility]     

Not Available



                 codes;          weeks           BRUEGMONTRELL ,   (45337)



                     unclassified        gestation of        MD 



                           (3 sources.)              pregnancy     

 

      



            Mood disorders  Major      no information  no information  DEREK ADDISON  [facility]



                     (3 sources.)        depressive          [facility]



                           disorder,                 [facility] -



                           single                    [place]



                           episode,                  (04493)



                                         unspecified     

 

      



            Abdominal pain  Pelvic and   no information  no information  ANGEL 

    Not 

Available



                 (12 sources.)   perineal pain     FELIX   (95750)



                                         MD 

 

      



            Unclassified  no information  2020 -  no information  no infor

mil ESTRADA                            [facility]



                     (3 sources.)        , MD                [facility]



                                         (89115)

 

      



            Unclassified  no information  2020 -  no information  no infor

mil ESTRADA                            [facility]



                     (3 sources.)        , MD                [facility]



                                         (53879)

 

      



            Unclassified  no information  2020 -  no information  no infor

mil ESTRADA                            [facility]



                     (3 sources.)        , MD                [facility]



                                         (22502)

 

      



            Unclassified  no information  2020 -  no information  no infor

mil ESTRADA                            [facility]



                     (3 sources.)        , MD                [facility]



                                         (03486)



                                                                                
                                                                                
                                                                                
                                                                                
                                                                                
                                                                                
                                                                                
                                         



Procedures

                      



    



              Procedure    Normalized Procedure  Procedure Result  Performer    

Facility



                                         Date    

 

    



              04-   DELIVERY OF PRODUCTS  no information  no name (no randall

ne)  [facility] 

[facility]



                           OF CONCEPTION, EXTE       [facility]



                                         (10344)

 

    



                 DELIVERY OF PRODUCTS  no information  no name (no phone)  Not A

vailable 

(24942)



                                         OF CONCEPTION, EXTE   



                                                                                
       



Immunizations

                      



    



              Normalized   Immunization Date  Notes        Care Provider  Facili

ty



                                         Immunization    

 

    



              tetanus toxoid,  2019   no information  no name      [facility]

 [facility]



                           reduced diphtheria        [facility]



                           toxoid, and               (31068)



                                         acellular pertussis    



                                         vaccine, adsorbed    



                                                                              



Results

                      



     



            Test Name  Value      Interpretation  Reference Range  Date Time  Fa

cility



                     (Normalized)        (Normalized)        (Medline  



                                         Reference)  

 

 



                                         No panel



                                         information



                                         on null

 

     



                 Control         no information  (no code)       [facility]



                                         (70790)

 

     



                 Exp date        3/2020          (no code)       [facility]



                                         (06923)

 

     



                 Lot #           [id number]         (no code)       [facility]



                                         (25897)

 

 



                                         No panel



                                         information



                                         on 2019

 

     



              Basophils (Bld)  0.064 10*3/uL  (N)          0 - 0.3 10*3/uL   [facility]



                           [#/Vol]                   [facility]



                                         (95591)

 

     



              Basophils/100  0.7 %        (N)          0.5 - 1 %    [facility]

alth



                           WBC (Bld)                 [facility]



                                         (55647)

 

     



              Calcidiol    38 ng/mL     (N)          20 - 50 ng/mL   [facility]



                           [Mass/Vol]                [facility]



                                         (90282)

 

     



              Cobalamin    665 pg/mL    (N)          200 - 900 pg/mL   [facility]



                           (Vitamin B12)             [facility]



                           [Mass/Vol]                [facility]



                                         (31000)

 

     



              Eosinophils  0.218 10*3/uL  (N)          0.05 - 0.5   [facility]

alth



                     (Bld) [#/Vol]       10*3/uL             [facility]



                                         (15292)

 

     



              Eosinophils/100  2.4 %        (N)          1 - 4 %      [facility]



                           WBC (Bld)                 [facility]



                                         (51291)

 

     



              Erythrocyte  13.6 %       (N)          11.6 - 14.6 %   [facility]

[facility]               [facility]



                           [Ratio]                   (22901)

 

     



              Free T4      1.1 ng/dL    (N)          0.9 - 2.2 ng/dL   [facility]



                           [Mass/Vol]                [facility]



                                         (45497)

 

     



              Hematocrit (Bld)  41.9 %       (N)          36.1 - 50.3 %   [facility]



                           [[facility]



                                         (80888)

 

     



              Hemoglobin (Bld)  13.4 g/dL    (N)          12.1 - 17.2 g/dL   [facility]



                           [Mass/Vol]                [facility]



                                         (40910)

 

     



              Lymphocytes  2.83 10*3/uL  (N)          0.9 - 2.9    [facility]

lth



                     (Bld) [#/Vol]       10*3/uL             [facility]



                                         (32661)

 

     



              Lymphocytes/100  31.1 %       (N)          20 - 40 %    [facility]



                           WBC (Bld)                 [facility]



                                         (54067)

 

     



              MCH (RBC)    28.0 pg      (N)          27 - 31 pg   [facility]



                           [Entitic mass]            [facility]



                                         (68713)

 

     



              MCHC (RBC)   32.0 g/dL    (N)          32 - 36 g/dL   [facility]

alth



                           [Mass/Vol]                [facility]



                                         (45545)

 

     



              MCV (RBC)    87.5 fL      (N)          80 - 100 fL   [facility]

lt



                           [Entitic vol]             [facility]



                                         (25423)

 

     



              Monocytes (Bld)  0.555 10*3/uL  (N)          0.3 - 0.9    [facility]



                     [#/Vol]             10*3/uL             [facility]



                                         (55457)

 

     



              Monocytes/100  6.1 %        (N)          2 - 8 %      [facility]

alth



                           WBC (Bld)                 [facility]



                                         (86474)

 

     



              Neutrophils  5.433 10*3/uL  (N)          1.7 - 7 10*3/uL   [facility]



                           (Bld) [#/Vol]             [facility]



                                         (12524)

 

     



              Neutrophils/100  59.7 %       (N)          40 - 60 %    [facility]



                           WBC (Bld)                 [facility]



                                         (18095)

 

     



              Platelet mean  11.4 fL      (N)          7.2 - 11.7 fL   [facility]

 Health



                           volume (Bld)              [facility]



                           [Entitic vol]             [facility]



                                         (96392)

 

     



              Platelets (Bld)  244 10*3/uL  (N)          150 - 450    [facility] 

Health



                     [#/Vol]             10*3/uL             [facility]



                                         (05824)

 

     



              RBC (Bld)    4.79 10*6/uL  (N)          4.2 - 6.1    [facility]

lth



                     [#/Vol]             10*6/uL             [facility]



                                         (18053)

 

     



              TSH Qn       1.33 m[IU]/L  (N)          0.4 - 4 m[IU]/L   [facility]



                                         (88546)

 

     



              WBC (Bld)    9.1 10*3/uL  (N)          3.5 - 10.5   [facility]

th



                     [#/Vol]             10*3/uL             [facility]



                                         (66952)

 

 



                                         No panel



                                         information



                                         on 2019

 

     



                 BLO             no information  (no code)       [facility]

[facility]



                                         (90975)

 

     



                 KET             2019~clear  (no code)       [facility]

lth



                           ~yellow~none~neg          [facility]~negative~n          [facility]



                           egative                   (71867)

 

     



                 Lot #           [id number]          (no code)       [facility]



                                         (29322)

 

     



              pH (Bld)     6.0 [pH]     (no code)    7.38 - 7.42 [pH]   [facility]



                                         (91770)

 

     



              Protein (U)  no information  (no code)    0 - 20 mg/dL   [facility]



                           [Mass/Vol]                [facility]



                                         (11335)

 

     



                 SG              1.020           (no code)       [facility]



                                         (11492)

 

     



                 URO             0.2             (no code)       [facility]



                                         (44896)

 

 



                                         No panel



                                         information



                                         on 2019

 

     



              Albumin      4.3 g/dL     (N)          3.4 - 5.4 g/dL   [facility]



                           [Mass/Vol]                [facility]



                                         (90696)

 

     



              Albumin/Globulin  1.7 {ratio}  (N)          1 - 2.5 {ratio}   [facility]

[facility]



                           [Mass ratio]              [facility]



                                         (47764)

 

     



              ALP [Catalytic  71 U/L       (N)          44 - 147 U/L   [facility]



                           activity/Vol]             [facility]



                                         (51145)

 

     



              ALT [Catalytic  13 U/L       (N)          4 - 40 U/L   [facility] 

ealth



                           activity/Vol]             [facility]



                                         (62487)

 

     



              AST [Catalytic  12 U/L       (N)          10 - 34 U/L   [facility] 

Health



                           activity/Vol]             [facility]



                                         (60454)

 

     



              Bilirubin    0.5 mg/dL    (N)          0.1 - 1.2 mg/dL   [facility]



                           [Mass/Vol]                [facility]



                                         (70479)

 

     



              Calcium      9.0 mg/dL    (N)          8.5 - 10.2 mg/dL   [facility]



                           [Mass/Vol]                [facility]



                                         (71063)

 

     



              Chloride     106 mmol/L   (N)          95 - 106 mmol/L   [facility]

 Health



                           [Moles/Vol]               [facility]



                                         (61814)

 

     



              Cholesterol  133 mg/dL    (N)          180 - 200 mg/dL   [facility]



                           [Mass/Vol]                [facility]



                                         (48188)

 

     



                 Cholesterol in  50 mg/dL        (L)             [facility]



                           HDL [Mass/Vol]            [facility]



                                         (53434)

 

     



              Cholesterol in  73 mg/dL     (N)          0 - 100 mg/dL   [facility]



                           LDL [Mass/Vol]            [facility]



                                         (32293)

 

     



                 Cholesterol non  83 mg/dL        (N)             [facility]

th



                           HDL [Mass/Vol]            [facility]



                                         (42591)

 

     



                 Cholesterol.tota  2.7 {ratio}     (N)             [facility]



                           l/Cholesterol in          [facility]



                           HDL [Mass ratio]          [facility]



                                         (56849)

 

     



              CO2 [Moles/Vol]  26 mmol/L    (N)          23 - 29 mmol/L   [facility]



                                         (13822)

 

     



                 Creatinine      0.64 mg/dL      (N)             [facility]



                           [Mass/Vol]                [facility]



                                         (95674)

 

     



              GFR/1.73 sq M  140          (N)          90 - 120     [facility]



                 predicted among  mL/min/{1.73_m2}   mL/min/{1.73_m2}   [facility]

f [facility]



                           blacks MDRD               [facility]



                           (S/P/Bld) [Vol            (38745)



                                         rate/Area]     

 

     



              GFR/1.73 sq  121          (N)          90 - 120     [facility]

th



                 M.predicted MDRD  mL/min/{1.73_m2}   mL/min/{1.73_m2}   [facility]



                           (S/P/Bld) [Vol            [facility]



                           rate/Area]                (60046)

 

     



              Globulin (S)  2.5 g/dL     (N)          2 - 3.5 g/dL   [facility]

ealth



                           [Mass/Vol]                [facility]



                                         (53642)

 

     



              Glucose      83 mg/dL     (N)          60 - 125 mg/dL   [facility]



                           [Mass/Vol]                [facility]



                                         (10483)

 

     



              Potassium    4.1 mmol/L   (N)          3.7 - 5.2 mmol/L   [facility]



                           [Moles/Vol]               [facility]



                                         (16864)

 

     



              Protein      6.8 g/dL     (N)          6.4 - 8.3 g/dL   [facility]



                           [Mass/Vol]                [facility]



                                         (62514)

 

     



              Sodium       140 mmol/L   (N)          135 - 145 mmol/L   [facility]



                           [Moles/Vol]               [facility]



                                         (61666)

 

     



              Triglyceride  34 mg/dL     (N)          0 - 150 mg/dL   [facility]



                           [Mass/Vol]                [facility]



                                         (90022)

 

     



              Urea nitrogen  10 mg/dL     (N)          7 - 20 mg/dL   [facility]



                           [Mass/Vol]                [facility]



                                         (24497)

 

     



                 Urea            NOT APPLICABLE  (no code)       [facility]

h



                           nitrogen/Creatin          [facility] [Mass ratio]          [facility]



                                         (50096)

 

 



                                         No panel



                                         information



                                         on 2017

 

     



              Beta HCG     no information  (no code)    2017   Not Avail[facility]

le



                     (pregnancy test)     10:          (47575)



                                         Ql     

 

     



              CULTURE SOURCE  cath         (no code)    2017   Not Availab

le



                           11:200400                (84767)

 

     



              FINAL CULTURE  No Growth 48  (no code)    2017   Not Availab

le



                 RESULTS         hours           11:      (32515)

 

     



              MEDIA PLATED  Setup at 12:02  (no code)    2017   Not Availa

ble



                     on 2017        11:          (87749)

 

     



              PRELIM CULTURE  No Growth 24  (no code)    2017   Not Availa

ble



                 RESULTS         hours           11:      (27264)

 

 



                                         No panel



                                         information



                                         on 2017

 

     



            Basophils (Bld)  0.0 10*3/uL  (no code)  0 - 0.3 10*3/uL  2017

  Not 

Available



                     [#/Vol]             15:040400          (19456)

 

     



            Basophils/100  0.20 %     (no code)  0.5 - 1 %  2017  Not Avai

lable



                     WBC (Bld)           15:          (38490)

 

     



            Eosinophils  0.5 10*3/uL  (no code)  0.05 - 0.5  2017  Not [place]

ilable



                 (Bld) [#/Vol]    10*3/uL         15:0      (26615)

 

     



            Eosinophils/100  5.5 %      (no code)  1 - 4 %    2017  Not Av

ailable



                     WBC (Bld)           15:          (63163)

 

     



            Erythrocyte  15.7 %     (H)        11.6 - 14.6 %  2017  Not Av

ailable



                     distribution        15:          (80703)



                                         width (RBC)     



                                         [Ratio]     

 

     



            Hematocrit (Bld)  38.4 %     (no code)  36.1 - 50.3 %  2017  N

ot Available



                     [Volume             15:          (51745)



                                         fraction]     

 

     



            Hemoglobin (Bld)  11.9 g/dL  (L)        12.1 - 17.2 g/dL  2017

  Not Available



                     [Mass/Vol]          15:          (59067)

 

     



            Lymphocytes  2.27 10*3/uL  (no code)  0.9 - 2.9  2017  Not [place]

ilable



                 (Bld) [#/Vol]    10*3/uL         15:0      (50317)

 

     



            Lymphocytes/100  26.1 %     (no code)  20 - 40 %  2017  Not Av

ailable



                     WBC (Bld)           15:0          (75931)

 

     



            MCH (RBC)  26.2 pg    (L)        27 - 31 pg  2017  Not Availab

le



                     [Entitic mass]      15:0          (83350)

 

     



            MCHC (RBC)  31.0 g/dL  (L)        32 - 36 g/dL  2017  Not Avai

lable



                     [Mass/Vol]          15:0400          (34510)

 

     



            MCV (RBC)  84.6 fL    (no code)  80 - 100 fL  2017  Not Availa

ble



                     [Entitic vol]       15:0400          (34430)

 

     



            Monocytes (Bld)  0.6 10*3/uL  (no code)  0.3 - 0.9  2017  Not 

Available



                 [#/Vol]         10*3/uL         15:0      (58865)

 

     



            Monocytes/100  7.0 %      (no code)  2 - 8 %    2017  Not Avai

lable



                     WBC (Bld)           15:          (84957)

 

     



            Neutrophils  5.31 10*3/uL  (no code)  1.7 - 7 10*3/uL  2017  N

ot 

Available



                     (Bld) [#/Vol]       15:          (19989)

 

     



            Neutrophils/100  61.2 %     (no code)  40 - 60 %  2017  Not Av

ailable



                     WBC (Bld)           15:-0400          (20407)

 

     



            Platelet mean  12.1 fL    (H)        7.2 - 11.7 fL  2017  Not 

Available



                     volume (Bld)        15:0400          (23657)



                                         [Entitic vol]     

 

     



            Platelets (Bld)  217 10*3/uL  (no code)  150 - 450  2017  Not 

Available



                 [#/Vol]         10*3/uL         15:0400      (69756)

 

     



            RBC (Bld)  4.54 10*6/uL  (no code)  4.2 - 6.1  2017  Not Avail

able



                 [#/Vol]         10*6/uL         15:      (22857)

 

     



            WBC (Bld)  8.69 10*3/uL  (no code)  3.5 - 10.5  2017  Not Avai

lable



                 [#/Vol]         10*3/uL         15:040400      (37428)



                                                                                
                                                                                
                                                                                
                                                                                
                                                                                
                                                                                
                                                                                
                                                                                
                                                                                
                                                                                
                                                                                
                            



Vital Signs

                      The data below is from unstructured sources            



                    Vital                   Response                   Date/Time

                

 

                          Temperature (Fahrenheit)                   98.9 degree

s F (97.6 - 99.5)         

                                        08/15/2016 8:25pm                

 

                          Temperature (Calculated Celsius)                   37.

61802 degrees C (36.4 - 

37.5)                                   08/15/2016 8:25pm                

 

                    Temperature Source                   Temporal               

    08/15/2016 

8:25pm                

 

                    Pulse Rate (adult)                   94 bpm (60 - 90)       

            

08/15/2016 8:25pm                

 

                    Respiratory Rate                   18 bpm (12 - 24)         

          08/15/2016

 8:25pm                

 

                    O2 Sat by Pulse Oximetry                   98 % (88 - 100)  

                 

08/15/2016 8:25pm                

 

                    Blood Pressure                   126/77 mm Hg               

    08/15/2016 

8:25pm                

 

                     Blood Pressure Mean                   93 mm Hg             

      08/15/2016 

8:25pm                

 

                    Pain                                                       

 

                     Numeric Pain Scale                   8                   08

/15/2016 9:09pm     

           

 

                    Height (Feet)                   5 feet                   08/

15/2016 8:25pm      

          

 

                    Height (Inches)                   7 inches                  

 08/15/2016 8:25pm  

              

 

                          Height (Calculated Centimeters)                   170.

287186 cm                 

                                        08/15/2016 8:25pm                

 

                    Weight (Pounds)                   161 pounds                

   08/15/2016 8:25pm

                

 

                    Weight (Calculated Grams)                   78725.780 gm    

               

08/15/2016 8:25pm                

 

                          Weight (Calculated Kilograms)                   73.028

372 kilograms             

                                        08/15/2016 8:25pm                

 

                    Capillary Refill                                            

           

 

                     Capillary Refill                   Less Than 3 Seconds     

              

08/15/2016 8:25pm                

 

                    Height                   5 ft 7 in                          

         

 

                    Weight                   161 lb                             

      

 

                    Body Mass Index                   25.2 kg/m^2               

                    



            



                    Vital                   Response                   Date/Time

                

 

                          Temperature (Fahrenheit)                   98.9 degree

s F (97.6 - 99.5)         

                                        08/15/2016 9:22pm                

 

                          Temperature (Calculated Celsius)                   37.

67044 degrees C (36.4 - 

37.5)                                   08/15/2016 9:22pm                

 

                    Temperature Source                   Temporal               

    08/15/2016 

9:22pm                

 

                    Pulse Rate (adult)                   101 bpm (60 - 90)      

             

2016 7:38pm                

 

                    Respiratory Rate                   18 bpm (12 - 24)         

          2016

 7:38pm                

 

                    O2 Sat by Pulse Oximetry                   99 % (88 - 100)  

                 

2016 7:38pm                

 

                    Blood Pressure                   126/80 mm Hg               

    2016 

7:38pm                

 

                     Blood Pressure Mean                   95 mm Hg             

      2016 

7:38pm                

 

                    Pain                                                       

 

                     Numeric Pain Scale                   4                    7:38pm     

           

 

                    Height (Feet)                   5 feet                    7:38pm      

          

 

                    Height (Inches)                   6 inches                  

 2016 7:38pm  

              

 

                          Height (Calculated Centimeters)                   167.

102337 cm                 

                                        2016 7:38pm                

 

                    Weight (Pounds)                   164 pounds                

   2016 7:38pm

                

 

                    Weight (Calculated Grams)                   61428.780 gm    

               

08/15/2016 8:25pm                

 

                          Weight (Calculated Kilograms)                   74.389

149 kilograms             

                                        2016 7:38pm                

 

                    Capillary Refill                                            

           

 

                     Capillary Refill                   Less Than 3 Seconds     

              

2016 7:38pm                

 

                    Height                   5 ft 6 in                          

         

 

                    Weight                   164 lb                             

      

 

                    Body Mass Index                   26.5 kg/m^2               

                    



            



                    Vital                   Response                   Date/Time

                

 

                          Temperature (Fahrenheit)                   98.9 degree

s F (97.6 - 99.5)         

                                        2016 8:05pm                

 

                          Temperature (Calculated Celsius)                   37.

10180 degrees C (36.4 - 

37.5)                                   2016 8:05pm                

 

                    Temperature Source                   Temporal               

    2016 

8:05pm                

 

                    Pulse Rate (adult)                   75 bpm (60 - 90)       

            

2016 8:05pm                

 

                    Respiratory Rate                   16 bpm (12 - 24)         

          2016

 8:05pm                

 

                    O2 Sat by Pulse Oximetry                   98 % (88 - 100)  

                 

2016 8:05pm                

 

                    Blood Pressure                   128/67 mm Hg               

    2016 

8:05pm                

 

                     Blood Pressure Mean                   87 mm Hg             

      2016 

8:05pm                

 

                    Pain                                                       

 

                     Numeric Pain Scale                   6                    8:05pm     

           

 

                    Height (Feet)                   5 feet                    8:05pm      

          

 

                    Height (Inches)                   6 inches                  

 2016 8:05pm  

              

 

                          Height (Calculated Centimeters)                   167.

371044 cm                 

                                        2016 8:05pm                

 

                    Weight (Pounds)                   168 pounds                

   2016 8:05pm

                

 

                    Weight (Calculated Grams)                   00273.780 gm    

               

08/15/2016 8:25pm                

 

                          Weight (Calculated Kilograms)                   76.203

519 kilograms             

                                        2016 8:05pm                

 

                    Capillary Refill                                            

           

 

                     Capillary Refill                   Less Than 3 Seconds     

              

2016 8:05pm                

 

                    Height                   5 ft 6 in                          

         

 

                    Weight                   168 lb                             

      

 

                    Body Mass Index                   27.1 kg/m^2               

                    



            



                    Vital                   Response                   Date/Time

                

 

                          Temperature (Fahrenheit)                   97.0 degree

s F (97.6 - 99.5)         

                                        2016 2:30pm                

 

                          Temperature (Calculated Celsius)                   36.

25103 degrees C (36.4 - 

37.5)                                   2016 2:30pm                

 

                    Pulse Rate (adult)                   70 bpm (60 - 90)       

            

2016 2:30pm                

 

                    Respiratory Rate                   16 bpm (12 - 24)         

          2016

 2:30pm                

 

                    O2 Sat by Pulse Oximetry                   98 % (88 - 100)  

                 

2016 2:30pm                

 

                    Blood Pressure                   118/70 mm Hg               

    2016 

2:30pm                

 

                     Blood Pressure Mean                   86 mm Hg             

      2016 

2:30pm                

 

                    Pain                                                       

 

                     Pain Intensity                   0                   2016 2:30pm         

       

 

                    Height (Feet)                   5 feet                    2:30pm      

          

 

                    Height (Inches)                   6 inches                  

 2016 2:30pm  

              

 

                          Height (Calculated Centimeters)                   167.

227260 cm                 

                                        2016 2:30pm                

 

                    Weight (Pounds)                   160 pounds                

   2016 2:30pm

                

 

                          Weight (Calculated Kilograms)                   72.574

780 kilograms             

                                        2016 2:30pm                

 

                    Height                   5 ft 6 in                          

         

 

                    Weight                   160 lb                             

      

 

                    Body Mass Index                   25.8 kg/m^2               

                    



            



                    Vital                   Response                   Date/Time

                

 

                          Temperature (Fahrenheit)                   97.0 degree

s F (97.6 - 99.5)         

                                        2016 2:30pm                

 

                          Temperature (Calculated Celsius)                   36.

08114 degrees C (36.4 - 

37.5)                                   2016 2:30pm                

 

                    Pulse Rate (adult)                   70 bpm (60 - 90)       

            

2016 2:30pm                

 

                    Respiratory Rate                   16 bpm (12 - 24)         

          2016

 2:30pm                

 

                    O2 Sat by Pulse Oximetry                   98 % (88 - 100)  

                 

2016 2:30pm                

 

                    Blood Pressure                   118/70 mm Hg               

    2016 

2:30pm                

 

                     Blood Pressure Mean                   86 mm Hg             

      2016 

2:30pm                

 

                    Pain                                                       

 

                     Pain Intensity                   0                   2016 2:30pm         

       

 

                    Height (Feet)                   5 feet                    2:30pm      

          

 

                    Height (Inches)                   6 inches                  

 2016 2:30pm  

              

 

                          Height (Calculated Centimeters)                   167.

739494 cm                 

                                        2016 2:30pm                

 

                    Weight (Pounds)                   160 pounds                

   2016 2:30pm

                

 

                          Weight (Calculated Kilograms)                   72.574

780 kilograms             

                                        2016 2:30pm                

 

                    Height                   5 ft 6 in                          

         

 

                    Weight                   160 lb                             

      

 

                    Body Mass Index                   25.8 kg/m^2               

                    



                                                                    



Interventions

          No Information                                                        
  



Plan of Treatment

                      The data below is from unstructured sources            



                          Discharge Date                   08/15/16 9:22pm      

          

 

                          Disposition                   01 HOME, SELF-CARE      

          

 

                          Condition at Discharge                   Improved     

           

 

                          Instructions/Education Provided                   Urin

anthony Tract Infection in 

Women (ED)                

 

                          Prescriptions                   See Medication Section

                

 

                          Referrals                   [facility]ARCEL[facility]ARC[facility]          

          

                    

                    

NO,LOCAL PHYSICIAN - Primary Care Physician                                  

 

                          Additional Instructions/Education                   Fo

llow-up with Dr. Garcia 

in 48 hours to review urine culture results. Call                     

your office in the morning to schedule the appointment. Also obtain results of  
                   

the pelvic exam to ensure no signs of infection were found and ask if a repeat  
                   

pelvic exam is requested. Complete the entire course of antibiotics as          
           

prescribed. Drink plenty of clear liquids. Take Tylenol up to 1000 mg every 6   
                  

hours as needed for pain. Return to care if symptoms worsen.                    
 

                    

                    

                    

All discharge instructions reviewed with patient and/or family. Voiced          
           

understanding.                                  



            



                          Discharge Date                   16 8:16pm      

          

 

                          Disposition                   01 HOME, SELF-CARE      

          

 

                          Condition at Discharge                   Improved     

           

 

                          Instructions/Education Provided                   Uppe

r Respiratory Infection 

(ED)                

 

                          Prescriptions                   See Medication Section

                

 

                          Referrals                   MARCELO GARCIA MD - [facility] Physician          

          

                    

                    

NO,LOCAL PHYSICIAN - Primary Care Physician                                  

 

                          Additional Instructions/Education                   1.

 Medication as directed   

                  

                                        2. Follow-up with your obstetrician next

 week                     

                                        3. All discharge instructions reviewed w

ith patient and/or family. Voiced       

              

understanding.                                  



            



                          Discharge Date                   16 10:35pm     

           

 

                          Disposition                   01 HOME, SELF-CARE      

          

 

                          Condition at Discharge                   Improved     

           

 

                          Instructions/Education Provided                   Acut

e Abdominal Pain (ED)     

           

 

                          Prescriptions                   See Medication Section

                

 

                          Referrals                   MARCELO GARCIA MD [facility] Physician          

          

                    

                    

NO,LOCAL PHYSICIAN - Primary Care Physician                                  

 

                          Additional Instructions/Education                   Fo

llow-up with Dr. Garcia 

on Friday or early next week. Return to care if                     

symptoms worsen. You may take Tylenol for pain. Drink plenty of clear liquids.  
                   

 You need to review your vaginal and urine cultures with Dr. Garcia. Nothing   
                  

vaginal including intercourse until cleared by Dr. Garcia.                     

                    

                    

                    

All discharge instructions reviewed with patient and/or family. Voiced          
           

understanding.                                  



            



                          Discharge Date                   16 3:25pm      

          

 

                          Disposition                   01 HOME, SELF-CARE      

          

 

                          Condition at Discharge                   Improved     

           

 

                          Instructions/Education Provided                   NO I

NSTRUCTIONS GIVEN         

       

 

                          Prescriptions                   See Medication Section

                

 

                          Referrals                   NO,LOCAL PHYSICIAN - [facility] Physician         

           

                    

                    

THUY VELARDE,NAOMY LU MD, MD -                                   

 

                          Additional Instructions/Education                   1.

 Follow-up with [facility] to have a pelvic exam done                     

                                        2. Return to ER for any concerns        

             

All discharge instructions reviewed with patient and/or family. Voiced          
           

understanding.                                  



            



                          Discharge Date                   16 3:25pm      

          

 

                          Disposition                   01 HOME, SELF-CARE      

          

 

                          Condition at Discharge                   Improved     

           

 

                          Instructions/Education Provided                   NO I

NSTRUCTIONS GIVEN         

       

 

                          Prescriptions                   See Medication Section

                

 

                          Referrals                   NO,LOCAL PHYSICIAN - [facility] Physician         

           

                    

                    

THUY VELARDE,GLORIA COSBY MD -                     

                    

                    

NAOMY TOTH MD -                                   

 

                          Additional Instructions/Education                   1.

 Follow-up with [facility] to have a pelvic exam done                     

                                        2. Return to ER for any concerns        

             

All discharge instructions reviewed with patient and/or family. Voiced          
           

understanding.                                  



                                                                    



Goals

          No Information                                                        
  



Social History

          No Information                                                        
  



Functional Status

                      The data below is from unstructured sourcesNo functional 
status results.No functional status results.No functional status results.No 
functional status results.No functional status results.No functional status 
results.No functional status results.                                           
                         



Mental Status

          No Information                                                        
  



Encounters

                      



    



              Encounter    Normalized Encounter  Encounter Diagnosis  Care Provi

yuri  

Organization



                           Date                      Type   

 

    



              2020   Emergency department  no information  JEREMI HIDALGO MD  [facility] 

[address]



                 -               patient visit   (no phone)      [facility]



                           2020                (no phone)

 

    



              2019   Emergency department  no information  no name (no [facility]

ne)  no 

organization name



                     -                   patient visit       (no phone)



                                         2019   Emergency department  no information  DEREK LASSITER (no  [facility] 

[address]



                 -               patient visit   phone)          [facility]



                           2019                (no phone)

 

    



              2016   Emergency department  no information  ANGEL JOHNSON  [facility] [address]



                 -               patient visit   MD (no phone)   [facility]



                           2016                (no phone)

 

    



              2016   Emergency department  no information  EVAN ALVAREZ (no  [facility] 

[address]



                 -               patient visit   phone)          [facility]



                           2016                (no phone)

 

    



              08-   Emergency department  no information  ANGEL JOHNSON  [facility] [address]

 [address]



                 -               patient visit   MD (no phone)   [facility]



                           08-                (no phone)

 

    



              2017   Evaluation and  no information  no name (no phone)  n

o organization

 name



                     -                   management of       (no phone)



                           2017                inpatient   

 

    



              2017   Evaluation and  no information  MARCELO GARCIA MD (no

  [facility] [address] 

[address]



                 -               management of   phone)          [facility]



                     2017          inpatient           (no phone)

 

    



              2008   Patient encounter  no information  no name (no phone)

  no 

organization name



                                         (no phone)

 

    



              2020   Patient encounter  no information  JEREMI MARTIN MD  [facility] [address]



                     procedure           (no phone)          [facility]



                                         (no phone)

 

    



              2019   Patient encounter  no information  no name (no phone)

  no 

organization name



                           procedure                 (no phone)

 

    



              2019   Patient encounter  no information  no name (no phone)

  no 

organization name



                           procedure                 (no phone)

 

    



              2019   Patient encounter  no information  no name (no phone)

  no 

organization name



                           procedure                 (no phone)

 

    



              2019   Patient encounter  no information  no name (no phone)

  no 

organization name



                           procedure                 (no phone)

 

    



              2019   Patient encounter  no information  no name (no phone)

  no 

organization name



                           procedure                 (no phone)

 

    



              08-   Patient encounter  no information  no name (no phone)

  no 

organization name



                           procedure                 (no phone)

 

    



              08-   Patient encounter  no information  no name (no phone)

  no 

organization name



                           procedure                 (no phone)

 

    



              2019   Patient encounter  no information  no name (no phone)

  no 

organization name



                           procedure                 (no phone)

 

    



              2019   Patient encounter  no information  no name (no phone)

  no 

organization name



                           procedure                 (no phone)

 

    



              2019   Patient encounter  no information  no name (no phone)

  no 

organization name



                           procedure                 (no phone)

 

    



              2019   Patient encounter  no information  no name (no phone)

  no 

organization name



                           procedure                 (no phone)

 

    



              2019   Patient encounter  no information  no name (no phone)

  no 

organization name



                           procedure                 (no phone)

 

    



              2019   Patient encounter  no information  no name (no phone)

  no 

organization name



                           procedure                 (no phone)

 

    



              2019   Patient encounter  no information  no name (no phone)

  no 

organization name



                           procedure                 (no phone)

 

    



              2019   Patient encounter  no information  no name (no phone)

  no 

organization name



                           procedure                 (no phone)

 

    



              2019   Patient encounter  no information  no name (no phone)

  no 

organization name



                           procedure                 (no phone)

 

    



              2019   Patient encounter  no information  no name (no phone)

  no 

organization name



                           procedure                 (no phone)

 

    



              2018   Patient encounter  no information  no name (no phone)

  no 

organization name



                           procedure                 (no phone)

 

    



              2017   Patient encounter  no information  no name (no phone)

  no 

organization name



                     -                   procedure           (no phone)



                                         2017   Patient encounter  no information  no name (no phone)

  no 

organization name



                     -                   procedure           (no phone)



                                         2017   Patient encounter  no information  no name (no phone)

  no 

organization name



                     -                   procedure           (no phone)



                                         2017   Patient encounter  no information  no name (no phone)

  no 

organization name



                     -                   procedure           (no phone)



                                         2017   Patient encounter  no information  [facility]  [facility]



                 -               procedure       (no phone)      [facility]



                           2017                (no phone)

 

    



              2017   Patient encounter  no information  no name (no phone)

  no 

organization name



                     -                   procedure           (no phone)



                                         2017   Patient encounter  no information  MARCELO GARCIA MD 

(no  [facility]



                 -               procedure       phone)          [facility]



                           2017                (no phone)

 

    



              2017   Patient encounter  no information  no name (no phone)

  no 

organization name



                     -                   procedure           (no phone)



                                         2017   Patient encounter  no information  MARCELO GARCIA MD 

(no  [facility]



                 -               procedure       phone)          [facility]



                           2017                (no phone)

 

    



              2008   Patient encounter  no information  YASIR ESTRADA MD

 (no  [facility]



                     procedure           phone)              [facility]



                                         (no phone)

 

    



                 Patient encounter  no information  no name (no phone)  no organ

ization name



                           procedure                 (no phone)



                                                                                
                                                                                
                                                                                
                                                                                
                                                                                
                                                          



Medical Equipment

          No Information                                                        
  



Payers

                      



 



                           Normalized Payer          Value

 

 



                           Medicaid                  no information



                                                                              



Advance Directives

                      



                    Directive                   Response                   Recor

ded Date/Time       

         

 

                    Advance Directives                   No                   08

/15/16 8:25pm       

         

 

                    Health Care Power of                    No          

         08/15/16 

8:25pm                

 

                    Resuscitation Status                   Full Code            

       08/15/16 

8:25pm                



            



                    Directive                   Response                   Recor

ded Date/Time       

         

 

                    Advance Directives                   No                   08

/15/16 8:25pm       

         

 

                    Health Care Power of                    No          

         08/15/16 

8:25pm                



            



                    Directive                   Response                   Recor

ded Date/Time       

         

 

                    Advance Directives                   No                    8:05pm       

         

 

                    Health Care Power of                    No          

         16 

8:05pm                

 

                    Resuscitation Status                   Full Code            

       16 

8:05pm                



            



                    Directive                   Response                   Recor

ded Date/Time       

         

 

                    Advance Directives                   No                    2:33pm       

         

 

                    Health Care Power of                    No          

         16 

2:33pm                

 

                    Resuscitation Status                   Full Code            

       16 

2:33pm                



                                                                    



Discharge Instructions

          No hospital discharge instructions.No hospital discharge 
instructions.No hospital discharge instructions.No hospital discharge 
instructions.                                                



Additional Source Comments

          This clinical document has been generated using [employer] 
software that has been certified by the Office of the National Coordinator for 
Health Information Technology (ONC 15.99.04.3023.[id number].[id number]) and the 
National Committee for  (NCQA, as an eMeasure certified 
technology).            

            

FOR RECORDS PERTAINING TO PATIENTS WHO ARE OR HAVE BEEN ENROLLED IN A CHEMICAL D
EPENDENCY/SUBSTANCE ABUSE PROGRAM, SOME INFORMATION MAY BE OMITTED. This clinica
l summary was aggregated from multiple sources.  Caution should be exercised in 
using it in the provision of clinical care. This summary normalizes information 
from multiple sources, and as a consequence, information in this document may ma
terially change the coding, format and clinical context of patient data. In randal
tion, data may be omitted in some cases. CLINICAL DECISIONS SHOULD BE BASED ON T
HE PRIMARY CLINICAL RECORDS. [employer] provides no warranty or guara
ntee of the accuracy or completeness of information in this document.The followi
ng information is based on time limited clinical information

## 2020-04-15 NOTE — XMS REPORT
Continuity of Care Document

                             Created on: 04/15/2020



Iman Sims

External Reference #: [id number]

: 1990

Sex: Female



Demographics





                          Address                   [address] [place] [address]  [id number]

 

                          Home Phone                [phone] x

 

                          Preferred Language        Unknown

 

                          Marital Status            Unknown

 

                          Catholic Affiliation     Unknown

 

                          Race                      Unknown

 

                          Ethnic Group              Unknown





Author





                          Organization              Unknown

 

                          Address                   Unknown

 

                          Phone                     Unavailable



              



Allergies

      



             Active           Description           Code           Type         

  Severity   

                Reaction           Onset           Reported/Identified          

 

Relationship to Patient                 Clinical Status        

 

                Yes             No Known Drug Allergies           [id number]    

       Drug 

Allergy           Unknown           N/A                             2008  

      

                                                             



                  



Medications

      



There is no data.                  



Problems

      



             Date Dx Coded           Attending           Type           Code    

       

Diagnosis                               Diagnosed By        

 

             2016           EVAN ZACARIAS           Ot           R10

.2           

PELVIC AND PERINEAL PAIN                         

 

                08/15/2016           ANGEL WASHINGTON MD T           Ot      

        F17.210    

                          NICOTINE DEPENDENCE, CIGARETTES, UNCOMPL              

      

 

                08/15/2016           ANGEL WASHINGTON MD T           Ot      

        O23.41     

                          UNSP INFCT OF URINARY TRACT IN PREGNANCY              

      

 

                08/15/2016           ANGEL WASHINGTON MD T           Ot      

        R30.0      

                          DYSURIA                            

 

                08/15/2016           ANGEL WASHINGTON MD T           Ot      

        Z3A.01     

                          LESS THAN 8 WEEKS GESTATION OF PREGNANCY              

      

 

                2016           AGNEL WASHNIGTON MD T           Ot      

        F17.210    

                          NICOTINE DEPENDENCE, CIGARETTES, UNCOMPL              

      

 

                2016           ANGEL WASHINGTON MD T           Ot      

        O23.41     

                          UNSP INFCT OF URINARY TRACT IN PREGNANCY              

      

 

                2016           ANGEL WASHINGTON MD T           Ot      

        R30.0      

                          DYSURIA                            

 

                2016           ANGEL WASHINGTON MD T           Ot      

        Z3A.01     

                          LESS THAN 8 WEEKS GESTATION OF PREGNANCY              

      

 

             2016           EVAN ZACARIAS           Ot           J06

.9           

ACUTE UPPER RESPIRATORY INFECTION, UNSPE                    

 

                2016           EVAN ZACARIAS APRN           Ot           

   O99.511         

                          DISEASES OF THE RESP SYS COMP PREGNANCY,              

      

 

                2016           EVAN ZACARIAS           Ot           

   R09.81          

                          NASAL CONGESTION                    

 

             2016           EVAN ZACARIAS           Ot           R42

           

DIZZINESS AND GIDDINESS                          

 

                2016           EVAN ZACARIAS           Ot           

   Z3A.09          

                          9 WEEKS GESTATION OF PREGNANCY                    

 

             2016           EVAN ZACARIAS           Ot           J06

.9           

ACUTE UPPER RESPIRATORY INFECTION, UNSPE                    

 

                2016           EVAN ZACARIAS           Ot           

   O99.511         

                          DISEASES OF THE RESP SYS COMP PREGNANCY,              

      

 

                2016           EVAN ZACARIAS           Ot           

   R09.81          

                          NASAL CONGESTION                    

 

             2016           EVAN ZACARIAS APRN           Ot           R42

           

DIZZINESS AND GIDDINESS                          

 

                2016           EVAN ZACARIAS APRN           Ot           

   Z3A.09          

                          9 WEEKS GESTATION OF PREGNANCY                    

 

                2016           ANGEL WASHINGTON MD T           Ot      

        N89.8      

                          OTHER SPECIFIED NONINFLAMMATORY DISORDER              

      

 

                2016           ANGEL WASHINGTON MD T           Ot      

        O23.591    

                          INFECTION OTH PRT GENITL TRCT IN PREGNAN              

      

 

                2016           ANGEL WASHINGTON MD           Ot      

        O26.891    

                          OTH PREGNANCY RELATED CONDITIONS, FIRST               

      

 

                2016           ANGEL WASHINGTON MD           Ot      

        R10.2      

                          PELVIC AND PERINEAL PAIN                    

 

                2016           ANGEL WASHINGTON MD T           Ot      

        Z3A.10     

                          10 WEEKS GESTATION OF PREGNANCY                    

 

                09/15/2016           ANGEL WASHINGTON MD T           Ot      

        N89.8      

                          OTHER SPECIFIED NONINFLAMMATORY DISORDER              

      

 

                09/15/2016           ANGEL WASHINGTON MD           Ot      

        O23.591    

                          INFECTION OTH PRT GENITL TRCT IN PREGNAN              

      

 

                09/15/2016           ANGEL WASHINGTON MD T           Ot      

        O26.891    

                          OTH PREGNANCY RELATED CONDITIONS, FIRST               

      

 

                09/15/2016           ANGEL WASHINGTON MD           Ot      

        R10.2      

                          PELVIC AND PERINEAL PAIN                    

 

                09/15/2016           ANGEL WASHINGTON MD T           Ot      

        Z3A.10     

                          10 WEEKS GESTATION OF PREGNANCY                    

 

                2016           ANGEL WASHINGTON MD T           Ot      

        N89.8      

                          OTHER SPECIFIED NONINFLAMMATORY DISORDER              

      

 

                2016           ANGEL WASHINGOTN MD T           Ot      

        O23.591    

                          INFECTION OTH PRT GENITL TRCT IN PREGNAN              

      

 

                2016           ANGEL WASHINGTON MD           Ot      

        O26.891    

                          OTH PREGNANCY RELATED CONDITIONS, FIRST               

      

 

                2016           ANGEL WASHINGTON MD T           Ot      

        R10.2      

                          PELVIC AND PERINEAL PAIN                    

 

                2016           ANGEL WASHINGTON MD T           Ot      

        Z3A.10     

                          10 WEEKS GESTATION OF PREGNANCY                    

 

             2017           MARCELO OLIVEIRA MD           Ot           O26.

93           

PREGNANCY RELATED CONDITIONS, UNSPECIFIE                    

 

             2017           MARCELO OLIVEIRA MD           Ot           R10.

2           

PELVIC AND PERINEAL PAIN                         

 

             2017           MARCELO OLIVEIRA MD           Ot           Z3A.

30           

30 WEEKS GESTATION OF PREGNANCY                    

 

             2017           MARCELO OLIVEIRA MD           Ot           O26.

93           

PREGNANCY RELATED CONDITIONS, UNSPECIFIE                    

 

             2017           MARCELO OLIVEIRA MD           Ot           R10.

2           

PELVIC AND PERINEAL PAIN                         

 

             2017           MARCELO OLIVEIRA MD           Ot           Z3A.

30           

30 WEEKS GESTATION OF PREGNANCY                    

 

             2017           MARCELO OLIVEIRA MD           Ot           O47.

1           

FALSE LABOR AT OR AFTER 37 COMPLETED WEE                    

 

             2017           MARCELO OLIVEIRA MD           Ot           Z3A.

37           

37 WEEKS GESTATION OF PREGNANCY                    

 

             2017           MARCELO OLIVEIRA MD           Ot           O47.

1           

FALSE LABOR AT OR AFTER 37 COMPLETED WEE                    

 

             2017           MARCELO OLIVEIRA MD           Ot           Z3A.

37           

37 WEEKS GESTATION OF PREGNANCY                    

 

                2017           FENECH DO, KAY S           Ot          

    O47.1          

                          FALSE LABOR AT OR AFTER 37 COMPLETED WEE              

      

 

                2017           FENECH DO KAY S           Ot          

    Z3A.38         

                          38 WEEKS GESTATION OF PREGNANCY                    

 

                2017           FENECH DOKAY S           Ot          

    O47.1          

                          FALSE LABOR AT OR AFTER 37 COMPLETED WEE              

      

 

                2017           FENECH DOKAY S           Ot          

    Z3A.38         

                          38 WEEKS GESTATION OF PREGNANCY                    

 

             2017           MARCELO OLIVEIRA MD           Ot           D50.

9           

IRON DEFICIENCY ANEMIA, UNSPECIFIED                    

 

             2017           MARCELO OLIVEIRA MD           Ot           O48.

0           

POST-TERM PREGNANCY                              

 

                2017           MARCELO OLIVEIRA MD           Ot            

  O69.2XX0         

                          LABOR AND DEL COMP BY OTH CORD ENTANGLE,              

      

 

                2017           MARCELO OLIVEIRA MD           Ot            

  O69.81X0         

                          LABOR AND DEL COMP BY CORD AROUND NECK,               

      

 

                2017           MARCELO OLIVEIRA MD           Ot            

  O99.013          

                          ANEMIA COMPLICATING PREGNANCY, THIRD TRI              

      

 

             2017           MARCELO OLIVEIRA MD           Ot           Z37.

0           

SINGLE LIVE BIRTH                                

 

             2017           TEE SHEEHAN, MARCELO COSBY           Ot           Z3A.

40           

40 WEEKS GESTATION OF PREGNANCY                    

 

                2017           MARCELO OLIVEIRA MD           Ot            

  Z87.891          

                          PERSONAL HISTORY OF NICOTINE DEPENDENCE               

     

 

           2017                       Ot           787.02                 

           

   

 

           2017                       Ot           998.9                  

           

  

 

           2017                       Ot           E878.6                 

           

   

 

           2017                       Ot           V58.69                 

           

   

 

           2017                       Ot           787.02                 

           

   

 

           2017                       Ot           998.9                  

           

  

 

           2017                       Ot           E878.6                 

           

   

 

           2017                       Ot           V58.69                 

           

   

 

             2019           DEREK DOMINGO           Ot           F32.9   

        MAJOR

DEPRESSIVE DISORDER, SINGLE EPISOD                    

 

             2019           DEREK DOMINGO           Ot           F41.9   

        

ANXIETY DISORDER, UNSPECIFIED                    

 

             2019           THOMAS DOMINGOIS           Ot           S81.011A

           

LACERATION WITHOUT FOREIGN BODY, RIGHT K                    

 

             2019           DEREK DOMINGO           Ot           W25.XXXA

           

CONTACT WITH SHARP GLASS, INITIAL ENCOUN                    

 

             2019           DEREK DOMINGO           Ot           Z23     

      

ENCOUNTER FOR IMMUNIZATION                       

 

             2019           DEREK DOMINGO           Ot           Z80.0   

        

FAMILY HISTORY OF MALIGNANT NEOPLASM OF                     

 

             2019           DEREK DOMINGO           Ot           Z80.1   

        

FAMILY HISTORY OF MALIG NEOPLASM OF TRAC                    

 

             2019           DEREK DOMINGO           Ot           Z80.3   

        

FAMILY HISTORY OF MALIGNANT NEOPLASM OF                     

 

             2019           THOMAS DOMINGOIS           Ot           Z82.49  

         

FAMILY HX OF ISCHEM HEART DIS AND OTH DI                    

 

             2019           DEREK DOMINGO           Ot           Z86.19  

         

PERSONAL HISTORY OF OTHER INFECTIOUS AND                    

 

             2019           DEREK DOMINGO           Ot           Z87.448 

          

PERSONAL HISTORY OF OTHER DISEASES OF UR                    

 

             2019           DEREK DOMINGO           Ot           Z87.891 

          

PERSONAL HISTORY OF NICOTINE DEPENDENCE                    

 

             2019           DEREK DOMINGO           Ot           Z90.49  

         

ACQUIRED ABSENCE OF OTHER SPECIFIED PART                    

 

             2019           DEREK DOMINGO           Ot           Z90.89  

         

ACQUIRED ABSENCE OF OTHER ORGANS                    

 

             2019           DEREK DOMINGO           Ot           F32.9   

        MAJOR

DEPRESSIVE DISORDER, SINGLE EPISOD                    

 

             2019           THOMAS DOMINGOIS           Ot           F41.9   

        

ANXIETY DISORDER, UNSPECIFIED                    

 

             2019           DEREK DOMINGO           Ot           S81.011A

           

LACERATION WITHOUT FOREIGN BODY, RIGHT K                    

 

             2019           DEREK DOMINGO           Ot           W25.XXXA

           

CONTACT WITH SHARP GLASS, INITIAL ENCOUN                    

 

             2019           DEREK DOMINGO           Ot           Z23     

      

ENCOUNTER FOR IMMUNIZATION                       

 

             2019           CHAYO EDREK           Ot           Z80.0   

        

FAMILY HISTORY OF MALIGNANT NEOPLASM OF                     

 

             2019           CHAYO DEREK           Ot           Z80.1   

        

FAMILY HISTORY OF MALIG NEOPLASM OF TRAC                    

 

             2019           ROSALIOADRIANDEREK           Ot           Z80.3   

        

FAMILY HISTORY OF MALIGNANT NEOPLASM OF                     

 

             2019           DEREK DOMINGO           Ot           Z82.49  

         

FAMILY HX OF ISCHEM HEART DIS AND OTH DI                    

 

             2019           THOMAS DOMIGNOIS           Ot           Z86.19  

         

PERSONAL HISTORY OF OTHER INFECTIOUS AND                    

 

             2019           DEREK DOMINGO           Ot           Z87.448 

          

PERSONAL HISTORY OF OTHER DISEASES OF UR                    

 

             2019           ROSALIODEREK CAMPBELL           Ot           Z87.891 

          

PERSONAL HISTORY OF NICOTINE DEPENDENCE                    

 

             2019           DEREK DOMINGO           Ot           Z90.49  

         

ACQUIRED ABSENCE OF OTHER SPECIFIED PART                    

 

             2019           DEREK DOMINGO           Ot           Z90.89  

         

ACQUIRED ABSENCE OF OTHER ORGANS                    

 

           2020                       Ot           787.02                 

           

   

 

           2020                       Ot           998.9                  

           

  

 

           2020                       Ot           E878.6                 

           

   

 

           2020                       Ot           V58.69                 

           

   

 

                2020           JEREMI MARTIN MD           Ot        

      F32.9        

                          MAJOR DEPRESSIVE DISORDER, SINGLE EPISOD              

      

 

                2020           JEREMI MARTIN MD           Ot        

      F41.9        

                          ANXIETY DISORDER, UNSPECIFIED                    

 

                2020           JEREMI MARTIN MD           Ot        

      N30.01       

                          ACUTE CYSTITIS WITH HEMATURIA                    

 

                2020           JEREMI MARTIN MD           Ot        

      R06.02       

                          SHORTNESS OF BREATH                    

 

                2020           JEREMI MARTIN MD           Ot        

      F32.9        

                          MAJOR DEPRESSIVE DISORDER, SINGLE EPISOD              

      

 

                2020           JEREMI MARTIN MD           Ot        

      F41.9        

                          ANXIETY DISORDER, UNSPECIFIED                    

 

                2020           JEREMI MARTIN MD           Ot        

      N30.01       

                          ACUTE CYSTITIS WITH HEMATURIA                    

 

                2020           JEREMI MARTIN MD           Ot        

      R06.02       

                          SHORTNESS OF BREATH                    



                                                                                
                                                                                
                                                              



Procedures

      



                Code            Description           Performed By           Per

formed On        

 

                                      [id number]                                 DE

LIVER OF PRODUCTS OF 

CONCEPTION, EXTE                                               04/10/2017       

 



                  



Results

      



                    Test                Result              Range        

 

                                        Complete urinalysis with reflex to cultu

re - 08/15/16 20:24         

 

                    Urine color determination           YELLOW              NRG 

       

 

                    Urine clarity determination           CLEAR               NR

G        

 

                    Urine pH measurement by test strip           7              

     5-9        

 

                    Specific gravity of urine by test strip           1.005     

          1.016-1.022  

     

 

                    Urine protein assay by test strip, semi-quantitative        

   1+                  

NEGATIVE        

 

                    Urine glucose detection by automated test strip           NE

GATIVE            

NEGATIVE        

 

                          Erythrocytes detection in urine sediment by light micr

oscopy           5+       

                                        NEGATIVE        

 

                    Urine ketones detection by automated test strip           NE

GATIVE            

NEGATIVE        

 

                    Urine nitrite detection by test strip           NEGATIVE    

        NEGATIVE    

   

 

                    Urine total bilirubin detection by test strip           NEGA

TIVE            

NEGATIVE        

 

                          Urine urobilinogen measurement by automated test strip

 (mass/volume)           

NORMAL                                  NORMAL        

 

                    Urine leukocyte esterase detection by dipstick           3+ 

                 NEGATIVE 

      

 

                                        Automated urine sediment erythrocyte cou

nt by microscopy (number/high power 

field)                     [HPF]                    NRG        

 

                                        Automated urine sediment leukocyte count

 by microscopy (number/high power field)

                           [HPF]                    NRG        

 

                          Bacteria detection in urine sediment by light microsco

py           TRACE        

                                        NRG        

 

                                        Squamous epithelial cells detection in u

rine sediment by light microscopy       

                          0-2                       NRG        

 

                          Crystals detection in urine sediment by light microsco

py           NONE         

                                        NRG        

 

                    Casts detection in urine sediment by light microscopy       

    NONE                

NRG        

 

                          Mucus detection in urine sediment by light microscopy 

          NEGATIVE        

                                        NRG        

 

                    Complete urinalysis with reflex to culture           YES    

             NRG        

 

                                        Bacterial urine culture - 08/15/16 20:24

         

 

                    Bacterial urine culture           [id number]            NRG   

     

 

                    COLONY COUNT           >100,000/ML            NRG        

 

                    FTX;REPORTABLE           SENSITIVITY REPORTED 16 13:30 

           NRG      

  

 

                    URINE CULTURE RESULTS           <10,000/ML            NRG   

     

 

                                        Bacterial susceptibility panel - 08/15/1

6 20:24         

 

                          Gentamicin susceptibility test by minimum inhibitory c

oncentration           <= 

                                        NRG        

 

                                        Trimethoprim/sulfamethoxazole susceptibi

lity test by minimum 

inhibitoryconcentration           <=                        NRG        

 

                          Ampicillin susceptibility test by minimum inhibitory c

oncentration           <= 

                                        NRG        

 

                          Tobramycin susceptibility test by minimum inhibitory c

oncentration           <= 

                                        NRG        

 

                          Cefazolin susceptibility test by minimum inhibitory co

ncentration           <=  

                                        NRG        

 

                          Ceftriaxone susceptibility test by minimum inhibitory 

concentration           <=

                                        NRG        

 

                                        Ampicillin/sulbactam susceptibility test

 by minimum inhibitory concentration    

                          <=                        NRG        

 

                                        Piperacillin/tazobactam susceptibility t

est by minimum inhibitory concentration 

                          <=                        NRG        

 

                          Ciprofloxacin susceptibility test by minimum inhibitor

y concentration           

<=                                      NRG        

 

                          Meropenem susceptibility test by minimum inhibitory co

ncentration           0.5 

                                        NRG        

 

                                        Nitrofurantoin susceptibility test by mi

nimum inhibitory concentration          

                          128                       NRG        

 

                          Aztreonam susceptibility test by minimum inhibitory co

ncentration           <=  

                                        NRG        

 

                                        Complete urinalysis with reflex to cultu

re - 16 20:26         

 

                    Urine color determination           YELLOW              NRG 

       

 

                    Urine clarity determination           CLEAR               NR

G        

 

                    Urine pH measurement by test strip           7              

     5-9        

 

                    Specific gravity of urine by test strip           1.010     

          1.016-1.022  

      

 

                          Urine protein assay by test strip, semi-quantitative  

         NEGATIVE         

                                        NEGATIVE        

 

                    Urine glucose detection by automated test strip           NE

GATIVE            

NEGATIVE        

 

                          Erythrocytes detection in urine sediment by light micr

oscopy           NEGATIVE 

                                        NEGATIVE        

 

                    Urine ketones detection by automated test strip           NE

GATIVE            

NEGATIVE        

 

                    Urine nitrite detection by test strip           NEGATIVE    

        NEGATIVE    

    

 

                    Urine total bilirubin detection by test strip           NEGA

TIVE            

NEGATIVE        

 

                          Urine urobilinogen measurement by automated test strip

 (mass/volume)           

NORMAL                                  NORMAL        

 

                    Urine leukocyte esterase detection by dipstick           2+ 

                 NEGATIVE 

       

 

                                        Automated urine sediment erythrocyte cou

nt by microscopy (number/high power 

field)                    NONE                      NRG        

 

                                        Automated urine sediment leukocyte count

 by microscopy (number/high power field)

                           [HPF]                    NRG        

 

                          Bacteria detection in urine sediment by light microsco

py           FEW          

                                        NRG        

 

                                        Squamous epithelial cells detection in u

rine sediment by light microscopy       

                          2-5                       NRG        

 

                          Crystals detection in urine sediment by light microsco

py           NONE         

                                        NRG        

 

                    Casts detection in urine sediment by light microscopy       

    NONE                

NRG        

 

                          Mucus detection in urine sediment by light microscopy 

          NEGATIVE        

                                        NRG        

 

                    Complete urinalysis with reflex to culture           NO     

             NRG        

 

                                        Bacterial urine culture - 16 20:26

         

 

                    Bacterial urine culture           [id number]            NRG   

     

 

                    COLONY COUNT           >100,000/ML            NRG        

 

                                        Complete blood count (CBC) with automate

d white blood cell (WBC) differential - 

16 20:41         

 

                          Blood leukocytes automated count (number/volume)      

     11.8 10*3/uL         

                                        4.3-11.0        

 

                          Blood erythrocytes automated count (number/volume)    

       4.27 10*6/uL       

                                        4.35-5.85        

 

                    Venous blood hemoglobin measurement (mass/volume)           

12.4 g/dL           

11.5-16.0        

 

                    Blood hematocrit (volume fraction)           37 %           

     35-52        

 

                    Automated erythrocyte mean corpuscular volume           87 [

foz_us]           

80-99        

 

                                        Automated erythrocyte mean corpuscular h

emoglobin (mass per erythrocyte)        

                          29 pg                     25-34        

 

                                        Automated erythrocyte mean corpuscular h

emoglobin concentration measurement 

(mass/volume)             33 g/dL                   32-36        

 

                    Automated erythrocyte distribution width ratio           13.

5 %              10.0-

14.5        

 

                    Automated blood platelet count (count/volume)           188 

10*3/uL           

130-400        

 

                          Automated blood platelet mean volume measurement      

     11.2 [foz_us]        

                                        7.4-10.4        

 

                    Automated blood neutrophils/100 leukocytes           66 %   

             42-75       

 

 

                    Automated blood lymphocytes/100 leukocytes           22 %   

             12-44       

 

 

                    Blood monocytes/100 leukocytes           7 %                

 0-12        

 

                    Automated blood eosinophils/100 leukocytes           5 %    

             0-10        

 

                    Automated blood basophils/100 leukocytes           0 %      

           0-10        

 

                    Blood neutrophils automated count (number/volume)           

7.8 10*3            

1.8-7.8        

 

                    Blood lymphocytes automated count (number/volume)           

2.7 10*3            

1.0-4.0        

 

                    Blood monocytes automated count (number/volume)           0.

9 10*3            

0.0-1.0        

 

                    Automated eosinophil count           0.5 10*3/uL           0

.0-0.3        

 

                    Automated blood basophil count (count/volume)           0.0 

10*3/uL           

0.0-0.1        

 

                                        Serum or plasma choriogonadotropin measu

rement (units/volume) - 16 20:41  

       

 

                          Serum or plasma choriogonadotropin measurement (units/

volume)           [id number] 

m[iU]/mL                                <5        

 

                                        Bacteria identification in genital speci

men by aerobe culture - 16 21:00  

       

 

                    QUANTITY OF GROWTH           Moderate Growth            NRG 

       

 

                          Bacteria identification in genital specimen by aerobe 

culture           [id number]

                                        NRG        

 

                                        Microscopic examination by JYOTI preparati

on - 16 21:00         

 

                    Microscopic examination by KOH preparation           TNP    

             NRG        

 

                                        Microscopic examination by wet preparati

on - 16 21:00         

 

                    WET PREP RESULTS           9/15/16 14:45 ST            NRG  

      

 

                                        Chlamydia trachomatis DNA detection by p

robe and signal amplification method - 

16 21:00         

 

                                        Chlamydia trachomatis DNA detection by p

robe and target amplification method    

                          Negative                  Negative        

 

                                        Neisseria gonorrhoeae DNA detection by p

robe and signal amplification method - 

16 21:00         

 

                    Gonorrhea amp DNA-urine           Negative            Negati

ve        

 

                                        Complete urinalysis with reflex to cultu

re - 17 01:00         

 

                    Urine color determination           YELLOW              NRG 

       

 

                    Urine clarity determination           CLEAR               NR

G        

 

                    Urine pH measurement by test strip           6              

     5-9        

 

                    Specific gravity of urine by test strip           1.010     

          1.016-1.022  

      

 

                          Urine protein assay by test strip, semi-quantitative  

         NEGATIVE         

                                        NEGATIVE        

 

                    Urine glucose detection by automated test strip           NE

GATIVE            

NEGATIVE        

 

                          Erythrocytes detection in urine sediment by light micr

oscopy           NEGATIVE 

                                        NEGATIVE        

 

                    Urine ketones detection by automated test strip           NE

GATIVE            

NEGATIVE        

 

                    Urine nitrite detection by test strip           NEGATIVE    

        NEGATIVE    

    

 

                    Urine total bilirubin detection by test strip           NEGA

TIVE            

NEGATIVE        

 

                          Urine urobilinogen measurement by automated test strip

 (mass/volume)           

NORMAL                                  NORMAL        

 

                    Urine leukocyte esterase detection by dipstick           NEG

ATIVE            

NEGATIVE        

 

                                        Automated urine sediment erythrocyte cou

nt by microscopy (number/high power 

field)                    NONE                      NRG        

 

                                        Automated urine sediment leukocyte count

 by microscopy (number/high power field)

                          NONE                      NRG        

 

                          Bacteria detection in urine sediment by light microsco

py           NEGATIVE     

                                        NRG        

 

                                        Squamous epithelial cells detection in u

rine sediment by light microscopy       

                          2-5                       NRG        

 

                          Crystals detection in urine sediment by light microsco

py           NONE         

                                        NRG        

 

                    Casts detection in urine sediment by light microscopy       

    NONE                

NRG        

 

                          Mucus detection in urine sediment by light microscopy 

          NEGATIVE        

                                        NRG        

 

                    Complete urinalysis with reflex to culture           NO     

             NRG        

 

                                        Complete blood count (CBC) with automate

d white blood cell (WBC) differential - 

17 20:55         

 

                          Blood leukocytes automated count (number/volume)      

     12.3 10*3/uL         

                                        4.3-11.0        

 

                          Blood erythrocytes automated count (number/volume)    

       4.06 10*6/uL       

                                        4.35-5.85        

 

                    Venous blood hemoglobin measurement (mass/volume)           

10.7 g/dL           

11.5-16.0        

 

                    Blood hematocrit (volume fraction)           34 %           

     35-52        

 

                    Automated erythrocyte mean corpuscular volume           83 [

foz_us]           

80-99        

 

                                        Automated erythrocyte mean corpuscular h

emoglobin (mass per erythrocyte)        

                          26 pg                     25-34        

 

                                        Automated erythrocyte mean corpuscular h

emoglobin concentration measurement 

(mass/volume)             32 g/dL                   32-36        

 

                    Automated erythrocyte distribution width ratio           14.

0 %              10.0-

14.5        

 

                    Automated blood platelet count (count/volume)           209 

10*3/uL           

130-400        

 

                          Automated blood platelet mean volume measurement      

     12.3 [foz_us]        

                                        7.4-10.4        

 

                    Automated blood neutrophils/100 leukocytes           71 %   

             42-75       

 

 

                    Automated blood lymphocytes/100 leukocytes           18 %   

             12-44       

 

 

                    Blood monocytes/100 leukocytes           9 %                

 0-12        

 

                    Automated blood eosinophils/100 leukocytes           2 %    

             0-10        

 

                    Automated blood basophils/100 leukocytes           0 %      

           0-10        

 

                    Blood neutrophils automated count (number/volume)           

8.7 10*3            

1.8-7.8        

 

                    Blood lymphocytes automated count (number/volume)           

2.2 10*3            

1.0-4.0        

 

                    Blood monocytes automated count (number/volume)           1.

1 10*3            

0.0-1.0        

 

                    Automated eosinophil count           0.3 10*3/uL           0

.0-0.3        

 

                    Automated blood basophil count (count/volume)           0.0 

10*3/uL           

0.0-0.1        

 

                                        Blood type T Indirect antibody screen pa

[place] - 17 20:55         

 

                    ABO+Rh group           AP                  NRG        

 

                    Transfusion band number           [id number]             NRG   

     

 

                    Blood group antibody screen           NEGATIVE            NR

G        

 

                                        Complete blood count (CBC) with automate

d white blood cell (WBC) differential - 

17 07:55         

 

                          Blood leukocytes automated count (number/volume)      

     11.1 10*3/uL         

                                        4.3-11.0        

 

                          Blood erythrocytes automated count (number/volume)    

       3.62 10*6/uL       

                                        4.35-5.85        

 

                    Venous blood hemoglobin measurement (mass/volume)           

9.4 g/dL            

11.5-16.0        

 

                    Blood hematocrit (volume fraction)           31 %           

     35-52        

 

                    Automated erythrocyte mean corpuscular volume           85 [

foz_us]           

80-99        

 

                                        Automated erythrocyte mean corpuscular h

emoglobin (mass per erythrocyte)        

                          26 pg                     25-34        

 

                                        Automated erythrocyte mean corpuscular h

emoglobin concentration measurement 

(mass/volume)             31 g/dL                   32-36        

 

                    Automated erythrocyte distribution width ratio           14.

3 %              10.0-

14.5        

 

                    Automated blood platelet count (count/volume)           158 

10*3/uL           

130-400        

 

                          Automated blood platelet mean volume measurement      

     11.8 [foz_us]        

                                        7.4-10.4        

 

                    Automated blood neutrophils/100 leukocytes           67 %   

             42-75       

 

 

                    Automated blood lymphocytes/100 leukocytes           23 %   

             12-44       

 

 

                    Blood monocytes/100 leukocytes           8 %                

 0-12        

 

                    Automated blood eosinophils/100 leukocytes           2 %    

             0-10        

 

                    Automated blood basophils/100 leukocytes           0 %      

           0-10        

 

                    Blood neutrophils automated count (number/volume)           

7.4 10*3            

1.8-7.8        

 

                    Blood lymphocytes automated count (number/volume)           

2.5 10*3            

1.0-4.0        

 

                    Blood monocytes automated count (number/volume)           0.

9 10*3            

0.0-1.0        

 

                    Automated eosinophil count           0.2 10*3/uL           0

.0-0.3        

 

                    Automated blood basophil count (count/volume)           0.0 

10*3/uL           

0.0-0.1        

 

                                        CMP - 19 09:22         

 

                    GLUCOSE             83 mg/dL            65-99        

 

                    UREA NITROGEN (BUN)           10 mg/dL            7-25      

  

 

                    CREATININE           0.64 mg/dL           0.50-1.10        

 

                    eGFR NON-AFR. AMERICAN           121 mL/min/1.73m2          

 > OR = 60        

 

                    eGFR            140 mL/min/1.73m2           

> OR = 60        

 

                    BUN/CREATININE RATIO           NOT APPLICABLE (calc)        

   6-22        

 

                    SODIUM              140 mmol/L           135-146        

 

                    POTASSIUM           4.1 mmol/L           3.5-5.3        

 

                    CHLORIDE            106 mmol/L                   

 

                    CARBON DIOXIDE           26 mmol/L           20-32        

 

                    CALCIUM             9.0 mg/dL           8.6-10.2        

 

                    PROTEIN, TOTAL           6.8 g/dL            6.1-8.1        

 

                    ALBUMIN             4.3 g/dL            3.6-5.1        

 

                    GLOBULIN            2.5 g/dL (calc)           1.9-3.7       

 

 

                    ALBUMIN/GLOBULIN RATIO           1.7 (calc)           1.0-2.

5        

 

                    BILIRUBIN, TOTAL           0.5 mg/dL           0.2-1.2      

  

 

                    ALKALINE PHOSPHATASE           71 U/L                 

     

 

                    AST                 12 U/L              10-30        

 

                    ALT                 13 U/L              6-29        

 

                                        VITAMIN D, 25-H - 19 12:57        

 

 

                    VITAMIN D,25-OH,TOTAL,IA           38 ng/mL            30-10

0        

 

                                        VITAMIN B12 - 19 12:59         

 

                    VITAMIN B12           665 pg/mL           200-1100        

 

                                        Complete blood count (CBC) with automate

d white blood cell (WBC) differential - 

20 00:08         

 

                          Blood leukocytes automated count (number/volume)      

     10.8 10*3/uL         

                                        4.3-11.0        

 

                          Blood erythrocytes automated count (number/volume)    

       4.58 10*6/uL       

                                        4.35-5.85        

 

                    Venous blood hemoglobin measurement (mass/volume)           

12.9 g/dL           

11.5-16.0        

 

                    Blood hematocrit (volume fraction)           40 %           

     35-52        

 

                    Automated erythrocyte mean corpuscular volume           87 [

foz_us]           

80-99        

 

                                        Automated erythrocyte mean corpuscular h

emoglobin (mass per erythrocyte)        

                          28 pg                     25-34        

 

                                        Automated erythrocyte mean corpuscular h

emoglobin concentration measurement 

(mass/volume)             32 g/dL                   32-36        

 

                    Automated erythrocyte distribution width ratio           14.

9 %              10.0-

14.5        

 

                    Automated blood platelet count (count/volume)           216 

10*3/uL           

130-400        

 

                          Automated blood platelet mean volume measurement      

     11.7 [foz_us]        

                                        7.4-10.4        

 

                    Automated blood neutrophils/100 leukocytes           60 %   

             42-75       

 

 

                    Automated blood lymphocytes/100 leukocytes           31 %   

             12-44       

 

 

                    Blood monocytes/100 leukocytes           6 %                

 0-12        

 

                    Automated blood eosinophils/100 leukocytes           3 %    

             0-10        

 

                    Automated blood basophils/100 leukocytes           0 %      

           0-10        

 

                    Blood neutrophils automated count (number/volume)           

6.5 10*3            

1.8-7.8        

 

                    Blood lymphocytes automated count (number/volume)           

3.4 10*3            

1.0-4.0        

 

                    Blood monocytes automated count (number/volume)           0.

6 10*3            

0.0-1.0        

 

                    Automated eosinophil count           0.3 10*3/uL           0

.0-0.3        

 

                    Automated blood basophil count (count/volume)           0.0 

10*3/uL           

0.0-0.1        

 

                                        Complete urinalysis with reflex to cultu

re - 20 00:08         

 

                    Urine color determination           DARK YELLOW            N

RG        

 

                    Urine clarity determination           SL CLOUDY            N

RG        

 

                    Urine pH measurement by test strip           6.5            

     5-9        

 

                    Specific gravity of urine by test strip           1.025     

          1.016-1.022  

      

 

                          Urine protein assay by test strip, semi-quantitative  

         NEGATIVE         

                                        NEGATIVE        

 

                    Urine glucose detection by automated test strip           NE

GATIVE            

NEGATIVE        

 

                          Erythrocytes detection in urine sediment by light micr

oscopy           NEGATIVE 

                                        NEGATIVE        

 

                    Urine ketones detection by automated test strip           NE

GATIVE            

NEGATIVE        

 

                    Urine nitrite detection by test strip           POSITIVE    

        NEGATIVE    

    

 

                    Urine total bilirubin detection by test strip           NEGA

TIVE            

NEGATIVE        

 

                          Urine urobilinogen measurement by automated test strip

 (mass/volume)           

1.0 mg/dL                               < = 1.0        

 

                    Urine leukocyte esterase detection by dipstick           NEG

ATIVE            

NEGATIVE        

 

                                        Automated urine sediment erythrocyte cou

nt by microscopy (number/high power 

field)                     [HPF]                    NRG        

 

                                        Automated urine sediment leukocyte count

 by microscopy (number/high power field)

                           [HPF]                    NRG        

 

                          Bacteria detection in urine sediment by light microsco

py           LARGE        

                                        NRG        

 

                                        Squamous epithelial cells detection in u

rine sediment by light microscopy       

                          0-2                       NRG        

 

                          Crystals detection in urine sediment by light microsco

py           NONE         

                                        NRG        

 

                    Casts detection in urine sediment by light microscopy       

    NONE                

NRG        

 

                          Mucus detection in urine sediment by light microscopy 

          NEGATIVE        

                                        NRG        

 

                    Complete urinalysis with reflex to culture           YES    

             NRG        

 

                                        Fibrin D-dimer FEU measurement in platel

et poor plasma (mass/volume) - 20 

00:08         

 

                          Fibrin D-dimer FEU measurement in platelet poor plasma

 (mass/volume)           

0.31 ug/mL                              0.00-0.49        

 

                                        Comprehensive metabolic panel - 20

 00:08         

 

                          Serum or plasma sodium measurement (moles/volume)     

      140 mmol/L          

                                        135-145        

 

                          Serum or plasma potassium measurement (moles/volume)  

         3.8 mmol/L       

                                        3.6-5.0        

 

                          Serum or plasma chloride measurement (moles/volume)   

        108 mmol/L        

                                                

 

                    Carbon dioxide           21 mmol/L           21-32        

 

                          Serum or plasma anion gap determination (moles/volume)

           11 mmol/L      

                                        5-14        

 

                          Serum or plasma urea nitrogen measurement (mass/volume

)           11 mg/dL      

                                        7-18        

 

                          Serum or plasma creatinine measurement (mass/volume)  

         0.83 mg/dL       

                                        0.60-1.30        

 

                    Serum or plasma urea nitrogen/creatinine mass ratio         

  13                  NRG 

       

 

                                        Serum or plasma creatinine measurement w

ith calculation of estimated glomerular 

filtration rate           >                         NRG        

 

                    Serum or plasma glucose measurement (mass/volume)           

93 mg/dL            

        

 

                    Serum or plasma calcium measurement (mass/volume)           

8.8 mg/dL           

8.5-10.1        

 

                          Serum or plasma total bilirubin measurement (mass/volu

me)           0.4 mg/dL   

                                        0.1-1.0        

 

                                        Serum or plasma alkaline phosphatase david

surement (enzymatic activity/volume)    

                          66 U/L                            

 

                                        Serum or plasma aspartate aminotransfera

se measurement (enzymatic 

activity/volume)           13 U/L                    5-34        

 

                                        Serum or plasma alanine aminotransferase

 measurement (enzymatic activity/volume)

                          16 U/L                    0-55        

 

                    Serum or plasma protein measurement (mass/volume)           

7.2 g/dL            

6.4-8.2        

 

                    Serum or plasma albumin measurement (mass/volume)           

4.3 g/dL            

3.2-4.5        

 

                    CALCIUM CORRECTED           8.6 mg/dL           8.5-10.1    

    

 

                                        Magnesium - 20 00:08         

 

                    Magnesium           2.0 mg/dL           1.6-2.4        

 

                                        Serum ragweed IgE antibody assay -  00:08         

 

                    Serum ragweed IgE antibody assay           121 U/L          

   125-220        

 

                                        Serum or plasma C reactive protein measu

rement (mass/volume) - 20 00:08   

      

 

                          Serum or plasma C reactive protein measurement (mass/v

olume)           2.84 

mg/dL                                   0.00-0.50        

 

                                        Erythrocyte sedimentation rate by nelly

gren method - 20 00:08         

 

                    Erythrocyte sedimentation rate by westergren method         

  6 mm                0-

20        

 

                                        Bacterial urine culture - 20 00:08

         

 

                    Bacterial urine culture           [id number]            NRG  

      

 

                    COLONY COUNT           >100,000/ML            NRG        

 

                    SUSCEPTIBILITY           SUSCEPTIBILITY REPORTED 20 10:3

5            NRG    

    

 

                    RAPID ID            PRELIM RAPID ID BY [facility] 20, 1215.    

        NRG        

 

                    ID CONFIRMATION           ID CONFIRMED [id number]          

  NRG        

 

                                        Dirithromycin susceptibility test by dis

k diffusion - 20 00:08         

 

                          Gentamicin susceptibility test by minimum inhibitory c

oncentration           <= 

                                        NRG        

 

                                        Trimethoprim/sulfamethoxazole susceptibi

lity test by minimum 

inhibitoryconcentration           <=                        NRG        

 

                          Levofloxacin susceptibility test by minimum inhibitory

 concentration           

<=                                      NRG        

 

                          Ampicillin susceptibility test by minimum inhibitory c

oncentration           S  

                                        NRG        

 

                          Cefazolin susceptibility test by minimum inhibitory co

ncentration           <=  

                                        NRG        

 

                          Ceftriaxone susceptibility test by minimum inhibitory 

concentration           <=

                                        NRG        

 

                          Ciprofloxacin susceptibility test by minimum inhibitor

y concentration           

<=                                      NRG        

 

                          Meropenem susceptibility test by minimum inhibitory co

ncentration           <=  

                                        NRG        

 

                                        Nitrofurantoin susceptibility test by mi

nimum inhibitory concentration          

                          <=                        NRG        

 

                    Amoxicillin and clavulanate potassium susc RICK           <= 

                 NRG      

  

 

                                        Streptococcus pyogenes antigen detection

 - 20 00:12         

 

                    Streptococcus pyogenes antigen detection           NEGATIVE 

           NEGATIVE 

       

 

                                        Influenza virus A and B antigen detectio

n - 20 00:12         

 

                    FLU RESULT           NEGATIVE FOR INFLUENZA A AND B ANTIGENS

 BY IA            

NRG        

 

                                        Bacterial throat culture - 20 00:1

2         

 

                    Bacterial throat culture           [id number]            NRG  

      

 

                    FREE TEXT EXTERNAL           PLUS NORMAL KRISTINA            NR

G        

 

                    QUANTITY OF GROWTH           Isolated            NRG        



                                                                              



Encounters

      



                ACCT No.           Visit Date/Time           Discharge          

 Status         

             Pt. Type           Provider           Facility           Loc./Unit 

          

Complaint        

 

                [id number]           2019 15:35:00           2019 23:59:5

9           [facility] 

                Outpatient           ABRIL ALVARADO [facility] 

[facility]                                     

 

             [id number]           2019 12:20:00                              

       Document

 Registration                                                                   

 

 

             [id number]           2019 09:20:00                              

       Document

 Registration                                                                   

 

 

                    [id number]           2020 23:48:00           

020 01:11:00        

                DIS             Emergency           VERONICA SHEEHAN, JEREMI Perez 

[facility]           ER                        SOB/HOT,COLD   

     

 

                    [id number]           2019 12:09:00           

019 13:30:00        

                DIS             Emergency           DEREK DOMINGO           [address]

 [facility]           ER                        R KNEE LAC - DIZZY     

   

 

                    [id number]           2017 20:15:00           

017 11:50:00        

                DIS             Inpatient           TEE SHEEHAN, MARCELO COSBY          

 [facility]           LDRP                      INDUCTION        

 

                    [id number]           2017 00:43:00           

017 03:50:00        

                DIS             Outpatient           KAY NEWMAN DO S       

    [facility]           [facility]                       CONTRACTIONS,DISCHARGE

        

 

                    [id number]           2017 16:07:00           

017 18:15:00        

                DIS             Outpatient           MARCELO OLIVEIRA MD         

  [facility]           [facility]                       PRESSURE, BACK PAIN    

    

 

                    [id number]           2017 16:06:00           

017 19:30:00        

                DIS             Outpatient           TEE SHEEHAN, MARCELO COSBY         

  [facility]           [facility]                       ABD CRAMPING        

 

                    [id number]           2016 19:38:00           

016 22:35:00        

                DIS             Emergency           FELIX SHEEHAN, ANGEL ARELLANO    

       [facility] 

[facility]           ER                        CRAMPING;10 WEE

KS PREGNANT  

      

 

                    [id number]           2016 19:08:00           

016 20:16:00        

                DIS             Emergency           EVAN ZACARIAS         

  [facility]           ER                        8 WKS PREG/FEVER/SINUS 

ISSUES/DIZZINESS        

 

                    [id number]           08/15/2016 20:19:00           08/15/2

016 21:22:00        

                DIS             Emergency           ANGEL WASHINGTON MD    

       [facility] 

[facility]           ER                        7 WEEKS PREG/LO

W BACK PAIN  

      

 

                    [id number]           2016 13:58:00           

016 15:25:00        

                DIS             Emergency           EVAN ZACARIAS         

  [facility]           ER                        IUD MOVED/POSS PREG    

    

 

             [id number]           04/15/2020 20:54:00                        A

CT           

Emergency                 EVAN ZACARIAS           [facility]                 ER                        SOB/LIGHTHEADEDNESS        

 

             [id number]           2008 17:30:00                         

            

Document Registration

## 2020-04-27 ENCOUNTER — HOSPITAL ENCOUNTER (OUTPATIENT)
Dept: HOSPITAL 75 - CARD | Age: 30
LOS: 90 days | Discharge: HOME | End: 2020-07-26
Attending: INTERNAL MEDICINE
Payer: COMMERCIAL

## 2020-04-27 VITALS — SYSTOLIC BLOOD PRESSURE: 110 MMHG | DIASTOLIC BLOOD PRESSURE: 62 MMHG

## 2020-04-27 VITALS — SYSTOLIC BLOOD PRESSURE: 101 MMHG | DIASTOLIC BLOOD PRESSURE: 71 MMHG

## 2020-04-27 VITALS — DIASTOLIC BLOOD PRESSURE: 74 MMHG | SYSTOLIC BLOOD PRESSURE: 107 MMHG

## 2020-04-27 VITALS — SYSTOLIC BLOOD PRESSURE: 100 MMHG | DIASTOLIC BLOOD PRESSURE: 71 MMHG

## 2020-04-27 VITALS — SYSTOLIC BLOOD PRESSURE: 103 MMHG | DIASTOLIC BLOOD PRESSURE: 52 MMHG

## 2020-04-27 VITALS — SYSTOLIC BLOOD PRESSURE: 103 MMHG | DIASTOLIC BLOOD PRESSURE: 76 MMHG

## 2020-04-27 VITALS — DIASTOLIC BLOOD PRESSURE: 77 MMHG | SYSTOLIC BLOOD PRESSURE: 103 MMHG

## 2020-04-27 VITALS — DIASTOLIC BLOOD PRESSURE: 62 MMHG | SYSTOLIC BLOOD PRESSURE: 113 MMHG

## 2020-04-27 VITALS — SYSTOLIC BLOOD PRESSURE: 113 MMHG | DIASTOLIC BLOOD PRESSURE: 71 MMHG

## 2020-04-27 VITALS — DIASTOLIC BLOOD PRESSURE: 81 MMHG | SYSTOLIC BLOOD PRESSURE: 115 MMHG

## 2020-04-27 VITALS — DIASTOLIC BLOOD PRESSURE: 73 MMHG | SYSTOLIC BLOOD PRESSURE: 116 MMHG

## 2020-04-27 VITALS — SYSTOLIC BLOOD PRESSURE: 110 MMHG | DIASTOLIC BLOOD PRESSURE: 72 MMHG

## 2020-04-27 VITALS — SYSTOLIC BLOOD PRESSURE: 106 MMHG | DIASTOLIC BLOOD PRESSURE: 71 MMHG

## 2020-04-27 VITALS — SYSTOLIC BLOOD PRESSURE: 110 MMHG | DIASTOLIC BLOOD PRESSURE: 74 MMHG

## 2020-04-27 VITALS — DIASTOLIC BLOOD PRESSURE: 71 MMHG | SYSTOLIC BLOOD PRESSURE: 113 MMHG

## 2020-04-27 VITALS — DIASTOLIC BLOOD PRESSURE: 54 MMHG | SYSTOLIC BLOOD PRESSURE: 113 MMHG

## 2020-04-27 VITALS — SYSTOLIC BLOOD PRESSURE: 100 MMHG | DIASTOLIC BLOOD PRESSURE: 64 MMHG

## 2020-04-27 VITALS — DIASTOLIC BLOOD PRESSURE: 72 MMHG | SYSTOLIC BLOOD PRESSURE: 107 MMHG

## 2020-04-27 VITALS — SYSTOLIC BLOOD PRESSURE: 112 MMHG | DIASTOLIC BLOOD PRESSURE: 68 MMHG

## 2020-04-27 VITALS — DIASTOLIC BLOOD PRESSURE: 64 MMHG | SYSTOLIC BLOOD PRESSURE: 108 MMHG

## 2020-04-27 VITALS — DIASTOLIC BLOOD PRESSURE: 67 MMHG | SYSTOLIC BLOOD PRESSURE: 116 MMHG

## 2020-04-27 VITALS — DIASTOLIC BLOOD PRESSURE: 68 MMHG | SYSTOLIC BLOOD PRESSURE: 111 MMHG

## 2020-04-27 VITALS — SYSTOLIC BLOOD PRESSURE: 116 MMHG | DIASTOLIC BLOOD PRESSURE: 56 MMHG

## 2020-04-27 VITALS — DIASTOLIC BLOOD PRESSURE: 77 MMHG | SYSTOLIC BLOOD PRESSURE: 99 MMHG

## 2020-04-27 VITALS — DIASTOLIC BLOOD PRESSURE: 61 MMHG | SYSTOLIC BLOOD PRESSURE: 90 MMHG

## 2020-04-27 VITALS — DIASTOLIC BLOOD PRESSURE: 61 MMHG | SYSTOLIC BLOOD PRESSURE: 112 MMHG

## 2020-04-27 DIAGNOSIS — R06.02: Primary | ICD-10-CM

## 2020-04-27 DIAGNOSIS — Z72.0: ICD-10-CM

## 2020-04-27 DIAGNOSIS — R55: ICD-10-CM

## 2020-04-27 PROCEDURE — 93270 REMOTE 30 DAY ECG REV/REPORT: CPT

## 2020-04-27 PROCEDURE — 93306 TTE W/DOPPLER COMPLETE: CPT

## 2020-04-27 PROCEDURE — 93660 TILT TABLE EVALUATION: CPT

## 2020-05-01 ENCOUNTER — HOSPITAL ENCOUNTER (EMERGENCY)
Dept: HOSPITAL 75 - ER | Age: 30
LOS: 1 days | Discharge: HOME | End: 2020-05-02
Payer: COMMERCIAL

## 2020-05-01 VITALS — BODY MASS INDEX: 30.84 KG/M2 | WEIGHT: 189.6 LBS | HEIGHT: 65.75 IN

## 2020-05-01 DIAGNOSIS — Z82.49: ICD-10-CM

## 2020-05-01 DIAGNOSIS — M54.6: ICD-10-CM

## 2020-05-01 DIAGNOSIS — Z80.3: ICD-10-CM

## 2020-05-01 DIAGNOSIS — Z80.0: ICD-10-CM

## 2020-05-01 DIAGNOSIS — Z80.1: ICD-10-CM

## 2020-05-01 DIAGNOSIS — R07.89: Primary | ICD-10-CM

## 2020-05-01 DIAGNOSIS — F17.210: ICD-10-CM

## 2020-05-01 LAB
ALBUMIN SERPL-MCNC: 4.4 GM/DL (ref 3.2–4.5)
ALP SERPL-CCNC: 67 U/L (ref 40–136)
ALT SERPL-CCNC: 16 U/L (ref 0–55)
APTT BLD: 35 SEC (ref 24–35)
BASOPHILS # BLD AUTO: 0 10^3/UL (ref 0–0.1)
BASOPHILS NFR BLD AUTO: 0 % (ref 0–10)
BILIRUB SERPL-MCNC: 0.3 MG/DL (ref 0.1–1)
BUN/CREAT SERPL: 11
CALCIUM SERPL-MCNC: 9.6 MG/DL (ref 8.5–10.1)
CHLORIDE SERPL-SCNC: 105 MMOL/L (ref 98–107)
CO2 SERPL-SCNC: 21 MMOL/L (ref 21–32)
CREAT SERPL-MCNC: 0.88 MG/DL (ref 0.6–1.3)
EOSINOPHIL # BLD AUTO: 0.4 10^3/UL (ref 0–0.3)
EOSINOPHIL NFR BLD AUTO: 3 % (ref 0–10)
ERYTHROCYTE [DISTWIDTH] IN BLOOD BY AUTOMATED COUNT: 15.3 % (ref 10–14.5)
GFR SERPLBLD BASED ON 1.73 SQ M-ARVRAT: > 60 ML/MIN
GLUCOSE SERPL-MCNC: 119 MG/DL (ref 70–105)
HCT VFR BLD CALC: 43 % (ref 35–52)
HGB BLD-MCNC: 13.9 G/DL (ref 11.5–16)
INR PPP: 1.1 (ref 0.8–1.4)
LYMPHOCYTES # BLD AUTO: 3.5 X 10^3 (ref 1–4)
LYMPHOCYTES NFR BLD AUTO: 30 % (ref 12–44)
MAGNESIUM SERPL-MCNC: 1.9 MG/DL (ref 1.6–2.4)
MANUAL DIFFERENTIAL PERFORMED BLD QL: NO
MCH RBC QN AUTO: 28 PG (ref 25–34)
MCHC RBC AUTO-ENTMCNC: 33 G/DL (ref 32–36)
MCV RBC AUTO: 86 FL (ref 80–99)
MONOCYTES # BLD AUTO: 0.7 X 10^3 (ref 0–1)
MONOCYTES NFR BLD AUTO: 6 % (ref 0–12)
NEUTROPHILS # BLD AUTO: 7.1 X 10^3 (ref 1.8–7.8)
NEUTROPHILS NFR BLD AUTO: 61 % (ref 42–75)
PLATELET # BLD: 234 10^3/UL (ref 130–400)
PMV BLD AUTO: 11.7 FL (ref 7.4–10.4)
POTASSIUM SERPL-SCNC: 3.7 MMOL/L (ref 3.6–5)
PROT SERPL-MCNC: 7.6 GM/DL (ref 6.4–8.2)
PROTHROMBIN TIME: 14.2 SEC (ref 12.2–14.7)
SODIUM SERPL-SCNC: 139 MMOL/L (ref 135–145)
WBC # BLD AUTO: 11.6 10^3/UL (ref 4.3–11)

## 2020-05-01 PROCEDURE — 84484 ASSAY OF TROPONIN QUANT: CPT

## 2020-05-01 PROCEDURE — 93041 RHYTHM ECG TRACING: CPT

## 2020-05-01 PROCEDURE — 85025 COMPLETE CBC W/AUTO DIFF WBC: CPT

## 2020-05-01 PROCEDURE — 86141 C-REACTIVE PROTEIN HS: CPT

## 2020-05-01 PROCEDURE — 36415 COLL VENOUS BLD VENIPUNCTURE: CPT

## 2020-05-01 PROCEDURE — 93005 ELECTROCARDIOGRAM TRACING: CPT

## 2020-05-01 PROCEDURE — 80053 COMPREHEN METABOLIC PANEL: CPT

## 2020-05-01 PROCEDURE — 71045 X-RAY EXAM CHEST 1 VIEW: CPT

## 2020-05-01 PROCEDURE — 85730 THROMBOPLASTIN TIME PARTIAL: CPT

## 2020-05-01 PROCEDURE — 85610 PROTHROMBIN TIME: CPT

## 2020-05-01 PROCEDURE — 83735 ASSAY OF MAGNESIUM: CPT

## 2020-05-01 PROCEDURE — 83874 ASSAY OF MYOGLOBIN: CPT

## 2020-05-01 NOTE — XMS REPORT
Patient Summarization Differential

                             Created on: 2020



FARHAD PEREZ

External Reference #: [id number]

: 1990

Sex: Female



Demographics





                          Address                   [address] Phone                [phone]

 

                          Preferred Language        English

 

                          Marital Status            Unknown

 

                          Taoism Affiliation     Unknown

 

                          Race                      White

 

                          Ethnic Group              Unknown





Author





                          Author                    [employer]

 

                          Organization              [employer]

 

                          Address                   [address]  [address]



 

                          Phone                     [phone]







Care Team Providers





                    Care Team Member Name Role                Phone

 

                    MARCELO GARCIA     Unavailable         [phone]

 

                    NO, LOCAL PHYSICIAN Unavailable         Unavailable

 

                    MARCELO GARCIA MD  Unavailable         Unavailable

 

                    FELIX SHEEHAN, ANGEL ARELLANO Unavailable         Unavailable

 

                    EVAN ZACARIAS Unavailable         Unavailable

 

                    FENKAY CHAVEZ DO S Unavailable         Unavailable

 

                    PCP, NONE           Unavailable         Unavailable

 

                    BERNDEREK CAMPBELL      Unavailable         Unavailable

 

                    FELIX SHEEHAN, ANGEL T Unavailable         Unavailable

 

                    BERNOT ARNP, DEREK Unavailable         Unavailable

 

                    EVAN ZACARIAS Unavailable         Unavailable

 

                    MARCELO GARCIA MD  Unavailable         Unavailable

 

                    MARCELO GARCIA MD  Unavailable         Unavailable

 

                    ABRIL ALVARADO      Unavailable         Unavailable

 

                    FENSCOTT FARAH, KAY VALLECILLO Unavailable         Unavailable

 

                    YASIR ESTRADA MD Unavailable         Unavailable

 

                    VERONICA SHEEHAN, JEREMI CHING Unavailable         Unavailable



                                            



Allergies

                      



      



           Normalized  Allergy   Reported  Date of   Reaction(s)  Care Provider 

 Facility



                 Allergy Type    classification  allergen        Allergy Onset  

 

 

      



            DA (21     Unclassified  No Known Drug  2008 -  no information

  YASIR ESTRADA                                  Not Available



                 sources.)       Allergies       , MD            (69938)

 

      



            no information  Unclassified  NO KNOWN DRUG   NO KNOWN DRUG  SHAD IRGGINS  Not 

Available



              (1 source.)   ALLERGIES    ALLERGIES    SARTHAK     (19887)



                                                                                
       



Medications

                      



        



         Medication  Ingredient  Drug    Dose    Dates   Status  Sig     Sig    

 Care



                 Class(es)       (Normalized)    (Original)      Provid



                                         er

 

        



         no      FENTANYL  no      20  no      no      no      no



         information    information   17 -    informat  information  info

rmation  

name



              (1 source.)  MCG/2CC      20     ion          (no



                     VIAL                17                  phone)

 

        



         no      Lactated  no      20  no      no      no      no



         information  Ringer's  information   17 -    informat  information  inf

ormation  

name



              (1 source.)  Solution     20     ion          (no



                           17                        phone)

 

        



         no      oxyCODONE  Opioid   20  no      no      no      no



           information   Agonist   17 -      informat  information  information 

 name



                 (1 source.)     05-30-20        ion             (no



                           17                        phone)



                                                                                
                 



Problems

                      Active Problems            

            



      



           Problem   Normalized  Date of   Normalized  Normalized  Provider  Fac

ility



              Classification  Problem(s)   Problem      Problem      Problem Sta

tus  



                           Onset/Resoluti            Duration   



                                         on    

 

      



           Residual  10 weeks  2020 -  Episodic  Active    ANGEL    [facility]

ia



                 codes;          gestation of     [facility]



                 unclassified    pregnancy       MD              Hospital -



                           (5 sources.)              [place]



                                         ()

 

      



           Residual  30 weeks  2020 -  Episodic  Active    MARCELO TEE , 

 [facility]



                 codes;          gestation of     [facility]



                     unclassified        pregnancy           Hospital -



                           (6 sources.)              [place]



                                         ()

 

      



           Residual  37 weeks  2020 -  Episodic  Active    MARCELO TEE , 

 [facility]



                 codes;          gestation of     [facility]



                     unclassified        pregnancy           Hospital -



                           (5 sources.)              [place]



                                         ()

 

      



           Residual  38 weeks  2020 -  Episodic  Active    KAY NEWMAN 

 [facility]



                 codes;          gestation of     , DO            [facility]



                     unclassified        pregnancy           Hospital -



                           (7 sources.)              [place]



                                         ()

 

      



           Residual  40 weeks  2020 -  Episodic  Active    MARCELO TEE , 

 [facility]



                 codes;          gestation of     [facility]



                     [facility]           [facility] -



                           (5 sources.)              [place]



                                         ()

 

      



           Residual  9 weeks   2020 -  Episodic  Active    EVAN ZACARIAS  [facility]

[facility]



                     codes;              gestation of        [facility]



                     unclassified        pregnancy           Hospital -



                           (5 sources.)              [place]



                                         ()

 

      



           Residual  Acquired  2020 -  Episodic  Active    DEREK BERNOT  

[facility]



                     codes;              absence of          [facility]



                     unclassified        other organs        Hospital -



                           (9 sources.)              [place]



                                         ()

 

      



           Residual  Acquired  2020 -  Episodic  Active    DEREK BERNOT  

[facility]



                     codes;              absence of          [facility]



                     unclassified        other               Hospital -



                     (9 sources.)        specified           [place]



                           parts of                  ()



                                         digestive     



                                         tract     

 

      



           Other     Anemia    2020 -  Chronic   Active    MARCELO TEE , 

 [facility]



                 complications   complicating     [facility]



                     of pregnancy        pregnancy,          Hospital -



                     (9 sources.)        third               [place]



                           trimester                 ()

 

      



           Anxiety   Anxiety   2020 -  Chronic   Active    DEREK BERNOT  

[facility]



                     disorders (10       disorder,           [facility]



                     sources.)           unspecified         Hospital -



                                         [place]



                                         ()

 

      



           External cause  Contact with  2020 -  Episodic  Active    TRAVI

S BERNOT  

[facility]



                     codes:              sharp glass,        [facility]



                     Cut/nguyen (6       initial             Hospital -



                     sources.)           encounter           [place]



                                         (80063)

 

      



           Other     Diseases of  2020 -  Episodic  Active    EVAN ZACARIAS 

 [facility]



                     complications       the                 [facility]



                     of pregnancy        respiratory         Hospital -



                     (9 sources.)        system              [place]



                           complicating              (28138)



                                         pregnancy,     



                                         first     



                                         trimester     

 

      



           Conditions  Dizziness and  2020 -  Episodic  Active    PETER BA

JENNIFER  [facility]



                     associated          giddiness           [facility]



                           with dizziness            Hospital -



                           or vertigo (9             [place]



                           sources.)                 ([address]756)

 

      



           Genitourinary  Dysuria   2020 -  Episodic  Active    ANGEL    

[facility]



                 symptoms and    Translations:     [facility]



                 ill-defined     [ PERSONAL      MD              Hospital -



                     conditions (18      HISTORY OF          [place]



                     sources.)           OTHER DISEASES      (36743)



                                         OF UR]     

 

      



           Immunizations  Encounter for  2020 -  Episodic  Active    TRAVI

S BERNOT  

[facility]



                     and screening       immunization        [facility]



                           for infectious            Hospital -



                           disease (9                [place]



                           sources.)                 (57919)

 

      



           Early or  False labor at  2020 -  Episodic  Active    MARCELO MCNU

LTY ,  [facility]



                 threatened      or after 37     [facility]



                     labor (17           completed           Hospital -



                     sources.)           weeks of            [place]



                           gestation                 (05405)

 

      



           Residual  Family history  2020 -  Episodic  Active    DEREK BE

RNOT  [facility]



                     codes;              of ischemic         [facility]



                     unclassified        heart disease       Hospital -



                     (9 sources.)        and other           [place]



                           diseases of               (66190)



                                         the     



                                         circulatory     



                                         system     

 

      



           Residual  Family history  2020 -  Episodic  Active    DEREK BE

RNOT  [facility]



                     codes;              of malignant        [facility]



                     unclassified        neoplasm of         Hospital -



                     (9 sources.)        breast              [place]



                                         (15292)

 

      



           Residual  Family history  2020 -  Episodic  Active    DEREK BE

RNOT  [facility]



                     codes;              of malignant        [facility]



                     unclassified        neoplasm of         Hospital -



                     (9 sources.)        digestive           [place]



                           organs                    (11677)

 

      



           Residual  Family history  2020 -  Episodic  Active    DEREK BE

RNOT  [facility]



                     codes;              of malignant        [facility]



                     unclassified        neoplasm of         Hospital -



                     (9 sources.)        trachea,            [place]



                           bronchus and              (43833)



                                         lung     

 

      



           Other     Infection of  2020 -  Episodic  Active    ANGEL    V

[facility]



                 complications   other part of     [facility]



                 of pregnancy    genital tract     [facility] -



                     (9 sources.)        in pregnancy,       [place]



                           first                     (10223)



                                         trimester     

 

      



           Deficiency and  Iron      2020 -  Episodic  Active    MARCELO MCNU

LTY ,  [facility]



                 other anemia    deficiency      [facility]



                     (9 sources.)        anemia,             Hospital -



                           unspecified               [place]



                                         (59820)

 

      



           Umbilical cord  Labor and  2020 -  Episodic  Active    MARCELO ROSS ,  [facility]



                 complication    delivery        [facility]



                     (15 sources.)       complicated by      Hospital -



                           cord around               [place]



                           neck, without             (83509)



                                         compression,     



                                         not applicable     



                                         or unspecified     



                                         Translations:     



                                         [ LABOR AND     



                                         DEL COMP BY     



                                         OT CORD     



                                         ENTANGLE,,     



                                         LABOR AND DEL     



                                         COMP BY OT     



                                         CORD     



                                         ENTANGLE,]     

 

      



           Open wounds of  Laceration  2020 -  Episodic  Active    DEREK 

BERNOT  [facility]



                     extremities         without             [facility]



                     (18 sources.)       foreign body,       Hospital -



                           right knee,               [place]



                           initial                   (44318)



                                         encounter     

 

      



           Residual  Less than 8  2020 -  Episodic  Active    ANGEL    [facility]

H [facility]



                 codes;          weeks           [facility]



                 unclassified    gestation of     [facility] -



                     (6 sources.)        pregnancy           [place]



                                         ()

 

      



           Mood disorders  Major     2020 -  Chronic   Active    DEREK BE

RNOT  [facility]



                     (7 sources.)        depressive          [facility]



                           disorder,                 [facility] -



                           single                    [place]



                           episode,                  (02788)



                                         unspecified     

 

      



           Other upper  Nasal     2020 -  Episodic  Active    EVAN ZACARIAS 

 [facility]



                     respiratory         congestion          [facility]



                           disease (9                Hospital -



                           sources.)                 [place]



                                         (79462)

 

      



           Nausea and  Nausea alone  2020 -  Episodic  Active    YASIR GALLEGOS  [facility]



                     vomiting (3         , MD Garcia



                           sources.)                 [facility] -



                                         [place]



                                         (06776)

 

      



           Substance-rela  Nicotine  2020 -  Chronic   Active    ANGEL   

 [facility]



                 abner disorders   dependence,     [facility]



                 (9 sources.)    cigarettes,     MD              [facility] -



                           uncomplicated             [place]



                                         (76726)

 

      



           Other female  Other     2020 -  Episodic  Active    ANGEL    V

[facility]



                 genital         specified       [facility]



                 disorders (9    noninflammator     MD              Hospital -



                     sources.)           y disorders of      [place]



                           vagina                    (74865)

 

      



           Other     Other     2020 -  Episodic  Active    ANGEL    [facility]

ia



                 complications   specified       [facility]



                 of pregnancy    pregnancy       MD              [facility] -



                     (9 sources.)        related             [place]



                           conditions,               (87519)



                                         first     



                                         trimester     

 

      



           Abdominal pain  Pelvic and  2020 -  Episodic  Active    ANGEL 

   [facility]



                 (16 sources.)   perineal pain     [facility] MD                        [facility] -



                                         [place]



                                         (03247)

 

      



           Screening and  Personal  2020 -  Episodic  Active    MARCELO MCNUL

TY ,  [facility]



                 history of      history of      [facility]



                     mental health       [facility]            Hospital -



                     and substance       dependence          [place]



                           abuse codes               (30953)



                                         (18 sources.)      

 

      



           Other     Personal  2020 -  Episodic  Active    DEREK BERNADRIAN  

[facility]



                     infections;         history of          [facility]



                     including           other               Hospital -



                     parasitic (9        infectious and      [place]



                     sources.)           parasitic           (02230)



                                         diseases     

 

      



           Prolonged  Post-term  2020 -  Episodic  Active    MARCELO TEE 

,  [facility]



                 pregnancy (9    pregnancy       [facility]



                           sources.)                 Hospital -



                                         [place]



                                         (17294)

 

      



           Other     Pregnancy  2020 -  Episodic  Active    MARCELO TEE ,

  [facility]



                 complications   related         [facility]



                     of pregnancy        conditions,         Hospital -



                     (10 sources.)       unspecified,        [place]



                           third                     (37675)



                                         trimester     

 

      



           Other lower  Shortness of  2020 -  Episodic  Active    JEREMI 

  [facility]



                 respiratory     breath          MD [facility] MARTIN



                           disease (1                Hospital -



                           source.)                  [place]



                                         (49608)

 

      



           Other     Single live  2020 -  Episodic  Active    MARCELO TEE

 ,  [facility]



                 pregnancy and   birth           [facility]



                           delivery                  Hospital -



                           including                 [place]



                           normal (9                 (59043)



                                         sources.)      

 

      



           Other     Unspecified  2020 -  Episodic  Active    ANGEL    [facility]

[facility]



                 complications   infection of     [facility]



                 of pregnancy    urinary tract     MD              Hospital -



                     (9 sources.)        in pregnancy,       [place]



                           first                     (73113)



                                         trimester     



            

            Past or Other Problems            

            



      



           Problem   Normalized  Date of   Normalized  Normalized  Provider  Fac

ility



              Classification  Problem(s)   Problem      Problem      Problem Sta

tus  



                           Onset/Resoluti            Duration   



                                         on    

 

      



            Residual   10 weeks   no information  no information  ANGEL     Not

 Available



                 codes;          gestation of     FELIX   (97517)



                     unclassified        pregnancy           MD 



                                         (4 sources.)      

 

      



            Residual   30 weeks   no information  no information  MARCELO TEE ,

  Not 

Available



                 codes;          gestation of     MD              (60447)



                           unclassified              pregnancy     



                                         (4 sources.)      

 

      



            Residual   37 weeks   no information  no information  MARCELOSUSSY GARCIA ,

  Not 

Available



                 codes;          gestation of     MD              (91897)



                           unclassified              pregnancy     



                                         (4 sources.)      

 

      



            Residual   38 weeks   no information  no information  KAY FENECH

  Not 

Available



                 codes;          gestation of     , DO            (93974)



                           unclassified              pregnancy     



                                         (1 source.)      

 

      



            Residual   40 weeks   no information  no information  MARCELO TEE ,

  [facility]



                 codes;          gestation of     [facility]



                     unclassified        pregnancy           Hospital -



                           (4 sources.)              [place]



                                         (72462)

 

      



            Residual   9 weeks    no information  no information  EVAN COSBY

ot Available



                     codes;              gestation of        (06078)



                           unclassified              pregnancy     



                                         (4 sources.)      

 

      



            External cause  Contact with   no information  no information  AGNIESZKA DOMINGO  

[facility]



                     codes:              [facility] saavedra



                     Cut/nguyen (3       initial             Hospital -



                     sources.)           encounter           [place]



                                         (62557)

 

      



            Contraceptive  Encounter for   Episodic   Completed  CHANDROUTIE  No

t Available



                 and             sterilization     [facility]        (35953)



                           procreative               Translations:     



                           management (2             [ ENCOUNTER     



                           sources.)                 FOR     



                                         STERILIZATION]     

 

      



            Residual   Less than 8   no information  no information  ANGEL     

Not Available



                 codes;          weeks           BRUEGMONTRELL ,   ([id number])



                     unclassified        gestation of        MD 



                           (3 sources.)              pregnancy     

 

      



            Mood disorders  Major      no information  no information  DEREK ADDISON  [facility]



                     (3 sources.)        depressive          [facility]



                           disorder,                 [facility] -



                           single                    [place]



                           episode,                  ([address]84)



                                         unspecified     

 

      



            Abdominal pain  Pelvic and   no information  no information  ANGEL 

    Not 

Available



                 (12 sources.)   perineal pain     FELIX   (66442)



                                         MD 

 

      



            Unclassified  no information  2020 -  no information  no infor

mil ESTRADA                            [facility]



                     (3 sources.)        , MD                [facility]



                                         (12106)

 

      



            Unclassified  no information  2020 -  no information  no infor

mil ESTRADA                            [facility]



                     (3 sources.)        , MD                [facility]



                                         (06125)

 

      



            Unclassified  no information  2020 -  no information  no infor

mil ESTRADA                            [facility]



                     (3 sources.)        , MD                [facility]



                                         (68789)

 

      



            Unclassified  no information  2020 -  no information  no infor

mil ESTRADA                            [facility]



                     (3 sources.)        , MD                [facility]



                                         (05197)



                                                                                
                                                                                
                                                                                
                                                                                
                                                                                
                                                                                
                                                                                
                                         



Procedures

                      



    



              Procedure    Normalized Procedure  Procedure Result  Performer    

Facility



                                         Date    

 

    



              04-   DELIVERY OF PRODUCTS  no information  no name (no randall

ne)  [facility] 

[facility]



                           OF CONCEPTION, EXTE       [facility]



                                         (80596)

 

    



                 DELIVERY OF PRODUCTS  no information  no name (no phone)  Not A

vailable 

(85447)



                                         OF CONCEPTION, EXTE   



                                                                                
       



Immunizations

                      



    



              Normalized   Immunization Date  Notes        Care Provider  Facili

ty



                                         Immunization    

 

    



              tetanus toxoid,  2019   no information  no name      [facility]

 [facility]



                           reduced diphtheria        [facility]



                           toxoid, and               (32901)



                                         acellular pertussis    



                                         vaccine, adsorbed    



                                                                              



Results

                      



     



            Test Name  Value      Interpretation  Reference Range  Date Time  Fa

cility



                     (Normalized)        (Normalized)        (Medline  



                                         Reference)  

 

 



                                         No panel



                                         information



                                         on null

 

     



                 Control         no information  (no code)       [facility]



                                         (42726)

 

     



                 Exp date        3/2020          (no code)       [facility]



                                         (93520)

 

     



                 Lot #           [id number]         (no code)       [facility]



                                         (51812)

 

 



                                         No panel



                                         information



                                         on 2019

 

     



              Basophils (Bld)  0.064 10*3/uL  (N)          0 - 0.3 10*3/uL   Pending [facility] to [facility]



                           [#/Vol]                   [facility]



                                         (23851)

 

     



              Basophils/100  0.7 %        (N)          0.5 - 1 %    [facility]

alth



                           WBC (Bld)                 [facility]



                                         (99125)

 

     



              Calcidiol    38 ng/mL     (N)          20 - 50 ng/mL   [facility]



                           [Mass/Vol]                [facility]



                                         (63624)

 

     



              Cobalamin    665 pg/mL    (N)          200 - 900 pg/mL   [facility]



                           (Vitamin B12)             [facility]



                           [Mass/Vol]                [facility]



                                         (32594)

 

     



              Eosinophils  0.218 10*3/uL  (N)          0.05 - 0.5   [facility]

alth



                     (Bld) [#/Vol]       10*3/uL             [facility]



                                         (20396)

 

     



              Eosinophils/100  2.4 %        (N)          1 - 4 %      [facility]



                           WBC (Bld)                 [facility]



                                         (30879)

 

     



              Erythrocyte  13.6 %       (N)          11.6 - 14.6 %   [facility]

[facility] ([facility]               [facility]



                           [Ratio]                   (12927)

 

     



              Free T4      1.1 ng/dL    (N)          0.9 - 2.2 ng/dL   [facility]



                           [Mass/Vol]                [facility]



                                         (88356)

 

     



              Hematocrit (Bld)  41.9 %       (N)          36.1 - 50.3 %   [facility]



                           [[facility]



                                         (20269)

 

     



              Hemoglobin (Bld)  13.4 g/dL    (N)          12.1 - 17.2 g/dL   [facility]ending [facility] to [facility]



                           [Mass/Vol]                [facility]



                                         (40856)

 

     



              Lymphocytes  2.83 10*3/uL  (N)          0.9 - 2.9    [facility]

lth



                     (Bld) [#/Vol]       10*3/uL             [facility]



                                         (84538)

 

     



              Lymphocytes/100  31.1 %       (N)          20 - 40 %    [facility]



                           WBC (Bld)                 [facility]



                                         (26444)

 

     



              MCH (RBC)    28.0 pg      (N)          27 - 31 pg   [facility]



                           [Entitic mass]            [facility]



                                         (65959)

 

     



              MCHC (RBC)   32.0 g/dL    (N)          32 - 36 g/dL   [facility]

alth



                           [Mass/Vol]                [facility]



                                         (10623)

 

     



              MCV (RBC)    87.5 fL      (N)          80 - 100 fL   [facility]

lt



                           [Entitic vol]             [facility]



                                         (60592)

 

     



              Monocytes (Bld)  0.555 10*3/uL  (N)          0.3 - 0.9    [facility]



                     [#/Vol]             10*3/uL             [facility]



                                         (83830)

 

     



              Monocytes/100  6.1 %        (N)          2 - 8 %      [facility]

alth



                           WBC (Bld)                 [facility]



                                         (55088)

 

     



              Neutrophils  5.433 10*3/uL  (N)          1.7 - 7 10*3/uL   [facility]



                           (Bld) [#/Vol]             [facility]



                                         (15952)

 

     



              Neutrophils/100  59.7 %       (N)          40 - 60 %    [facility]



                           WBC (Bld)                 [facility]



                                         (62886)

 

     



              Platelet mean  11.4 fL      (N)          7.2 - 11.7 fL   [facility]

 Health



                           volume (Bld)              [facility]



                           [Entitic vol]             [facility]



                                         (02661)

 

     



              Platelets (Bld)  244 10*3/uL  (N)          150 - 450    [facility] 

Health



                     [#/Vol]             10*3/uL             [facility]



                                         (74798)

 

     



              RBC (Bld)    4.79 10*6/uL  (N)          4.2 - 6.1    [facility]

lth



                     [#/Vol]             10*6/uL             [facility]



                                         (40175)

 

     



              TSH Qn       1.33 m[IU]/L  (N)          0.4 - 4 m[IU]/L   [facility]



                                         (59450)

 

     



              WBC (Bld)    9.1 10*3/uL  (N)          3.5 - 10.5   [facility]

th



                     [#/Vol]             10*3/uL             [facility]



                                         (48119)

 

 



                                         No panel



                                         information



                                         on 2019

 

     



                 BLO             no information  (no code)       [facility]

[facility]



                                         (66980)

 

     



                 KET             2019~clear  (no code)       [facility]

lth



                           ~yellow~none~neg          [facility]~negative~n          [facility]



                           egative                   (43097)

 

     



                 Lot #           [id number]          (no code)       [facility]



                                         (14922)

 

     



              pH (Bld)     6.0 [pH]     (no code)    7.38 - 7.42 [pH]   [facility]



                                         (97118)

 

     



              Protein (U)  no information  (no code)    0 - 20 mg/dL   [facility]



                           [Mass/Vol]                [facility]



                                         (12643)

 

     



                 SG              1.020           (no code)       [facility]



                                         (32811)

 

     



                 URO             0.2             (no code)       [facility]



                                         (91552)

 

 



                                         No panel



                                         information



                                         on 2019

 

     



              Albumin      4.3 g/dL     (N)          3.4 - 5.4 g/dL   [facility]



                           [Mass/Vol]                [facility]



                                         (86569)

 

     



              Albumin/Globulin  1.7 {ratio}  (N)          1 - 2.5 {ratio}   [facility]

[facility]



                           [Mass ratio]              [facility]



                                         (46571)

 

     



              ALP [Catalytic  71 U/L       (N)          44 - 147 U/L   [facility]



                           activity/Vol]             [facility]



                                         (97449)

 

     



              ALT [Catalytic  13 U/L       (N)          4 - 40 U/L   [facility] 

ealth



                           activity/Vol]             [facility]



                                         (38977)

 

     



              AST [Catalytic  12 U/L       (N)          10 - 34 U/L   [facility] 

Health



                           activity/Vol]             [facility]



                                         (03823)

 

     



              Bilirubin    0.5 mg/dL    (N)          0.1 - 1.2 mg/dL   [facility]



                           [Mass/Vol]                [facility]



                                         (49685)

 

     



              Calcium      9.0 mg/dL    (N)          8.5 - 10.2 mg/dL   [facility]



                           [Mass/Vol]                [facility]



                                         (55622)

 

     



              Chloride     106 mmol/L   (N)          95 - 106 mmol/L   [facility]

 Health



                           [Moles/Vol]               [facility]



                                         (27191)

 

     



              Cholesterol  133 mg/dL    (N)          180 - 200 mg/dL   [facility]



                           [Mass/Vol]                [facility]



                                         (42461)

 

     



                 Cholesterol in  50 mg/dL        (L)             [facility]



                           HDL [Mass/Vol]            [facility]



                                         (21100)

 

     



              Cholesterol in  73 mg/dL     (N)          0 - 100 mg/dL   [facility]



                           LDL [Mass/Vol]            [facility]



                                         (63161)

 

     



                 Cholesterol non  83 mg/dL        (N)             [facility]

th



                           HDL [Mass/Vol]            [facility]



                                         (65106)

 

     



                 Cholesterol.tota  2.7 {ratio}     (N)             [facility]



                           l/Cholesterol in          [facility]



                           HDL [Mass ratio]          [facility]



                                         (03461)

 

     



              CO2 [Moles/Vol]  26 mmol/L    (N)          23 - 29 mmol/L   [facility]



                                         (29590)

 

     



                 Creatinine      0.64 mg/dL      (N)             [facility]



                           [Mass/Vol]                [facility]



                                         (49128)

 

     



              GFR/1.73 sq M  140          (N)          90 - 120     [facility]



                 predicted among  mL/min/{1.73_m2}   mL/min/{1.73_m2}   [place] [facility]

f [facility]



                           blacks MDRD               [facility]



                           (S/P/Bld) [Vol            (07417)



                                         rate/Area]     

 

     



              GFR/1.73 sq  121          (N)          90 - 120     [facility]

th



                 M.predicted MDRD  mL/min/{1.73_m2}   mL/min/{1.73_m2}   [facility]



                           (S/P/Bld) [Vol            [facility]



                           rate/Area]                (27301)

 

     



              Globulin (S)  2.5 g/dL     (N)          2 - 3.5 g/dL   [facility]

ealth



                           [Mass/Vol]                [facility]



                                         (64324)

 

     



              Glucose      83 mg/dL     (N)          60 - 125 mg/dL   [facility]



                           [Mass/Vol]                [facility]



                                         (46141)

 

     



              Potassium    4.1 mmol/L   (N)          3.7 - 5.2 mmol/L   [facility]



                           [Moles/Vol]               [facility]



                                         (21530)

 

     



              Protein      6.8 g/dL     (N)          6.4 - 8.3 g/dL   [facility]



                           [Mass/Vol]                [facility]



                                         (92904)

 

     



              Sodium       140 mmol/L   (N)          135 - 145 mmol/L   [facility]



                           [Moles/Vol]               [facility]



                                         (85049)

 

     



              Triglyceride  34 mg/dL     (N)          0 - 150 mg/dL   [facility]



                           [Mass/Vol]                [facility]



                                         (69588)

 

     



              Urea nitrogen  10 mg/dL     (N)          7 - 20 mg/dL   [facility]



                           [Mass/Vol]                [facility]



                                         (55397)

 

     



                 Urea            NOT APPLICABLE  (no code)       [facility]

h



                           nitrogen/Creatin          [facility] [Mass ratio]          [facility]



                                         (94288)

 

 



                                         No panel



                                         information



                                         on 2017

 

     



              Beta HCG     no information  (no code)    2017   Not [facility]ail[facility]

le



                     (pregnancy test)     10:          (48922)



                                         Ql     

 

     



              CULTURE SOURCE  cath         (no code)    2017   Not [facility]ail[facility]

le



                           11:200400                (47363)

 

     



              FINAL CULTURE  No Growth 48  (no code)    2017   Not Avail[facility]

le



                 RESULTS         hours           11:      (33137)

 

     



              MEDIA PLATED  Setup at 12:02  (no code)    2017   Not Availa

ble



                     on 2017        11:          (85408)

 

     



              PRELIM CULTURE  No Growth 24  (no code)    2017   Not Availa

ble



                 RESULTS         hours           11:      (13453)

 

 



                                         No panel



                                         information



                                         on 2017

 

     



            Basophils (Bld)  0.0 10*3/uL  (no code)  0 - 0.3 10*3/uL  2017

  Not 

Available



                     [#/Vol]             15:040400          (02926)

 

     



            Basophils/100  0.20 %     (no code)  0.5 - 1 %  2017  Not Avai

lable



                     WBC (Bld)           15:          (76256)

 

     



            Eosinophils  0.5 10*3/uL  (no code)  0.05 - 0.5  2017  Not [place]

ilable



                 (Bld) [#/Vol]    10*3/uL         15:0      (50853)

 

     



            Eosinophils/100  5.5 %      (no code)  1 - 4 %    2017  Not Av

ailable



                     WBC (Bld)           15:          (42837)

 

     



            Erythrocyte  15.7 %     (H)        11.6 - 14.6 %  2017  Not Av

ailable



                     distribution        15:          (87784)



                                         width (RBC)     



                                         [Ratio]     

 

     



            Hematocrit (Bld)  38.4 %     (no code)  36.1 - 50.3 %  2017  N

ot Available



                     [Volume             15:          (83269)



                                         fraction]     

 

     



            Hemoglobin (Bld)  11.9 g/dL  (L)        12.1 - 17.2 g/dL  2017

  Not Available



                     [Mass/Vol]          15:          (58141)

 

     



            Lymphocytes  2.27 10*3/uL  (no code)  0.9 - 2.9  2017  Not [place]

ilable



                 (Bld) [#/Vol]    10*3/uL         15:0      (09746)

 

     



            Lymphocytes/100  26.1 %     (no code)  20 - 40 %  2017  Not Av

ailable



                     WBC (Bld)           15:0          (39332)

 

     



            MCH (RBC)  26.2 pg    (L)        27 - 31 pg  2017  Not Availab

le



                     [Entitic mass]      15:0          (67190)

 

     



            MCHC (RBC)  31.0 g/dL  (L)        32 - 36 g/dL  2017  Not Avai

lable



                     [Mass/Vol]          15:0400          (29957)

 

     



            MCV (RBC)  84.6 fL    (no code)  80 - 100 fL  2017  Not Availa

ble



                     [Entitic vol]       15:0400          (32632)

 

     



            Monocytes (Bld)  0.6 10*3/uL  (no code)  0.3 - 0.9  2017  Not 

Available



                 [#/Vol]         10*3/uL         15:0      (72924)

 

     



            Monocytes/100  7.0 %      (no code)  2 - 8 %    2017  Not Avai

lable



                     WBC (Bld)           15:          (00765)

 

     



            Neutrophils  5.31 10*3/uL  (no code)  1.7 - 7 10*3/uL  2017  N

ot 

Available



                     (Bld) [#/Vol]       15:          (86542)

 

     



            Neutrophils/100  61.2 %     (no code)  40 - 60 %  2017  Not Av

ailable



                     WBC (Bld)           15:-0400          (87595)

 

     



            Platelet mean  12.1 fL    (H)        7.2 - 11.7 fL  2017  Not 

Available



                     volume (Bld)        15:0400          (94892)



                                         [Entitic vol]     

 

     



            Platelets (Bld)  217 10*3/uL  (no code)  150 - 450  2017  Not 

Available



                 [#/Vol]         10*3/uL         15:0400      (18119)

 

     



            RBC (Bld)  4.54 10*6/uL  (no code)  4.2 - 6.1  2017  Not Avail

able



                 [#/Vol]         10*6/uL         15:      (39712)

 

     



            WBC (Bld)  8.69 10*3/uL  (no code)  3.5 - 10.5  2017  Not Avai

lable



                 [#/Vol]         10*3/uL         [id number]      ([id number])



                                                                                
                                                                                
                                                                                
                                                                                
                                                                                
                                                                                
                                                                                
                                                                                
                                                                                
                                                                                
                                                                                
                            



Vital Signs

                      The data below is from unstructured sources            



                    Vital                   Response                   Date/Time

                

 

                          Temperature (Fahrenheit)                   98.9 degree

s F (97.6 - 99.5)         

                                        08/15/2016 8:25pm                

 

                          Temperature (Calculated Celsius)                   37.

08252 degrees C (36.4 - 

37.5)                                   08/15/2016 8:25pm                

 

                    Temperature Source                   Temporal               

    08/15/2016 

8:25pm                

 

                    Pulse Rate (adult)                   94 bpm (60 - 90)       

            

08/15/2016 8:25pm                

 

                    Respiratory Rate                   18 bpm (12 - 24)         

          08/15/2016

 8:25pm                

 

                    O2 Sat by Pulse Oximetry                   98 % (88 - 100)  

                 

08/15/2016 8:25pm                

 

                    Blood Pressure                   126/77 mm Hg               

    08/15/2016 

8:25pm                

 

                     Blood Pressure Mean                   93 mm Hg             

      08/15/2016 

8:25pm                

 

                    Pain                                                       

 

                     Numeric Pain Scale                   8                   08

/15/2016 9:09pm     

           

 

                    Height (Feet)                   5 feet                   08/

15/2016 8:25pm      

          

 

                    Height (Inches)                   7 inches                  

 08/15/2016 8:25pm  

              

 

                          Height (Calculated Centimeters)                   170.

317585 cm                 

                                        08/15/2016 8:25pm                

 

                    Weight (Pounds)                   161 pounds                

   08/15/2016 8:25pm

                

 

                    Weight (Calculated Grams)                   02814.780 gm    

               

08/15/2016 8:25pm                

 

                          Weight (Calculated Kilograms)                   73.028

372 kilograms             

                                        08/15/2016 8:25pm                

 

                    Capillary Refill                                            

           

 

                     Capillary Refill                   Less Than 3 Seconds     

              

08/15/2016 8:25pm                

 

                    Height                   5 ft 7 in                          

         

 

                    Weight                   161 lb                             

      

 

                    Body Mass Index                   25.2 kg/m^2               

                    



            



                    Vital                   Response                   Date/Time

                

 

                          Temperature (Fahrenheit)                   98.9 degree

s F (97.6 - 99.5)         

                                        08/15/2016 9:22pm                

 

                          Temperature (Calculated Celsius)                   37.

62922 degrees C (36.4 - 

37.5)                                   08/15/2016 9:22pm                

 

                    Temperature Source                   Temporal               

    08/15/2016 

9:22pm                

 

                    Pulse Rate (adult)                   101 bpm (60 - 90)      

             

2016 7:38pm                

 

                    Respiratory Rate                   18 bpm (12 - 24)         

          2016

 7:38pm                

 

                    O2 Sat by Pulse Oximetry                   99 % (88 - 100)  

                 

2016 7:38pm                

 

                    Blood Pressure                   126/80 mm Hg               

    2016 

7:38pm                

 

                     Blood Pressure Mean                   95 mm Hg             

      2016 

7:38pm                

 

                    Pain                                                       

 

                     Numeric Pain Scale                   4                    7:38pm     

           

 

                    Height (Feet)                   5 feet                    7:38pm      

          

 

                    Height (Inches)                   6 inches                  

 2016 7:38pm  

              

 

                          Height (Calculated Centimeters)                   167.

918808 cm                 

                                        2016 7:38pm                

 

                    Weight (Pounds)                   164 pounds                

   2016 7:38pm

                

 

                    Weight (Calculated Grams)                   43173.780 gm    

               

08/15/2016 8:25pm                

 

                          Weight (Calculated Kilograms)                   74.389

149 kilograms             

                                        2016 7:38pm                

 

                    Capillary Refill                                            

           

 

                     Capillary Refill                   Less Than 3 Seconds     

              

2016 7:38pm                

 

                    Height                   5 ft 6 in                          

         

 

                    Weight                   164 lb                             

      

 

                    Body Mass Index                   26.5 kg/m^2               

                    



            



                    Vital                   Response                   Date/Time

                

 

                          Temperature (Fahrenheit)                   98.9 degree

s F (97.6 - 99.5)         

                                        2016 8:05pm                

 

                          Temperature (Calculated Celsius)                   37.

17224 degrees C (36.4 - 

37.5)                                   2016 8:05pm                

 

                    Temperature Source                   Temporal               

    2016 

8:05pm                

 

                    Pulse Rate (adult)                   75 bpm (60 - 90)       

            

2016 8:05pm                

 

                    Respiratory Rate                   16 bpm (12 - 24)         

          2016

 8:05pm                

 

                    O2 Sat by Pulse Oximetry                   98 % (88 - 100)  

                 

2016 8:05pm                

 

                    Blood Pressure                   128/67 mm Hg               

    2016 

8:05pm                

 

                     Blood Pressure Mean                   87 mm Hg             

      2016 

8:05pm                

 

                    Pain                                                       

 

                     Numeric Pain Scale                   6                    8:05pm     

           

 

                    Height (Feet)                   5 feet                    8:05pm      

          

 

                    Height (Inches)                   6 inches                  

 2016 8:05pm  

              

 

                          Height (Calculated Centimeters)                   167.

561951 cm                 

                                        2016 8:05pm                

 

                    Weight (Pounds)                   168 pounds                

   2016 8:05pm

                

 

                    Weight (Calculated Grams)                   58176.780 gm    

               

08/15/2016 8:25pm                

 

                          Weight (Calculated Kilograms)                   76.203

519 kilograms             

                                        2016 8:05pm                

 

                    Capillary Refill                                            

           

 

                     Capillary Refill                   Less Than 3 Seconds     

              

2016 8:05pm                

 

                    Height                   5 ft 6 in                          

         

 

                    Weight                   168 lb                             

      

 

                    Body Mass Index                   27.1 kg/m^2               

                    



            



                    Vital                   Response                   Date/Time

                

 

                          Temperature (Fahrenheit)                   97.0 degree

s F (97.6 - 99.5)         

                                        2016 2:30pm                

 

                          Temperature (Calculated Celsius)                   36.

56903 degrees C (36.4 - 

37.5)                                   2016 2:30pm                

 

                    Pulse Rate (adult)                   70 bpm (60 - 90)       

            

2016 2:30pm                

 

                    Respiratory Rate                   16 bpm (12 - 24)         

          2016

 2:30pm                

 

                    O2 Sat by Pulse Oximetry                   98 % (88 - 100)  

                 

2016 2:30pm                

 

                    Blood Pressure                   118/70 mm Hg               

    2016 

2:30pm                

 

                     Blood Pressure Mean                   86 mm Hg             

      2016 

2:30pm                

 

                    Pain                                                       

 

                     Pain Intensity                   0                   2016 2:30pm         

       

 

                    Height (Feet)                   5 feet                    2:30pm      

          

 

                    Height (Inches)                   6 inches                  

 2016 2:30pm  

              

 

                          Height (Calculated Centimeters)                   167.

449354 cm                 

                                        2016 2:30pm                

 

                    Weight (Pounds)                   160 pounds                

   2016 2:30pm

                

 

                          Weight (Calculated Kilograms)                   72.574

780 kilograms             

                                        2016 2:30pm                

 

                    Height                   5 ft 6 in                          

         

 

                    Weight                   160 lb                             

      

 

                    Body Mass Index                   25.8 kg/m^2               

                    



            



                    Vital                   Response                   Date/Time

                

 

                          Temperature (Fahrenheit)                   97.0 degree

s F (97.6 - 99.5)         

                                        2016 2:30pm                

 

                          Temperature (Calculated Celsius)                   36.

27659 degrees C (36.4 - 

37.5)                                   2016 2:30pm                

 

                    Pulse Rate (adult)                   70 bpm (60 - 90)       

            

2016 2:30pm                

 

                    Respiratory Rate                   16 bpm (12 - 24)         

          2016

 2:30pm                

 

                    O2 Sat by Pulse Oximetry                   98 % (88 - 100)  

                 

2016 2:30pm                

 

                    Blood Pressure                   118/70 mm Hg               

    2016 

2:30pm                

 

                     Blood Pressure Mean                   86 mm Hg             

      2016 

2:30pm                

 

                    Pain                                                       

 

                     Pain Intensity                   0                   2016 2:30pm         

       

 

                    Height (Feet)                   5 feet                    2:30pm      

          

 

                    Height (Inches)                   6 inches                  

 2016 2:30pm  

              

 

                          Height (Calculated Centimeters)                   167.

436040 cm                 

                                        2016 2:30pm                

 

                    Weight (Pounds)                   160 pounds                

   2016 2:30pm

                

 

                          Weight (Calculated Kilograms)                   72.574

780 kilograms             

                                        2016 2:30pm                

 

                    Height                   5 ft 6 in                          

         

 

                    Weight                   160 lb                             

      

 

                    Body Mass Index                   25.8 kg/m^2               

                    



                                                                    



Interventions

          No Information                                                        
  



Plan of Treatment

                      The data below is from unstructured sources            



                          Discharge Date                   08/15/16 9:22pm      

          

 

                          Disposition                   01 HOME, SELF-CARE      

          

 

                          Condition at Discharge                   Improved     

           

 

                          Instructions/Education Provided                   Urin

anthony Tract Infection in 

Women (ED)                

 

                          Prescriptions                   See Medication Section

                

 

                          Referrals                   [facility]ARCEL[facility]ARCIA [facility]          

          

                    

                    

NO,LOCAL PHYSICIAN - Primary Care Physician                                  

 

                          Additional Instructions/Education                   Fo

llow-up with Dr. Garcia 

in 48 hours to review urine culture results. Call                     

your office in the morning to schedule the appointment. Also obtain results of  
                   

the pelvic exam to ensure no signs of infection were found and ask if a repeat  
                   

pelvic exam is requested. Complete the entire course of antibiotics as          
           

prescribed. Drink plenty of clear liquids. Take Tylenol up to 1000 mg every 6   
                  

hours as needed for pain. Return to care if symptoms worsen.                    
 

                    

                    

                    

All discharge instructions reviewed with patient and/or family. Voiced          
           

understanding.                                  



            



                          Discharge Date                   16 8:16pm      

          

 

                          Disposition                   01 HOME, SELF-CARE      

          

 

                          Condition at Discharge                   Improved     

           

 

                          Instructions/Education Provided                   Uppe

r Respiratory Infection 

(ED)                

 

                          Prescriptions                   See Medication Section

                

 

                          Referrals                   MARCELO GARCIA MD - [facility] Physician          

          

                    

                    

NO,LOCAL PHYSICIAN - Primary Care Physician                                  

 

                          Additional Instructions/Education                   1.

 Medication as directed   

                  

                                        2. Follow-up with your obstetrician next

 week                     

                                        3. All discharge instructions reviewed w

ith patient and/or family. Voiced       

              

understanding.                                  



            



                          Discharge Date                   16 10:35pm     

           

 

                          Disposition                   01 HOME, SELF-CARE      

          

 

                          Condition at Discharge                   Improved     

           

 

                          Instructions/Education Provided                   Acut

e Abdominal Pain (ED)     

           

 

                          Prescriptions                   See Medication Section

                

 

                          Referrals                   MARCELO GARCIA MD [facility] Physician          

          

                    

                    

NO,LOCAL PHYSICIAN - Primary Care Physician                                  

 

                          Additional Instructions/Education                   Fo

llow-up with Dr. Garcia 

on Friday or early next week. Return to care if                     

symptoms worsen. You may take Tylenol for pain. Drink plenty of clear liquids.  
                   

 You need to review your vaginal and urine cultures with Dr. Garcia. Nothing   
                  

vaginal including intercourse until cleared by Dr. Garcia.                     

                    

                    

                    

All discharge instructions reviewed with patient and/or family. Voiced          
           

understanding.                                  



            



                          Discharge Date                   16 3:25pm      

          

 

                          Disposition                   01 HOME, SELF-CARE      

          

 

                          Condition at Discharge                   Improved     

           

 

                          Instructions/Education Provided                   NO I

NSTRUCTIONS GIVEN         

       

 

                          Prescriptions                   See Medication Section

                

 

                          Referrals                   NO,LOCAL PHYSICIAN - [facility] Physician         

           

                    

                    

THUY VELARDE,NAOMY LU MD, MD -                                   

 

                          Additional Instructions/Education                   1.

 [facility] done                     

                                        2. Return to ER for any concerns        

             

All discharge instructions reviewed with patient and/or family. Voiced          
           

understanding.                                  



            



                          Discharge Date                   16 3:25pm      

          

 

                          Disposition                   01 HOME, SELF-CARE      

          

 

                          Condition at Discharge                   Improved     

           

 

                          Instructions/Education Provided                   NO I

NSTRUCTIONS GIVEN         

       

 

                          Prescriptions                   See Medication Section

                

 

                          Referrals                   NO,LOCAL PHYSICIAN - [facility] Physician         

           

                    

                    

THUY VELARDE,GLORIA COSBY MD -                     

                    

                    

NAOMY TOTH MD -                                   

 

                          Additional Instructions/Education                   1.

 [facility] with [facility] to have a pelvic exam done                     

                                        2. Return to ER for any concerns        

             

All discharge instructions reviewed with patient and/or family. Voiced          
           

understanding.                                  



                                                                    



Goals

          No Information                                                        
  



Social History

          No Information                                                        
  



Functional Status

                      The data below is from unstructured sourcesNo functional 
status results.No functional status results.No functional status results.No 
functional status results.No functional status results.No functional status 
results.No functional status results.                                           
                         



Mental Status

          No Information                                                        
  



Encounters

                      



    



              Encounter    Normalized Encounter  Encounter Diagnosis  Care Provi

yuri  

Organization



                           Date                      Type   

 

    



              2020   Emergency department  no information  JEREMI HIDALGO MD  [facility] 

[address]



                 -               patient visit   (no phone)      [facility]



                           2020                (no phone)

 

    



              2019   Emergency department  no information  no name (no [facility]

ne)  no 

organization name



                     -                   patient visit       (no phone)



                                         2019   Emergency department  no information  DEREK LASSITER (no  [facility] 

[address]



                 -               patient visit   phone)          [facility]



                           2019                (no phone)

 

    



              2016   Emergency department  no information  ANGEL JOHNSON  [facility]

 [address]



                 -               patient visit   MD (no phone)   [facility]



                           2016                (no phone)

 

    



              2016   Emergency department  no information  EVAN ALVAREZ (no  [facility] 

[address]



                 -               patient visit   phone)          [facility]



                           2016                (no phone)

 

    



              08-   Emergency department  no information  ANGEL JOHNSON  [facility] [address]

 [address]



                 -               patient visit   MD (no phone)   [facility]



                           08-                (no phone)

 

    



              2017   Evaluation and  no information  no name (no phone)  n

o organization

 name



                     -                   management of       (no phone)



                           2017                inpatient   

 

    



              2017   Evaluation and  no information  MARCELO GARCIA MD (no

  [facility] [address] 

[facility]



                 -               management of   phone)          [facility]



                     2017          inpatient           (no phone)

 

    



              2008   Patient encounter  no information  no name (no phone)

  no 

organization name



                                         (no phone)

 

    



              2020   Patient encounter  no information  JEREMI MARTIN MD  [facility] 

[facility]



                     procedure           (no phone)          [facility]



                                         (no phone)

 

    



              2019   Patient encounter  no information  no name (no phone)

  no 

organization name



                           procedure                 (no phone)

 

    



              2019   Patient encounter  no information  no name (no phone)

  no 

organization name



                           procedure                 (no phone)

 

    



              2019   Patient encounter  no information  no name (no phone)

  no 

organization name



                           procedure                 (no phone)

 

    



              2019   Patient encounter  no information  no name (no phone)

  no 

organization name



                           procedure                 (no phone)

 

    



              2019   Patient encounter  no information  no name (no phone)

  no 

organization name



                           procedure                 (no phone)

 

    



              08-   Patient encounter  no information  no name (no phone)

  no 

organization name



                           procedure                 (no phone)

 

    



              08-   Patient encounter  no information  no name (no phone)

  no 

organization name



                           procedure                 (no phone)

 

    



              2019   Patient encounter  no information  no name (no phone)

  no 

organization name



                           procedure                 (no phone)

 

    



              2019   Patient encounter  no information  no name (no phone)

  no 

organization name



                           procedure                 (no phone)

 

    



              2019   Patient encounter  no information  no name (no phone)

  no 

organization name



                           procedure                 (no phone)

 

    



              2019   Patient encounter  no information  no name (no phone)

  no 

organization name



                           procedure                 (no phone)

 

    



              2019   Patient encounter  no information  no name (no phone)

  no 

organization name



                           procedure                 (no phone)

 

    



              2019   Patient encounter  no information  no name (no phone)

  no 

organization name



                           procedure                 (no phone)

 

    



              2019   Patient encounter  no information  no name (no phone)

  no 

organization name



                           procedure                 (no phone)

 

    



              2019   Patient encounter  no information  no name (no phone)

  no 

organization name



                           procedure                 (no phone)

 

    



              2019   Patient encounter  no information  no name (no phone)

  no 

organization name



                           procedure                 (no phone)

 

    



              2019   Patient encounter  no information  no name (no phone)

  no 

organization name



                           procedure                 (no phone)

 

    



              2018   Patient encounter  no information  no name (no phone)

  no 

organization name



                           procedure                 (no phone)

 

    



              2017   Patient encounter  no information  no name (no phone)

  no 

organization name



                     -                   procedure           (no phone)



                                         2017   Patient encounter  no information  no name (no phone)

  no 

organization name



                     -                   procedure           (no phone)



                                         2017   Patient encounter  no information  no name (no phone)

  no 

organization name



                     -                   procedure           (no phone)



                                         2017   Patient encounter  no information  no name (no phone)

  no 

organization name



                     -                   procedure           (no phone)



                                         2017   Patient encounter  no information  KAY RAVI  [facility] 

[facility]



                 -               procedure       (no phone)      [facility]



                           2017                (no phone)

 

    



              2017   Patient encounter  no information  no name (no phone)

  no 

organization name



                     -                   procedure           (no phone)



                                         2017   Patient encounter  no information  MARCELO GARCIA MD 

(no  [facility] 

[facility]



                 -               procedure       phone)          [facility]



                           2017                (no phone)

 

    



              2017   Patient encounter  no information  no name (no phone)

  no 

organization name



                     -                   procedure           (no phone)



                                         2017   Patient encounter  no information  MARCELO GARCIA MD 

(no  [facility] 

[facility]



                 -               procedure       phone)          [facility]



                           2017                (no phone)

 

    



              2008   Patient encounter  no information  YASIR ESTRADA MD

 (no  [facility] 

[facility]



                     procedure           phone)              [facility]



                                         (no phone)

 

    



                 Patient encounter  no information  no name (no phone)  no organ

ization name



                           procedure                 (no phone)



                                                                                
                                                                                
                                                                                
                                                                                
                                                                                
                                                          



Medical Equipment

          No Information                                                        
  



Payers

                      



 



                           Normalized Payer          Value

 

 



                           Medicaid                  no information



                                                                              



Advance Directives

                      



                    Directive                   Response                   Recor

ded Date/Time       

         

 

                    Advance Directives                   No                   08

/15/16 8:25pm       

         

 

                    Health Care Power of                    No          

         08/15/16 

8:25pm                

 

                    Resuscitation Status                   Full Code            

       08/15/16 

8:25pm                



            



                    Directive                   Response                   Recor

ded Date/Time       

         

 

                    Advance Directives                   No                   08

/15/16 8:25pm       

         

 

                    Health Care Power of                    No          

         08/15/16 

8:25pm                



            



                    Directive                   Response                   Recor

ded Date/Time       

         

 

                    Advance Directives                   No                    8:05pm       

         

 

                    Health Care Power of                    No          

         16 

8:05pm                

 

                    Resuscitation Status                   Full Code            

       16 

8:05pm                



            



                    Directive                   Response                   Recor

ded Date/Time       

         

 

                    Advance Directives                   No                    2:33pm       

         

 

                    Health Care Power of                    No          

         16 

2:33pm                

 

                    Resuscitation Status                   Full Code            

       16 

2:33pm                



                                                                    



Discharge Instructions

          No hospital discharge instructions.No hospital discharge 
instructions.No hospital discharge instructions.No hospital discharge 
instructions.                                                



Additional Source Comments

          This clinical document has been generated using [employer] 
software that has been certified by the Office of the National Coordinator for 
Health Information Technology (ONC 15.99.04.3023.Diam.31.00.0.608099) and the 
National Committee for  (NCQA, as an eMeasure certified 
technology).            

            

FOR RECORDS PERTAINING TO PATIENTS WHO ARE OR HAVE BEEN ENROLLED IN A CHEMICAL D
EPENDENCY/SUBSTANCE ABUSE PROGRAM, SOME INFORMATION MAY BE OMITTED. This clinica
l summary was aggregated from multiple sources.  Caution should be exercised in 
using it in the provision of clinical care. This summary normalizes information 
from multiple sources, and as a consequence, information in this document may ma
terially change the coding, format and clinical context of patient data. In randal
tion, data may be omitted in some cases. CLINICAL DECISIONS SHOULD BE BASED ON T
HE PRIMARY CLINICAL RECORDS. [email or web address] provides no warranty or guara
ntee of the accuracy or completeness of information in this document.The followi
ng information is based on time limited clinical information

## 2020-05-01 NOTE — XMS REPORT
Continuity of Care Document

                             Created on: 2020



Iman Sims

External Reference #: [id number]

: 1990

Sex: Female



Demographics





                          Address                   [address] [place] [address]  [address]

 

                          Home Phone                [phone] x

 

                          Preferred Language        Unknown

 

                          Marital Status            Unknown

 

                          Roman Catholic Affiliation     Unknown

 

                          Race                      Unknown

 

                          Ethnic Group              Unknown





Author





                          Organization              Unknown

 

                          Address                   Unknown

 

                          Phone                     Unavailable



              



Allergies

      



             Active           Description           Code           Type         

  Severity   

                Reaction           Onset           Reported/Identified          

 

Relationship to Patient                 Clinical Status        

 

                Yes             No Known Drug Allergies           [id number]    

       Drug 

Allergy           Unknown           N/A                             04/15/2020  

      

                                                             



                  



Medications

      



There is no data.                  



Problems

      



             Date Dx Coded           Attending           Type           Code    

       

Diagnosis                               Diagnosed By        

 

             2016           EVAN ZACARIAS           Ot           R10

.2           

PELVIC AND PERINEAL PAIN                         

 

                08/15/2016           ANGEL WASHINGTON MD T           Ot      

        F17.210    

                          NICOTINE DEPENDENCE, CIGARETTES, UNCOMPL              

      

 

                08/15/2016           ANGEL WASHINGTON MD T           Ot      

        O23.41     

                          UNSP INFCT OF URINARY TRACT IN PREGNANCY              

      

 

                08/15/2016           ANGEL WASHINGTON MD T           Ot      

        R30.0      

                          DYSURIA                            

 

                08/15/2016           ANGEL WASHINGTON MD T           Ot      

        Z3A.01     

                          LESS THAN 8 WEEKS GESTATION OF PREGNANCY              

      

 

                2016           ANGEL WASHINGTON MD T           Ot      

        F17.210    

                          NICOTINE DEPENDENCE, CIGARETTES, UNCOMPL              

      

 

                2016           ANGEL WASHINGTON MD T           Ot      

        O23.41     

                          UNSP INFCT OF URINARY TRACT IN PREGNANCY              

      

 

                2016           ANGEL WASHINGTON MD T           Ot      

        R30.0      

                          DYSURIA                            

 

                2016           ANGEL WASHINGTON MD T           Ot      

        Z3A.01     

                          LESS THAN 8 WEEKS GESTATION OF PREGNANCY              

      

 

             2016           EVAN ZACARIAS           Ot           J06

.9           

ACUTE UPPER RESPIRATORY INFECTION, UNSPE                    

 

                2016           EVAN ZACARIAS APRN           Ot           

   O99.511         

                          DISEASES OF THE RESP SYS COMP PREGNANCY,              

      

 

                2016           EVAN ZACARIAS           Ot           

   R09.81          

                          NASAL CONGESTION                    

 

             2016           EVAN ZACARIAS           Ot           R42

           

DIZZINESS AND GIDDINESS                          

 

                2016           EVAN ZACARIAS           Ot           

   Z3A.09          

                          9 WEEKS GESTATION OF PREGNANCY                    

 

             2016           EVAN ZACARIAS           Ot           J06

.9           

ACUTE UPPER RESPIRATORY INFECTION, UNSPE                    

 

                2016           EVAN ZACARIAS           Ot           

   O99.511         

                          DISEASES OF THE RESP SYS COMP PREGNANCY,              

      

 

                2016           EVAN ZACARIAS           Ot           

   R09.81          

                          NASAL CONGESTION                    

 

             2016           EVAN ZACARIAS APRN           Ot           R42

           

DIZZINESS AND GIDDINESS                          

 

                2016           EVAN ZACARIAS APRN           Ot           

   Z3A.09          

                          9 WEEKS GESTATION OF PREGNANCY                    

 

                2016           ANGEL WASHINGTON MD T           Ot      

        N89.8      

                          OTHER SPECIFIED NONINFLAMMATORY DISORDER              

      

 

                2016           ANGEL WASHINGTON MD T           Ot      

        O23.591    

                          INFECTION OTH PRT GENITL TRCT IN PREGNAN              

      

 

                2016           ANGEL WASHINGTON MD           Ot      

        O26.891    

                          OTH PREGNANCY RELATED CONDITIONS, FIRST               

      

 

                2016           ANGEL WASHINGTON MD           Ot      

        R10.2      

                          PELVIC AND PERINEAL PAIN                    

 

                2016           ANGEL WASHINGTON MD T           Ot      

        Z3A.10     

                          10 WEEKS GESTATION OF PREGNANCY                    

 

                09/15/2016           ANGEL WASHINGTON MD T           Ot      

        N89.8      

                          OTHER SPECIFIED NONINFLAMMATORY DISORDER              

      

 

                09/15/2016           ANGEL WASHINGTON MD           Ot      

        O23.591    

                          INFECTION OTH PRT GENITL TRCT IN PREGNAN              

      

 

                09/15/2016           ANGEL WASHINGTON MD T           Ot      

        O26.891    

                          OTH PREGNANCY RELATED CONDITIONS, FIRST               

      

 

                09/15/2016           ANGEL WASHINGTON MD           Ot      

        R10.2      

                          PELVIC AND PERINEAL PAIN                    

 

                09/15/2016           ANGEL WASHINGTON MD T           Ot      

        Z3A.10     

                          10 WEEKS GESTATION OF PREGNANCY                    

 

                2016           ANGEL WASHINGTON MD T           Ot      

        N89.8      

                          OTHER SPECIFIED NONINFLAMMATORY DISORDER              

      

 

                2016           ANGEL WASHINGTON MD T           Ot      

        O23.591    

                          INFECTION OTH PRT GENITL TRCT IN PREGNAN              

      

 

                2016           ANGEL WASHINGTON MD           Ot      

        O26.891    

                          OTH PREGNANCY RELATED CONDITIONS, FIRST               

      

 

                2016           ANGEL WASHINGTON MD T           Ot      

        R10.2      

                          PELVIC AND PERINEAL PAIN                    

 

                2016           ANGEL WASHINGTON MD T           Ot      

        Z3A.10     

                          10 WEEKS GESTATION OF PREGNANCY                    

 

             2017           MARCELO OLIVEIRA MD           Ot           O26.

93           

PREGNANCY RELATED CONDITIONS, UNSPECIFIE                    

 

             2017           MARCELO OLIVEIRA MD           Ot           R10.

2           

PELVIC AND PERINEAL PAIN                         

 

             2017           MARCELO OLIVEIRA MD           Ot           Z3A.

30           

30 WEEKS GESTATION OF PREGNANCY                    

 

             2017           MARCELO OLIVEIRA MD           Ot           O26.

93           

PREGNANCY RELATED CONDITIONS, UNSPECIFIE                    

 

             2017           MARCELO OLIVEIRA MD           Ot           R10.

2           

PELVIC AND PERINEAL PAIN                         

 

             2017           MARCELO OLIVEIRA MD           Ot           Z3A.

30           

30 WEEKS GESTATION OF PREGNANCY                    

 

             2017           MARCELO OLIVEIRA MD           Ot           O47.

1           

FALSE LABOR AT OR AFTER 37 COMPLETED WEE                    

 

             2017           MARCELO OLIVEIRA MD           Ot           Z3A.

37           

37 WEEKS GESTATION OF PREGNANCY                    

 

             2017           MARCELO OLIVEIRA MD           Ot           O47.

1           

FALSE LABOR AT OR AFTER 37 COMPLETED WEE                    

 

             2017           MARCELO OLIVEIRA MD           Ot           Z3A.

37           

37 WEEKS GESTATION OF PREGNANCY                    

 

                2017           FENECH DO, KAY S           Ot          

    O47.1          

                          FALSE LABOR AT OR AFTER 37 COMPLETED WEE              

      

 

                2017           FENECH DO KAY S           Ot          

    Z3A.38         

                          38 WEEKS GESTATION OF PREGNANCY                    

 

                2017           FENECH DOKAY S           Ot          

    O47.1          

                          FALSE LABOR AT OR AFTER 37 COMPLETED WEE              

      

 

                2017           FENECH DOKAY S           Ot          

    Z3A.38         

                          38 WEEKS GESTATION OF PREGNANCY                    

 

             2017           MARCELO OLIVEIRA MD           Ot           D50.

9           

IRON DEFICIENCY ANEMIA, UNSPECIFIED                    

 

             2017           MARCELO OLIVEIRA MD           Ot           O48.

0           

POST-TERM PREGNANCY                              

 

                2017           MARCELO OLIVEIRA MD           Ot            

  O69.2XX0         

                          LABOR AND DEL COMP BY OTH CORD ENTANGLE,              

      

 

                2017           MARCELO OLIVEIRA MD           Ot            

  O69.81X0         

                          LABOR AND DEL COMP BY CORD AROUND NECK,               

      

 

                2017           MARCELO OLIVEIRA MD           Ot            

  O99.013          

                          ANEMIA COMPLICATING PREGNANCY, THIRD TRI              

      

 

             2017           MARCELO OLIVEIRA MD           Ot           Z37.

0           

SINGLE LIVE BIRTH                                

 

             2017           TEE SHEEHAN, MARCELO COSBY           Ot           Z3A.

40           

40 WEEKS GESTATION OF PREGNANCY                    

 

                2017           MARCELO OLIVEIRA MD           Ot            

  Z87.891          

                          PERSONAL HISTORY OF NICOTINE DEPENDENCE               

     

 

           2017                       Ot           787.02                 

           

   

 

           2017                       Ot           998.9                  

           

  

 

           2017                       Ot           E878.6                 

           

   

 

           2017                       Ot           V58.69                 

           

   

 

           2017                       Ot           787.02                 

           

   

 

           2017                       Ot           998.9                  

           

  

 

           2017                       Ot           E878.6                 

           

   

 

           2017                       Ot           V58.69                 

           

   

 

             2019           DEREK DOMINGO           Ot           F32.9   

        MAJOR

DEPRESSIVE DISORDER, SINGLE EPISOD                    

 

             2019           DEREK DOMINGO           Ot           F41.9   

        

ANXIETY DISORDER, UNSPECIFIED                    

 

             2019           THOMAS DOMINGOIS           Ot           S81.011A

           

LACERATION WITHOUT FOREIGN BODY, RIGHT K                    

 

             2019           DEREK DOMINGO           Ot           W25.XXXA

           

CONTACT WITH SHARP GLASS, INITIAL ENCOUN                    

 

             2019           DEREK DOMINGO           Ot           Z23     

      

ENCOUNTER FOR IMMUNIZATION                       

 

             2019           DEREK DOMINGO           Ot           Z80.0   

        

FAMILY HISTORY OF MALIGNANT NEOPLASM OF                     

 

             2019           DEREK DOMINGO           Ot           Z80.1   

        

FAMILY HISTORY OF MALIG NEOPLASM OF TRAC                    

 

             2019           DEREK DOMINGO           Ot           Z80.3   

        

FAMILY HISTORY OF MALIGNANT NEOPLASM OF                     

 

             2019           THOMAS DOMINGOIS           Ot           Z82.49  

         

FAMILY HX OF ISCHEM HEART DIS AND OTH DI                    

 

             2019           DEREK DOMINGO           Ot           Z86.19  

         

PERSONAL HISTORY OF OTHER INFECTIOUS AND                    

 

             2019           DEREK DOMINGO           Ot           Z87.448 

          

PERSONAL HISTORY OF OTHER DISEASES OF UR                    

 

             2019           DEREK DOMINGO           Ot           Z87.891 

          

PERSONAL HISTORY OF NICOTINE DEPENDENCE                    

 

             2019           DEREK DOMINGO           Ot           Z90.49  

         

ACQUIRED ABSENCE OF OTHER SPECIFIED PART                    

 

             2019           DEREK DOMINGO           Ot           Z90.89  

         

ACQUIRED ABSENCE OF OTHER ORGANS                    

 

             2019           DEREK DOMINGO           Ot           F32.9   

        MAJOR

DEPRESSIVE DISORDER, SINGLE EPISOD                    

 

             2019           THOMAS DOMINGOIS           Ot           F41.9   

        

ANXIETY DISORDER, UNSPECIFIED                    

 

             2019           DEREK DOMINGO           Ot           S81.011A

           

LACERATION WITHOUT FOREIGN BODY, RIGHT K                    

 

             2019           ROSALIOADRIAN DEREK           Ot           W25.XXXA

           

CONTACT WITH SHARP GLASS, INITIAL ENCOUN                    

 

             2019           DEREK DOMINGO           Ot           Z23     

      

ENCOUNTER FOR IMMUNIZATION                       

 

             2019           CHAYO DEREK           Ot           Z80.0   

        

FAMILY HISTORY OF MALIGNANT NEOPLASM OF                     

 

             2019           DEREK DOMINGO           Ot           Z80.1   

        

FAMILY HISTORY OF MALIG NEOPLASM OF TRAC                    

 

             2019           DEREK DOMINGO           Ot           Z80.3   

        

FAMILY HISTORY OF MALIGNANT NEOPLASM OF                     

 

             2019           DEREK DOMINGO           Ot           Z82.49  

         

FAMILY HX OF ISCHEM HEART DIS AND OTH DI                    

 

             2019           DEREK DOMINGO           Ot           Z86.19  

         

PERSONAL HISTORY OF OTHER INFECTIOUS AND                    

 

             2019           DEREK DOMINGO           Ot           Z87.448 

          

PERSONAL HISTORY OF OTHER DISEASES OF UR                    

 

             2019           DEREK DOMINGO           Ot           Z87.891 

          

PERSONAL HISTORY OF NICOTINE DEPENDENCE                    

 

             2019           DEREK DOMINGO           Ot           Z90.49  

         

ACQUIRED ABSENCE OF OTHER SPECIFIED PART                    

 

             2019           DEREK DOMINGO           Ot           Z90.89  

         

ACQUIRED ABSENCE OF OTHER ORGANS                    

 

           2020                       Ot           787.02                 

           

   

 

           2020                       Ot           998.9                  

           

  

 

           2020                       Ot           E878.6                 

           

   

 

           2020                       Ot           V58.69                 

           

   

 

                2020           JEREMI MARTIN MD           Ot        

      F32.9        

                          MAJOR DEPRESSIVE DISORDER, SINGLE EPISOD              

      

 

                2020           JEREMI MARTIN MD           Ot        

      F41.9        

                          ANXIETY DISORDER, UNSPECIFIED                    

 

                2020           JEREMI MARTIN MD           Ot        

      N30.01       

                          ACUTE CYSTITIS WITH HEMATURIA                    

 

                2020           JEREMI MARTIN MD           Ot        

      R06.02       

                          SHORTNESS OF BREATH                    

 

                2020           JEREMI MARTIN MD           Ot        

      F32.9        

                          MAJOR DEPRESSIVE DISORDER, SINGLE EPISOD              

      

 

                2020           JEREMI MARTIN MD           Ot        

      F41.9        

                          ANXIETY DISORDER, UNSPECIFIED                    

 

                2020           JEREMI MARTIN MD           Ot        

      N30.01       

                          ACUTE CYSTITIS WITH HEMATURIA                    

 

                2020           JEREMI MARTIN MD           Ot        

      R06.02       

                          SHORTNESS OF BREATH                    

 

                04/15/2020           EVAN ZACARIAS           Ot           

   R10.12          

                          LEFT UPPER QUADRANT PAIN                    

 

             04/15/2020           ZACARIAS, PETER J APRN           Ot           R42

           

DIZZINESS AND GIDDINESS                          

 

             04/15/2020           ZACARIAS, EVAN HUTCHINSON APRN           Ot           R55

           

SYNCOPE AND COLLAPSE                             

 

                2020           ZACARIAS, EVAN HUTCHINSON APRN           Ot           

   R10.12          

                          LEFT UPPER QUADRANT PAIN                    

 

             2020           ZACARIAS, EVAN HUTCHINSON APRN           Ot           R42

           

DIZZINESS AND GIDDINESS                          

 

             2020           ZACARIAS, EVAN HUTCHINSON APRN           Ot           R55

           

SYNCOPE AND COLLAPSE                             

 

                2020           ZACARIAS, EVAN HUTCHINSON APRN           Ot           

   R10.12          

                          LEFT UPPER QUADRANT PAIN                    

 

             2020           ZACARIAS, EVAN HUTCHINSON APRN           Ot           R42

           

DIZZINESS AND GIDDINESS                          

 

             2020           ZACARIAS, EVAN HUTCHINSON APRN           Ot           R55

           

SYNCOPE AND COLLAPSE                             

 

                2020           ZACARIAS, EVAN HUTCHINSON APRN           Ot           

   R10.12          

                          LEFT UPPER QUADRANT PAIN                    

 

             2020, EVAN HUTCHINSON APRN           Ot           R42

           

DIZZINESS AND GIDDINESS                          

 

             2020           ZACARIAS, EVAN HUTCHINSON APRN           Ot           R55

           

SYNCOPE AND COLLAPSE                             



                                                                                
                                                                                
                                                                                
     



Procedures

      



                Code            Description           Performed By           Per

formed On        

 

                                      [id number]                                 [facility] OF PRODUCTS OF 

CONCEPTION, EXTE                                               04/10/2017       

 



                  



Results

      



                    Test                Result              Range        

 

                                        Complete urinalysis with reflex to cultu

re - 08/15/16 20:24         

 

                    Urine color determination           YELLOW              NRG 

       

 

                    Urine clarity determination           CLEAR               NR

G        

 

                    Urine pH measurement by test strip           7              

     5-9        

 

                    Specific gravity of urine by test strip           1.005     

          1.016-1.022  

     

 

                    Urine protein assay by test strip, semi-quantitative        

   1+                  

NEGATIVE        

 

                    Urine glucose detection by automated test strip           NE

GATIVE            

NEGATIVE        

 

                          Erythrocytes detection in urine sediment by light micr

oscopy           5+       

                                        NEGATIVE        

 

                    Urine ketones detection by automated test strip           NE

GATIVE            

NEGATIVE        

 

                    Urine nitrite detection by test strip           NEGATIVE    

        NEGATIVE    

   

 

                    Urine total bilirubin detection by test strip           NEGA

TIVE            

NEGATIVE        

 

                          Urine urobilinogen measurement by automated test strip

 (mass/volume)           

NORMAL                                  NORMAL        

 

                    Urine leukocyte esterase detection by dipstick           3+ 

                 NEGATIVE 

      

 

                                        Automated urine sediment erythrocyte cou

nt by microscopy (number/high power 

field)                     [HPF]                    NRG        

 

                                        Automated urine sediment leukocyte count

 by microscopy (number/high power field)

                           [HPF]                    NRG        

 

                          Bacteria detection in urine sediment by light microsco

py           TRACE        

                                        NRG        

 

                                        Squamous epithelial cells detection in u

rine sediment by light microscopy       

                          0-2                       NRG        

 

                          Crystals detection in urine sediment by light microsco

py           NONE         

                                        NRG        

 

                    Casts detection in urine sediment by light microscopy       

    NONE                

NRG        

 

                          Mucus detection in urine sediment by light microscopy 

          NEGATIVE        

                                        NRG        

 

                    Complete urinalysis with reflex to culture           YES    

             NRG        

 

                                        Bacterial urine culture - 08/15/16 20:24

         

 

                    Bacterial urine culture           [id number]            NRG   

     

 

                    COLONY COUNT           >100,000/ML            NRG        

 

                    FTX;REPORTABLE           SENSITIVITY REPORTED 16 13:30 

           NRG      

  

 

                    URINE CULTURE RESULTS           <10,000/ML            NRG   

     

 

                                        Bacterial susceptibility panel - 08/15/1

6 20:24         

 

                          Gentamicin susceptibility test by minimum inhibitory c

oncentration           <= 

                                        NRG        

 

                                        Trimethoprim/sulfamethoxazole susceptibi

lity test by minimum 

inhibitoryconcentration           <=                        NRG        

 

                          Ampicillin susceptibility test by minimum inhibitory c

oncentration           <= 

                                        NRG        

 

                          Tobramycin susceptibility test by minimum inhibitory c

oncentration           <= 

                                        NRG        

 

                          Cefazolin susceptibility test by minimum inhibitory co

ncentration           <=  

                                        NRG        

 

                          Ceftriaxone susceptibility test by minimum inhibitory 

concentration           <=

                                        NRG        

 

                                        Ampicillin/sulbactam susceptibility test

 by minimum inhibitory concentration    

                          <=                        NRG        

 

                                        Piperacillin/tazobactam susceptibility t

est by minimum inhibitory concentration 

                          <=                        NRG        

 

                          Ciprofloxacin susceptibility test by minimum inhibitor

y concentration           

<=                                      NRG        

 

                          Meropenem susceptibility test by minimum inhibitory co

ncentration           0.5 

                                        NRG        

 

                                        Nitrofurantoin susceptibility test by mi

nimum inhibitory concentration          

                          128                       NRG        

 

                          Aztreonam susceptibility test by minimum inhibitory co

ncentration           <=  

                                        NRG        

 

                                        Complete urinalysis with reflex to cultu

re - 16 20:26         

 

                    Urine color determination           YELLOW              NRG 

       

 

                    Urine clarity determination           CLEAR               NR

G        

 

                    Urine pH measurement by test strip           7              

     5-9        

 

                    Specific gravity of urine by test strip           1.010     

          1.016-1.022  

      

 

                          Urine protein assay by test strip, semi-quantitative  

         NEGATIVE         

                                        NEGATIVE        

 

                    Urine glucose detection by automated test strip           NE

GATIVE            

NEGATIVE        

 

                          Erythrocytes detection in urine sediment by light micr

oscopy           NEGATIVE 

                                        NEGATIVE        

 

                    Urine ketones detection by automated test strip           NE

GATIVE            

NEGATIVE        

 

                    Urine nitrite detection by test strip           NEGATIVE    

        NEGATIVE    

    

 

                    Urine total bilirubin detection by test strip           NEGA

TIVE            

NEGATIVE        

 

                          Urine urobilinogen measurement by automated test strip

 (mass/volume)           

NORMAL                                  NORMAL        

 

                    Urine leukocyte esterase detection by dipstick           2+ 

                 NEGATIVE 

       

 

                                        Automated urine sediment erythrocyte cou

nt by microscopy (number/high power 

field)                    NONE                      NRG        

 

                                        Automated urine sediment leukocyte count

 by microscopy (number/high power field)

                           [HPF]                    NRG        

 

                          Bacteria detection in urine sediment by light microsco

py           FEW          

                                        NRG        

 

                                        Squamous epithelial cells detection in u

rine sediment by light microscopy       

                          2-5                       NRG        

 

                          Crystals detection in urine sediment by light microsco

py           NONE         

                                        NRG        

 

                    Casts detection in urine sediment by light microscopy       

    NONE                

NRG        

 

                          Mucus detection in urine sediment by light microscopy 

          NEGATIVE        

                                        NRG        

 

                    Complete urinalysis with reflex to culture           NO     

             NRG        

 

                                        Bacterial urine culture - 16 20:26

         

 

                    Bacterial urine culture           [id number]            NRG   

     

 

                    COLONY COUNT           >100,000/ML            NRG        

 

                                        Complete blood count (CBC) with automate

d white blood cell (WBC) differential - 

16 20:41         

 

                          Blood leukocytes automated count (number/volume)      

     11.8 10*3/uL         

                                        4.3-11.0        

 

                          Blood erythrocytes automated count (number/volume)    

       4.27 10*6/uL       

                                        4.35-5.85        

 

                    Venous blood hemoglobin measurement (mass/volume)           

12.4 g/dL           

11.5-16.0        

 

                    Blood hematocrit (volume fraction)           37 %           

     35-52        

 

                    Automated erythrocyte mean corpuscular volume           87 [

foz_us]           

80-99        

 

                                        Automated erythrocyte mean corpuscular h

emoglobin (mass per erythrocyte)        

                          29 pg                     25-34        

 

                                        Automated erythrocyte mean corpuscular h

emoglobin concentration measurement 

(mass/volume)             33 g/dL                   32-36        

 

                    Automated erythrocyte distribution width ratio           13.

5 %              10.0-

14.5        

 

                    Automated blood platelet count (count/volume)           188 

10*3/uL           

130-400        

 

                          Automated blood platelet mean volume measurement      

     11.2 [foz_us]        

                                        7.4-10.4        

 

                    Automated blood neutrophils/100 leukocytes           66 %   

             42-75       

 

 

                    Automated blood lymphocytes/100 leukocytes           22 %   

             12-44       

 

 

                    Blood monocytes/100 leukocytes           7 %                

 0-12        

 

                    Automated blood eosinophils/100 leukocytes           5 %    

             0-10        

 

                    Automated blood basophils/100 leukocytes           0 %      

           0-10        

 

                    Blood neutrophils automated count (number/volume)           

7.8 10*3            

1.8-7.8        

 

                    Blood lymphocytes automated count (number/volume)           

2.7 10*3            

1.0-4.0        

 

                    Blood monocytes automated count (number/volume)           0.

9 10*3            

0.0-1.0        

 

                    Automated eosinophil count           0.5 10*3/uL           0

.0-0.3        

 

                    Automated blood basophil count (count/volume)           0.0 

10*3/uL           

0.0-0.1        

 

                                        Serum or plasma choriogonadotropin measu

rement (units/volume) - 16 20:41  

       

 

                          Serum or plasma choriogonadotropin measurement (units/

volume)           [id number] 

m[iU]/mL                                <5        

 

                                        Bacteria identification in genital speci

men by aerobe culture - 16 21:00  

       

 

                    QUANTITY OF GROWTH           Moderate Growth            NRG 

       

 

                          Bacteria identification in genital specimen by aerobe 

culture           [id number]

                                        NRG        

 

                                        Microscopic examination by JYOTI preparati

on - 16 21:00         

 

                    Microscopic examination by KOH preparation           TNP    

             NRG        

 

                                        Microscopic examination by wet preparati

on - 16 21:00         

 

                    WET PREP RESULTS           9/15/16 14:45 ST            NRG  

      

 

                                        Chlamydia trachomatis DNA detection by p

robe and signal amplification method - 

16 21:00         

 

                                        Chlamydia trachomatis DNA detection by p

robe and target amplification method    

                          Negative                  Negative        

 

                                        Neisseria gonorrhoeae DNA detection by p

robe and signal amplification method - 

16 21:00         

 

                    Gonorrhea amp DNA-urine           Negative            Negati

ve        

 

                                        Complete urinalysis with reflex to cultu

re - 17 01:00         

 

                    Urine color determination           YELLOW              NRG 

       

 

                    Urine clarity determination           CLEAR               NR

G        

 

                    Urine pH measurement by test strip           6              

     5-9        

 

                    Specific gravity of urine by test strip           1.010     

          1.016-1.022  

      

 

                          Urine protein assay by test strip, semi-quantitative  

         NEGATIVE         

                                        NEGATIVE        

 

                    Urine glucose detection by automated test strip           NE

GATIVE            

NEGATIVE        

 

                          Erythrocytes detection in urine sediment by light micr

oscopy           NEGATIVE 

                                        NEGATIVE        

 

                    Urine ketones detection by automated test strip           NE

GATIVE            

NEGATIVE        

 

                    Urine nitrite detection by test strip           NEGATIVE    

        NEGATIVE    

    

 

                    Urine total bilirubin detection by test strip           NEGA

TIVE            

NEGATIVE        

 

                          Urine urobilinogen measurement by automated test strip

 (mass/volume)           

NORMAL                                  NORMAL        

 

                    Urine leukocyte esterase detection by dipstick           NEG

ATIVE            

NEGATIVE        

 

                                        Automated urine sediment erythrocyte cou

nt by microscopy (number/high power 

field)                    NONE                      NRG        

 

                                        Automated urine sediment leukocyte count

 by microscopy (number/high power field)

                          NONE                      NRG        

 

                          Bacteria detection in urine sediment by light microsco

py           NEGATIVE     

                                        NRG        

 

                                        Squamous epithelial cells detection in u

rine sediment by light microscopy       

                          2-5                       NRG        

 

                          Crystals detection in urine sediment by light microsco

py           NONE         

                                        NRG        

 

                    Casts detection in urine sediment by light microscopy       

    NONE                

NRG        

 

                          Mucus detection in urine sediment by light microscopy 

          NEGATIVE        

                                        NRG        

 

                    Complete urinalysis with reflex to culture           NO     

             NRG        

 

                                        Complete blood count (CBC) with automate

d white blood cell (WBC) differential - 

17 20:55         

 

                          Blood leukocytes automated count (number/volume)      

     12.3 10*3/uL         

                                        4.3-11.0        

 

                          Blood erythrocytes automated count (number/volume)    

       4.06 10*6/uL       

                                        4.35-5.85        

 

                    Venous blood hemoglobin measurement (mass/volume)           

10.7 g/dL           

11.5-16.0        

 

                    Blood hematocrit (volume fraction)           34 %           

     35-52        

 

                    Automated erythrocyte mean corpuscular volume           83 [

foz_us]           

80-99        

 

                                        Automated erythrocyte mean corpuscular h

emoglobin (mass per erythrocyte)        

                          26 pg                     25-34        

 

                                        Automated erythrocyte mean corpuscular h

emoglobin concentration measurement 

(mass/volume)             32 g/dL                   32-36        

 

                    Automated erythrocyte distribution width ratio           14.

0 %              10.0-

14.5        

 

                    Automated blood platelet count (count/volume)           209 

10*3/uL           

130-400        

 

                          Automated blood platelet mean volume measurement      

     12.3 [foz_us]        

                                        7.4-10.4        

 

                    Automated blood neutrophils/100 leukocytes           71 %   

             42-75       

 

 

                    Automated blood lymphocytes/100 leukocytes           18 %   

             12-44       

 

 

                    Blood monocytes/100 leukocytes           9 %                

 0-12        

 

                    Automated blood eosinophils/100 leukocytes           2 %    

             0-10        

 

                    Automated blood basophils/100 leukocytes           0 %      

           0-10        

 

                    Blood neutrophils automated count (number/volume)           

8.7 10*3            

1.8-7.8        

 

                    Blood lymphocytes automated count (number/volume)           

2.2 10*3            

1.0-4.0        

 

                    Blood monocytes automated count (number/volume)           1.

1 10*3            

0.0-1.0        

 

                    Automated eosinophil count           0.3 10*3/uL           0

.0-0.3        

 

                    Automated blood basophil count (count/volume)           0.0 

10*3/uL           

0.0-0.1        

 

                                        Blood type T Indirect antibody screen pa

[facility] - 17 20:55         

 

                    ABO+Rh group           AP                  NRG        

 

                    Transfusion band number           [id number]             NRG   

     

 

                    Blood group antibody screen           NEGATIVE            NR

G        

 

                                        Complete blood count (CBC) with automate

d white blood cell (WBC) differential - 

17 07:55         

 

                          Blood leukocytes automated count (number/volume)      

     11.1 10*3/uL         

                                        4.3-11.0        

 

                          Blood erythrocytes automated count (number/volume)    

       3.62 10*6/uL       

                                        4.35-5.85        

 

                    Venous blood hemoglobin measurement (mass/volume)           

9.4 g/dL            

11.5-16.0        

 

                    Blood hematocrit (volume fraction)           31 %           

     35-52        

 

                    Automated erythrocyte mean corpuscular volume           85 [

foz_us]           

80-99        

 

                                        Automated erythrocyte mean corpuscular h

emoglobin (mass per erythrocyte)        

                          26 pg                     25-34        

 

                                        Automated erythrocyte mean corpuscular h

emoglobin concentration measurement 

(mass/volume)             31 g/dL                   32-36        

 

                    Automated erythrocyte distribution width ratio           14.

3 %              10.0-

14.5        

 

                    Automated blood platelet count (count/volume)           158 

10*3/uL           

130-400        

 

                          Automated blood platelet mean volume measurement      

     11.8 [foz_us]        

                                        7.4-10.4        

 

                    Automated blood neutrophils/100 leukocytes           67 %   

             42-75       

 

 

                    Automated blood lymphocytes/100 leukocytes           23 %   

             12-44       

 

 

                    Blood monocytes/100 leukocytes           8 %                

 0-12        

 

                    Automated blood eosinophils/100 leukocytes           2 %    

             0-10        

 

                    Automated blood basophils/100 leukocytes           0 %      

           0-10        

 

                    Blood neutrophils automated count (number/volume)           

7.4 10*3            

1.8-7.8        

 

                    Blood lymphocytes automated count (number/volume)           

2.5 10*3            

1.0-4.0        

 

                    Blood monocytes automated count (number/volume)           0.

9 10*3            

0.0-1.0        

 

                    Automated eosinophil count           0.2 10*3/uL           0

.0-0.3        

 

                    Automated blood basophil count (count/volume)           0.0 

10*3/uL           

0.0-0.1        

 

                                        [facility] - 19 09:22         

 

                    GLUCOSE             83 mg/dL            65-99        

 

                    UREA NITROGEN (BUN)           10 mg/dL            7-25      

  

 

                    CREATININE           0.64 mg/dL           0.50-1.10        

 

                    eGFR NON-AFR. AMERICAN           121 mL/min/1.73m2          

 > OR = 60        

 

                    eGFR            140 mL/min/1.73m2           

> OR = 60        

 

                    BUN/CREATININE RATIO           NOT APPLICABLE (calc)        

   6-22        

 

                    SODIUM              140 mmol/L           135-146        

 

                    POTASSIUM           4.1 mmol/L           3.5-5.3        

 

                    CHLORIDE            106 mmol/L                   

 

                    CARBON DIOXIDE           26 mmol/L           20-32        

 

                    CALCIUM             9.0 mg/dL           8.6-10.2        

 

                    PROTEIN, TOTAL           6.8 g/dL            6.1-8.1        

 

                    ALBUMIN             4.3 g/dL            3.6-5.1        

 

                    GLOBULIN            2.5 g/dL (calc)           1.9-3.7       

 

 

                    ALBUMIN/GLOBULIN RATIO           1.7 (calc)           1.0-2.

5        

 

                    BILIRUBIN, TOTAL           0.5 mg/dL           0.2-1.2      

  

 

                    ALKALINE PHOSPHATASE           71 U/L                 

     

 

                    AST                 12 U/L              10-30        

 

                    ALT                 13 U/L              6-29        

 

                                        VITAMIN D, 25-H - 19 12:57        

 

 

                    VITAMIN D,25-OH,TOTAL,IA           38 ng/mL            30-10

0        

 

                                        VITAMIN B12 - 19 12:59         

 

                    VITAMIN B12           665 pg/mL           200-1100        

 

                                        Complete blood count (CBC) with automate

d white blood cell (WBC) differential - 

20 00:08         

 

                          Blood leukocytes automated count (number/volume)      

     10.8 10*3/uL         

                                        4.3-11.0        

 

                          Blood erythrocytes automated count (number/volume)    

       4.58 10*6/uL       

                                        4.35-5.85        

 

                    Venous blood hemoglobin measurement (mass/volume)           

12.9 g/dL           

11.5-16.0        

 

                    Blood hematocrit (volume fraction)           40 %           

     35-52        

 

                    Automated erythrocyte mean corpuscular volume           87 [

foz_us]           

80-99        

 

                                        Automated erythrocyte mean corpuscular h

emoglobin (mass per erythrocyte)        

                          28 pg                     25-34        

 

                                        Automated erythrocyte mean corpuscular h

emoglobin concentration measurement 

(mass/volume)             32 g/dL                   32-36        

 

                    Automated erythrocyte distribution width ratio           14.

9 %              10.0-

14.5        

 

                    Automated blood platelet count (count/volume)           216 

10*3/uL           

130-400        

 

                          Automated blood platelet mean volume measurement      

     11.7 [foz_us]        

                                        7.4-10.4        

 

                    Automated blood neutrophils/100 leukocytes           60 %   

             42-75       

 

 

                    Automated blood lymphocytes/100 leukocytes           31 %   

             12-44       

 

 

                    Blood monocytes/100 leukocytes           6 %                

 0-12        

 

                    Automated blood eosinophils/100 leukocytes           3 %    

             0-10        

 

                    Automated blood basophils/100 leukocytes           0 %      

           0-10        

 

                    Blood neutrophils automated count (number/volume)           

6.5 10*3            

1.8-7.8        

 

                    Blood lymphocytes automated count (number/volume)           

3.4 10*3            

1.0-4.0        

 

                    Blood monocytes automated count (number/volume)           0.

6 10*3            

0.0-1.0        

 

                    Automated eosinophil count           0.3 10*3/uL           0

.0-0.3        

 

                    Automated blood basophil count (count/volume)           0.0 

10*3/uL           

0.0-0.1        

 

                                        Complete urinalysis with reflex to cultu

re - 20 00:08         

 

                    Urine color determination           DARK YELLOW            N

RG        

 

                    Urine clarity determination           SL CLOUDY            N

RG        

 

                    Urine pH measurement by test strip           6.5            

     5-9        

 

                    Specific gravity of urine by test strip           1.025     

          1.016-1.022  

      

 

                          Urine protein assay by test strip, semi-quantitative  

         NEGATIVE         

                                        NEGATIVE        

 

                    Urine glucose detection by automated test strip           NE

GATIVE            

NEGATIVE        

 

                          Erythrocytes detection in urine sediment by light micr

oscopy           NEGATIVE 

                                        NEGATIVE        

 

                    Urine ketones detection by automated test strip           NE

GATIVE            

NEGATIVE        

 

                    Urine nitrite detection by test strip           POSITIVE    

        NEGATIVE    

    

 

                    Urine total bilirubin detection by test strip           NEGA

TIVE            

NEGATIVE        

 

                          Urine urobilinogen measurement by automated test strip

 (mass/volume)           

1.0 mg/dL                               < = 1.0        

 

                    Urine leukocyte esterase detection by dipstick           NEG

ATIVE            

NEGATIVE        

 

                                        Automated urine sediment erythrocyte cou

nt by microscopy (number/high power 

field)                     [HPF]                    NRG        

 

                                        Automated urine sediment leukocyte count

 by microscopy (number/high power field)

                           [HPF]                    NRG        

 

                          Bacteria detection in urine sediment by light microsco

py           LARGE        

                                        NRG        

 

                                        Squamous epithelial cells detection in u

rine sediment by light microscopy       

                          0-2                       NRG        

 

                          Crystals detection in urine sediment by light microsco

py           NONE         

                                        NRG        

 

                    Casts detection in urine sediment by light microscopy       

    NONE                

NRG        

 

                          Mucus detection in urine sediment by light microscopy 

          NEGATIVE        

                                        NRG        

 

                    Complete urinalysis with reflex to culture           YES    

             NRG        

 

                                        Fibrin D-dimer FEU measurement in platel

et poor plasma (mass/volume) - 20 

00:08         

 

                          Fibrin D-dimer FEU measurement in platelet poor plasma

 (mass/volume)           

0.31 ug/mL                              0.00-0.49        

 

                                        Comprehensive metabolic panel - 20

 00:08         

 

                          Serum or plasma sodium measurement (moles/volume)     

      140 mmol/L          

                                        135-145        

 

                          Serum or plasma potassium measurement (moles/volume)  

         3.8 mmol/L       

                                        3.6-5.0        

 

                          Serum or plasma chloride measurement (moles/volume)   

        108 mmol/L        

                                                

 

                    Carbon dioxide           21 mmol/L           21-32        

 

                          Serum or plasma anion gap determination (moles/volume)

           11 mmol/L      

                                        5-14        

 

                          Serum or plasma urea nitrogen measurement (mass/volume

)           11 mg/dL      

                                        7-18        

 

                          Serum or plasma creatinine measurement (mass/volume)  

         0.83 mg/dL       

                                        0.60-1.30        

 

                    Serum or plasma urea nitrogen/creatinine mass ratio         

  13                  NRG 

       

 

                                        Serum or plasma creatinine measurement w

ith calculation of estimated glomerular 

filtration rate           >                         NRG        

 

                    Serum or plasma glucose measurement (mass/volume)           

93 mg/dL            

        

 

                    Serum or plasma calcium measurement (mass/volume)           

8.8 mg/dL           

8.5-10.1        

 

                          Serum or plasma total bilirubin measurement (mass/volu

me)           0.4 mg/dL   

                                        0.1-1.0        

 

                                        Serum or plasma alkaline phosphatase david

surement (enzymatic activity/volume)    

                          66 U/L                            

 

                                        Serum or plasma aspartate aminotransfera

se measurement (enzymatic 

activity/volume)           13 U/L                    5-34        

 

                                        Serum or plasma alanine aminotransferase

 measurement (enzymatic activity/volume)

                          16 U/L                    0-55        

 

                    Serum or plasma protein measurement (mass/volume)           

7.2 g/dL            

6.4-8.2        

 

                    Serum or plasma albumin measurement (mass/volume)           

4.3 g/dL            

3.2-4.5        

 

                    CALCIUM CORRECTED           8.6 mg/dL           8.5-10.1    

    

 

                                        Magnesium - 20 00:08         

 

                    Magnesium           2.0 mg/dL           1.6-2.4        

 

                                        Serum ragweed IgE antibody assay -  00:08         

 

                    Serum ragweed IgE antibody assay           121 U/L          

   125-220        

 

                                        Serum or plasma C reactive protein measu

rement (mass/volume) - 20 00:08   

      

 

                          Serum or plasma C reactive protein measurement (mass/v

olume)           2.84 

mg/dL                                   0.00-0.50        

 

                                        Erythrocyte sedimentation rate by nelly

gren method - 20 00:08         

 

                    Erythrocyte sedimentation rate by westergren method         

  6 mm                0-

20        

 

                                        Bacterial urine culture - 20 00:08

         

 

                    Bacterial urine culture           [id number]            NRG  

      

 

                    COLONY COUNT           >100,000/ML            NRG        

 

                    SUSCEPTIBILITY           SUSCEPTIBILITY REPORTED 20 10:3

5            NRG    

    

 

                    RAPID ID            PRELIM RAPID ID BY [facility] 20, 1215.    

        NRG        

 

                    ID CONFIRMATION           ID CONFIRMED [id number]          

  NRG        

 

                                        Dirithromycin susceptibility test by dis

k diffusion - 20 00:08         

 

                          Gentamicin susceptibility test by minimum inhibitory c

oncentration           <= 

                                        NRG        

 

                                        Trimethoprim/sulfamethoxazole susceptibi

lity test by minimum 

inhibitoryconcentration           <=                        NRG        

 

                          Levofloxacin susceptibility test by minimum inhibitory

 concentration           

<=                                      NRG        

 

                          Ampicillin susceptibility test by minimum inhibitory c

oncentration           S  

                                        NRG        

 

                          Cefazolin susceptibility test by minimum inhibitory co

ncentration           <=  

                                        NRG        

 

                          Ceftriaxone susceptibility test by minimum inhibitory 

concentration           <=

                                        NRG        

 

                          Ciprofloxacin susceptibility test by minimum inhibitor

y concentration           

<=                                      NRG        

 

                          Meropenem susceptibility test by minimum inhibitory co

ncentration           <=  

                                        NRG        

 

                                        Nitrofurantoin susceptibility test by mi

nimum inhibitory concentration          

                          <=                        NRG        

 

                    Amoxicillin and clavulanate potassium susc RICK           <= 

                 NRG      

  

 

                                        Streptococcus pyogenes antigen detection

 - 20 00:12         

 

                    Streptococcus pyogenes antigen detection           NEGATIVE 

           NEGATIVE 

       

 

                                        Influenza virus A and B antigen detectio

n - 20 00:12         

 

                    FLU RESULT           NEGATIVE FOR INFLUENZA A AND B ANTIGENS

 BY IA            

NRG        

 

                                        Bacterial throat culture - 20 00:1

2         

 

                    Bacterial throat culture           [id number]            NRG  

      

 

                    FREE TEXT EXTERNAL           PLUS NORMAL KRISTINA            NR

G        

 

                    QUANTITY OF GROWTH           Isolated            NRG        

 

                                        Complete blood count (CBC) with automate

d white blood cell (WBC) differential - 

04/15/20 20:50         

 

                          Blood leukocytes automated count (number/volume)      

     10.5 10*3/uL         

                                        4.3-11.0        

 

                          Blood erythrocytes automated count (number/volume)    

       5.11 10*6/uL       

                                        4.35-5.85        

 

                    Venous blood hemoglobin measurement (mass/volume)           

14.4 g/dL           

11.5-16.0        

 

                    Blood hematocrit (volume fraction)           45 %           

     35-52        

 

                    Automated erythrocyte mean corpuscular volume           88 [

foz_us]           

80-99        

 

                                        Automated erythrocyte mean corpuscular h

emoglobin (mass per erythrocyte)        

                          28 pg                     25-34        

 

                                        Automated erythrocyte mean corpuscular h

emoglobin concentration measurement 

(mass/volume)             32 g/dL                   32-36        

 

                    Automated erythrocyte distribution width ratio           14.

7 %              10.0-

14.5        

 

                    Automated blood platelet count (count/volume)           224 

10*3/uL           

130-400        

 

                          Automated blood platelet mean volume measurement      

     11.6 [foz_us]        

                                        7.4-10.4        

 

                    Automated blood neutrophils/100 leukocytes           61 %   

             42-75       

 

 

                    Automated blood lymphocytes/100 leukocytes           29 %   

             12-44       

 

 

                    Blood monocytes/100 leukocytes           6 %                

 0-12        

 

                    Automated blood eosinophils/100 leukocytes           4 %    

             0-10        

 

                    Automated blood basophils/100 leukocytes           0 %      

           0-10        

 

                    Blood neutrophils automated count (number/volume)           

6.4 10*3            

1.8-7.8        

 

                    Blood lymphocytes automated count (number/volume)           

3.0 10*3            

1.0-4.0        

 

                    Blood monocytes automated count (number/volume)           0.

6 10*3            

0.0-1.0        

 

                    Automated eosinophil count           0.4 10*3/uL           0

.0-0.3        

 

                    Automated blood basophil count (count/volume)           0.0 

10*3/uL           

0.0-0.1        

 

                                        Comprehensive metabolic panel - 04/15/20

 20:50         

 

                          Serum or plasma sodium measurement (moles/volume)     

      142 mmol/L          

                                        135-145        

 

                          Serum or plasma potassium measurement (moles/volume)  

         3.5 mmol/L       

                                        3.6-5.0        

 

                          Serum or plasma chloride measurement (moles/volume)   

        108 mmol/L        

                                                

 

                    Carbon dioxide           21 mmol/L           21-32        

 

                          Serum or plasma anion gap determination (moles/volume)

           13 mmol/L      

                                        5-14        

 

                          Serum or plasma urea nitrogen measurement (mass/volume

)           10 mg/dL      

                                        7-18        

 

                          Serum or plasma creatinine measurement (mass/volume)  

         0.75 mg/dL       

                                        0.60-1.30        

 

                    Serum or plasma urea nitrogen/creatinine mass ratio         

  13                  NRG 

       

 

                                        Serum or plasma creatinine measurement w

ith calculation of estimated glomerular 

filtration rate           >                         NRG        

 

                    Serum or plasma glucose measurement (mass/volume)           

80 mg/dL            

        

 

                    Serum or plasma calcium measurement (mass/volume)           

9.6 mg/dL           

8.5-10.1        

 

                          Serum or plasma total bilirubin measurement (mass/volu

me)           0.2 mg/dL   

                                        0.1-1.0        

 

                                        Serum or plasma alkaline phosphatase david

surement (enzymatic activity/volume)    

                          77 U/L                            

 

                                        Serum or plasma aspartate aminotransfera

se measurement (enzymatic 

activity/volume)           17 U/L                    5-34        

 

                                        Serum or plasma alanine aminotransferase

 measurement (enzymatic activity/volume)

                          13 U/L                    0-55        

 

                    Serum or plasma protein measurement (mass/volume)           

7.8 g/dL            

6.4-8.2        

 

                    Serum or plasma albumin measurement (mass/volume)           

4.5 g/dL            

3.2-4.5        

 

                    CALCIUM CORRECTED           9.2 mg/dL           8.5-10.1    

    

 

                                        Serum or plasma choriogonadotropin (preg

rikki test) detection - 04/15/20 20:50  

       

 

                          Serum or plasma choriogonadotropin (pregnancy test) de

tection           NEGATIVE

                                        NEGATIVE        

 

                                        Lipase - 04/15/20 20:50         

 

                    Lipase              29 U/L              8-78        

 

                                        Serum or plasma C reactive protein measu

rement (mass/volume) - 04/15/20 20:50   

      

 

                          Serum or plasma C reactive protein measurement (mass/v

olume)           0.89 

mg/dL                                   0.00-0.50        

 

                                        Complete urinalysis with reflex to cultu

re - 04/15/20 20:55         

 

                    Urine color determination           YELLOW              NRG 

       

 

                    Urine clarity determination           SL CLOUDY            N

RG        

 

                    Urine pH measurement by test strip           7.0            

     5-9        

 

                    Specific gravity of urine by test strip           1.020     

          1.016-1.022  

      

 

                          Urine protein assay by test strip, semi-quantitative  

         NEGATIVE         

                                        NEGATIVE        

 

                    Urine glucose detection by automated test strip           NE

GATIVE            

NEGATIVE        

 

                          Erythrocytes detection in urine sediment by light micr

oscopy           NEGATIVE 

                                        NEGATIVE        

 

                    Urine ketones detection by automated test strip           NE

GATIVE            

NEGATIVE        

 

                    Urine nitrite detection by test strip           NEGATIVE    

        NEGATIVE    

    

 

                    Urine total bilirubin detection by test strip           NEGA

TIVE            

NEGATIVE        

 

                          Urine urobilinogen measurement by automated test strip

 (mass/volume)           

0.2 mg/dL                               < = 1.0        

 

                    Urine leukocyte esterase detection by dipstick           NEG

ATIVE            

NEGATIVE        

 

                                        Automated urine sediment erythrocyte cou

nt by microscopy (number/high power 

field)                     [HPF]                    NRG        

 

                                        Automated urine sediment leukocyte count

 by microscopy (number/high power field)

                           [HPF]                    NRG        

 

                          Bacteria detection in urine sediment by light microsco

py           TRACE        

                                        NRG        

 

                                        Squamous epithelial cells detection in u

rine sediment by light microscopy       

                          5-10                      NRG        

 

                          Crystals detection in urine sediment by light microsco

py           PRESENT      

                                        NRG        

 

                    Casts detection in urine sediment by light microscopy       

    NONE                

NRG        

 

                          Mucus detection in urine sediment by light microscopy 

          NEGATIVE        

                                        NRG        

 

                    Complete urinalysis with reflex to culture           NO     

             NRG        

 

                          Amorphous sediment detection in urine sediment by ligh

t microscopy           

LARGE MARLYN URATES                       NRG        



                                                                                
          



Encounters

      



                ACCT No.           Visit Date/Time           Discharge          

 Status         

             Pt. Type           Provider           Facility           Loc./Unit 

          

Complaint        

 

                [id number]           2019 15:35:00           2019 23:59:5

9           CLS 

                Outpatient           ABRIL ALVARADO                           [facility] 

WALK IN CARE                                     

 

             [id number]           2019 12:20:00                              

       Document

 Registration                                                                   

 

 

             [id number]           2019 09:20:00                              

       Document

 Registration                                                                   

 

 

                    [id number]           2020 08:24:00            23:59:59        

                CLS             Outpatient           SHERINE PIERRE MD        

   [facility]           CARD                      SOB        

 

                    [id number]           04/15/2020 20:54:00           04/15/2

020 21:51:00        

                DIS             Emergency           EVAN ZACARIAS         

  [facility]           ER                        SOB/LIGHTHEADEDNESS    

    

 

                    [id number]           2020 23:48:00            01:11:00        

                DIS             Emergency           JEREMI MARTIN MD      

     [facility] 

[facility]           ER                        SOB/HOT,COLD   

     

 

                    [id number]           2019 12:09:00           

019 13:30:00        

                DIS             Emergency           DEREK DOMINGO           [facility]ia

 [facility]           ER                        R KNEE LAC - DIZZY     

   

 

                    [id number]           2017 20:15:00           

017 11:50:00        

                DIS             Inpatient           TEE SHEEHAN, MARCELO COSBY          

 [facility]           LDRP                      INDUCTION        

 

                    [id number]           2017 00:43:00           

017 03:50:00        

                DIS             Outpatient           CHARLINESCOTT  KAY S       

    [facility]           WSo                       CONTRACTIONS,DISCHARGE

        

 

                    [id number]           2017 16:07:00           

017 18:15:00        

                DIS             Outpatient           TEE SHEEHAN, MARCELO COSBY         

  [facility]           WSo                       PRESSURE, BACK PAIN    

    

 

                    [id number]           2017 16:06:00           

017 19:30:00        

                DIS             Outpatient           MARCELO OLIVEIRA MD         

  [facility]           WSo                       ABD CRAMPING        

 

                    [id number]           2016 19:38:00           

016 22:35:00        

                DIS             Emergency           FELIX SHEEHAN, ANGEL ARELLANO    

       [facility] 

[facility]           ER                        CRAMPING;10 WEE

KS PREGNANT  

      

 

                    [id number]           2016 19:08:00           

016 20:16:00        

                DIS             Emergency           EVAN ZACARIAS APRHARJEET         

  [facility]           ER                        8 WKS PREG/FEVER/SINUS 

ISSUES/DIZZINESS        

 

                    [id number]           08/15/2016 20:19:00           08/15/2

016 21:22:00        

                DIS             Emergency           FELIX SHEEHAN, ANGEL ARELLANO    

       [facility] 

[facility]           ER                        7 WEEKS PREG/LO

W BACK PAIN  

      

 

                    [id number]           2016 13:58:00           

016 15:25:00        

                DIS             Emergency           EAVN ZACARIAS APRHARJEET         

  [facility]           ER                        IUD MOVED/POSS PREG    

    

 

             [id number]           2008 17:30:00                         

            

Document Registration

## 2020-05-02 VITALS — DIASTOLIC BLOOD PRESSURE: 63 MMHG | SYSTOLIC BLOOD PRESSURE: 110 MMHG

## 2020-05-02 NOTE — ED CHEST PAIN
General


Chief Complaint:  Chest Pain


Stated Complaint:  L ARM PAIN,CHEST PAIN,BACK PAIN


Nursing Triage Note:  


Patient states she has been experiencing chest pain intermittently throughout 


the day however it has become progressively worse. She advises she was working 


when the pain and shortness of breath became worse. Patient rates pain at an 


6/10.


Nursing Sepsis Screen:  No Definite Risk


Source:  patient


Exam Limitations:  no limitations





History of Present Illness


Date Seen by Provider:  May 1, 2020


Time Seen by Provider:  23:10


Initial Comments


This 29-year-old young lady presents to the emergency room with complaints of 

chest pain and left upper back pain that is worse with inspiration and moving.  

She has had this pain intermittently for a couple of months but it became 

abruptly worse around 2100.  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  

and has to move bags of dog food.  Pain radiates into her left arm and neck.  

Chest pain has been intermittent all day.  She describes it as a tightness.  She

has felt dizzy and short of air because of splinting with breathing.  She smokes

about 4 cigarettes a day.  Pain is reproducible with palpation of the chest and 

left upper back.





Allergies and Home Medications


Allergies


Coded Allergies:  


     No Known Drug Allergies (Verified , 4/15/20)





Home Medications


Acetaminophen with Codeine 1 Each Tablet, 1-2 TAB PO Q6H PRN for Pain


   Prescribed by: MARCELO OLIVEIRA on 4/11/17 0827


Cephalexin 500 Mg Tablet, 500 MG PO BID


   Prescribed by: JEREMI MARTIN on 4/5/20 0101


Cyclobenzaprine HCl 10 Mg Tablet, 10 MG PO Q8H PRN for SPASMS


   Prescribed by: ANGEL GARCIA on 5/2/20 0222


Docusate Sodium 100 Mg Capsule, 100 MG PO BID PRN for CONSTIPATION-1ST LINE


   Prescribed by: MARCELO OLIVEIRA on 4/10/17 0944


Ferrous Sulfate 325 Mg Tablet, 325 MG PO DAILY


   Prescribed by: MARCELO OLIVEIRA on 4/10/17 0945


Ibuprofen 600 Mg Tablet, 600 MG PO Q6H


   Prescribed by: MARCELO OLIVEIRA on 4/10/17 0944


Omeprazole 20 Mg Tablet.dr, 20 MG PO DAILY, (Reported)


Prenatal Vit/Iron Fumarate/FA 1 Each Tablet, 1 EACH PO DAILY, (Reported)





Patient Home Medication List


Home Medication List Reviewed:  Yes





Review of Systems


Review of Systems


Constitutional:  no symptoms reported


EENTM:  No Symptoms Reported


Respiratory:  See HPI


Cardiovascular:  See HPI


Gastrointestinal:  No Symptoms Reported


Genitourinary:  No Symptoms Reported


Musculoskeletal:  see HPI


Skin:  no symptoms reported


Psychiatric/Neurological:  No Symptoms Reported


Endocrine:  No Symptoms Reported


Hematologic/Lymphatic:  No Symptoms Reported





Past Medical-Social-Family Hx


Past Med/Social Hx:  Reviewed Nursing Past Med/Soc Hx


Patient Social History


Alcohol Use:  Denies Use


Recreational Drug Use:  No


Type Used:  Cigarettes


Former Smoker, Quit:  Aug 1, 2016


2nd Hand Smoke Exposure:  No


Recent Foreign Travel:  No


Contact w/Someone Who Travel:  No


Recent Infectious Disease Expo:  No


Recent Hopitalizations:  No





Immunizations Up To Date


Tetanus Booster (TDap):  Less than 5yrs


PED Vaccines UTD:  No


Date of Influenza Vaccine:  Oct 15, 2019





Seasonal Allergies


Seasonal Allergies:  No





Past Medical History


Surgeries:  Yes


Appendectomy, Cystectomy, Gallbladder, Tubal Ligation


Respiratory:  No


Currently Using CPAP:  No


Currently Using BIPAP:  No


Cardiac:  No


Neurological:  No


Reproductive Disorders:  No


Female Reproductive Disorders:  Ovarian Cyst


GYN History:  Tubal Ligation


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


HIV/AIDS:  No


Genitourinary:  No


Kidney Infection


Gastrointestinal:  No


Gall Bladder Disease


Musculoskeletal:  No


Endocrine:  No


HEENT:  No


Loss of Vision:  Denies


Hearing Impairment:  Denies


Cancer:  No


Psychosocial:  Yes


Anxiety, Depression


Integumentary:  No


Blood Disorders:  No


Adverse Reaction/Blood Tranf:  No





Family Medical History





Colon cancer


  GRANDFATHER


Diabetes mellitus


  19 MOTHER


  GRANDFATHER


  GRANDMOTHER


FH: breast cancer


  GRANDMOTHER


FH: depression


  19 FATHER


  19 MOTHER


FH: emphysema


  GRANDFATHER


FH: lung cancer


  GRANDMOTHER


FH: sleep apnea


  19 MOTHER


FH: stroke


  GRANDFATHER


Hepatitis C


  19 FATHER


Hypercholesterolemia


  19 MOTHER


  GRANDFATHER


  GRANDMOTHER


Hypertension


  19 FATHER


  19 MOTHER


  GRANDMOTHER


Myocardial infarction


  GRANDFATHER


Thyroid disease (HYPOTHYROIDISM)


  19 MOTHER


Heart Disease, Cancer, Diabetes, Hypertension





Physical Exam


Vital Signs





Vital Signs - First Documented








 5/1/20





 23:04


 


Pulse Ox 100


 


O2 Delivery Room Air





Capillary Refill : Less Than 3 Seconds


Height, Weight, BMI


Height: 5'6.50"


Weight: 225lbs. 0.0oz. 102.512328mn; 30.00 BMI


Method:Stated


General Appearance:  No Apparent Distress


HEENT:  PERRL/EOMI, Normal ENT Inspection


Neck:  Normal Inspection, Non Tender


Respiratory:  Lungs Clear, Normal Breath Sounds, No Accessory Muscle Use, No 

Respiratory Distress, Other (left upper chest wall tender to palpation)


Cardiovascular:  Regular Rate, Rhythm, No Edema, No Murmur, Normal Peripheral 

Pulses


Gastrointestinal:  Normal Bowel Sounds, Non Tender, Soft


Extremity:  Normal Inspection, Non Tender, No Calf Tenderness, No Pedal Edema, 

Other (negative Cristal.  Tenderness in the musculature of the left upper back)


Neurologic/Psychiatric:  Alert, Oriented x3, No Motor/Sensory Deficits, Normal 

Mood/Affect, CNs II-XII Norm as Tested


Skin:  Normal Color, Warm/Dry





Procedures/Interventions





   Suture Size:  4-0





Progress/Results/Core Measures


Results/Orders


Lab Results





Laboratory Tests








Test


 5/1/20


23:15 5/2/20


01:17 Range/Units


 


 


White Blood Count


 11.6 H


 


 4.3-11.0


10^3/uL


 


Red Blood Count


 4.98 


 


 4.35-5.85


10^6/uL


 


Hemoglobin 13.9   11.5-16.0  G/DL


 


Hematocrit 43   35-52  %


 


Mean Corpuscular Volume 86   80-99  FL


 


Mean Corpuscular Hemoglobin 28   25-34  PG


 


Mean Corpuscular Hemoglobin


Concent 33 


 


 32-36  G/DL





 


Red Cell Distribution Width 15.3 H  10.0-14.5  %


 


Platelet Count


 234 


 


 130-400


10^3/uL


 


Mean Platelet Volume 11.7 H  7.4-10.4  FL


 


Neutrophils (%) (Auto) 61   42-75  %


 


Lymphocytes (%) (Auto) 30   12-44  %


 


Monocytes (%) (Auto) 6   0-12  %


 


Eosinophils (%) (Auto) 3   0-10  %


 


Basophils (%) (Auto) 0   0-10  %


 


Neutrophils # (Auto) 7.1   1.8-7.8  X 10^3


 


Lymphocytes # (Auto) 3.5   1.0-4.0  X 10^3


 


Monocytes # (Auto) 0.7   0.0-1.0  X 10^3


 


Eosinophils # (Auto)


 0.4 H


 


 0.0-0.3


10^3/uL


 


Basophils # (Auto)


 0.0 


 


 0.0-0.1


10^3/uL


 


Prothrombin Time 14.2   12.2-14.7  SEC


 


INR Comment 1.1   0.8-1.4  


 


Activated Partial


Thromboplast Time 35 


 


 24-35  SEC





 


Sodium Level 139   135-145  MMOL/L


 


Potassium Level 3.7   3.6-5.0  MMOL/L


 


Chloride Level 105     MMOL/L


 


Carbon Dioxide Level 21   21-32  MMOL/L


 


Anion Gap 13   5-14  MMOL/L


 


Blood Urea Nitrogen 10   7-18  MG/DL


 


Creatinine


 0.88 


 


 0.60-1.30


MG/DL


 


Estimat Glomerular Filtration


Rate > 60 


 


  





 


BUN/Creatinine Ratio 11    


 


Glucose Level 119 H    MG/DL


 


Calcium Level 9.6   8.5-10.1  MG/DL


 


Corrected Calcium 9.3   8.5-10.1  MG/DL


 


Magnesium Level 1.9   1.6-2.4  MG/DL


 


Total Bilirubin 0.3   0.1-1.0  MG/DL


 


Aspartate Amino Transf


(AST/SGOT) 29 


 


 5-34  U/L





 


Alanine Aminotransferase


(ALT/SGPT) 16 


 


 0-55  U/L





 


Alkaline Phosphatase 67     U/L


 


Myoglobin


 189.3 H


 109.9 H


 10.0-92.0


NG/ML


 


Troponin I < 0.028  < 0.028  <0.028  NG/ML


 


C-Reactive Protein High


Sensitivity 1.18 H


 


 0.00-0.50


MG/DL


 


Total Protein 7.6   6.4-8.2  GM/DL


 


Albumin 4.4   3.2-4.5  GM/DL








My Orders





Orders - ANGEL WASHINGTON MD


Cbc With Automated Diff (5/1/20 23:13)


Magnesium (5/1/20 23:13)


Chest 1 View, Ap/Pa Only (5/1/20 23:13)


Ekg Tracing (5/1/20 23:13)


Comprehensive Metabolic Panel (5/1/20 23:13)


Myoglobin Serum (5/1/20 23:13)


Protime With Inr (5/1/20 23:13)


Partial Thromboplastin Time (5/1/20 23:13)


O2 (5/1/20 23:13)


Monitor-Rhythm Ecg Trace Only (5/1/20 23:13)


Ed Iv/Invasive Line Start (5/1/20 23:13)


Troponin I (5/1/20 23:13)


Ketorolac Injection (Toradol Injection) (5/1/20 23:15)


Orphenadrine Injection (Norflex Injectio (5/1/20 23:15)


Hs C Reactive Protein (5/2/20 00:00)


Myoglobin Serum (5/2/20 01:15)


Troponin I (5/2/20 01:15)


Lactated Ringers (Lr 1000 Ml Iv Solution (5/2/20 00:20)





Medications Given in ED





Current Medications








 Medications  Dose


 Ordered  Sig/Kristin


 Route  Start Time


 Stop Time Status Last Admin


Dose Admin


 


 Ketorolac


 Tromethamine  15 mg  ONCE  ONCE


 IVP  5/1/20 23:15


 5/1/20 23:16 DC 5/1/20 23:35


15 MG


 


 Lactated Ringer's  1,000 ml @ 


 0 mls/hr  Q0M ONCE


 IV  5/2/20 00:20


 5/2/20 00:21 DC 5/2/20 00:26


1,000 MLS/HR


 


 Orphenadrine


 Citrate  60 mg  ONCE  ONCE


 IV  5/1/20 23:15


 5/1/20 23:16 DC 5/1/20 23:35


60 MG








Vital Signs/I&O











 5/1/20 5/1/20 5/1/20 5/2/20





 23:04 23:05 23:05 02:39


 


Temp  36.0  


 


Pulse  85  80


 


Resp  14  16


 


B/P (MAP)  121/80 (94)  110/63


 


Pulse Ox 100 99  98


 


O2 Delivery Room Air Room Air Nasal Cannula Room Air














Blood Pressure Mean:                    94











Progress


Progress Note :  


Progress Note


Pain improved with Toradol and Norflex.  Myoglobin was a bit elevated.  2 hour 

troponin and myoglobin were obtained.  There is no increase.  She was ultimately

discharged home.


Initial ECG Impression Date:  May 1, 2020


Initial ECG Impression Time:  23:04


Initial ECG Rate:  81


Initial ECG Rhythm:  Normal Sinus


Initial ECG Intervals:  Normal


Initial ECG Impression:  Normal


Comment


Normal sinus rhythm with no ST elevation or depression.  No abnormal intervals 

or axis deviation.





Diagnostic Imaging





   Diagonstic Imaging:  Xray


   Plain Films/CT/US/NM/MRI:  chest


Comments


Chest x-ray viewed by me.  Report not available.  No acute abnormalities 

appreciated.





Departure


Impression





   Primary Impression:  


   Chest wall pain


   Additional Impression:  


   Upper back pain on left side


Disposition:  01 HOME, SELF-CARE


Condition:  Improved





Departure-Patient Inst.


Decision time for Depature:  02:21


Referrals:  


NO,LOCAL PHYSICIAN (PCP/Family)


Primary Care Physician


Patient Instructions:  Chest Pain That Is Not Caused by the Heart (DC)





Add. Discharge Instructions:  


Drink plenty of clear liquids to stay well-hydrated.


For pain you may take ibuprofen up to 600 mg every 6 hours as needed.  Add 

Tylenol (acetaminophen) up to 1000 mg every 6 hours as needed for more pain 

relief.


Follow-up with your primary care provider soon as possible.


You may take cyclobenzaprine as prescribed for muscle tension or spasms.


Return to care if you have worsening symptoms or new symptoms that need urgent 

attention.





All discharge instructions reviewed with patient and/or family. Voiced 

understanding.


Scripts


Cyclobenzaprine HCl (Cyclobenzaprine HCl) 10 Mg Tablet


10 MG PO Q8H PRN for SPASMS, #10 TAB 0 Refills


   Prov: ANGEL WASHINGTON MD         5/2/20











ANGEL WASHINGTON MD         May 2, 2020 02:23

## 2020-05-02 NOTE — DIAGNOSTIC IMAGING REPORT
EXAMINATION: Portable erect AP chest at 1125 PM



INDICATION: Chest pain



There are no prior studies available for comparison.



The heart size is within normal limits. The lungs are clear.

There is no evidence for failure, pneumonia or for a pleural

effusion. The mediastinum is not widened. The osseous structures

are intact.



There is a metallic instrument overlying the left hilum.

Correlation with the patient's history would be recommended.

External cardiac monitoring electrodes were also noted.



IMPRESSION:

1. There is no evidence for an acute cardiopulmonary abnormality.

2. There is a metallic device overlying the left hilum. This is

of uncertain etiology.



Dictated by: 



  Dictated on workstation # [id number]

## 2020-05-07 NOTE — CARDIOLOGY TILT TABLE TEST
Cardiology-Tilt Table Test


Tilt Table Test


Date


5/7/20


Baseline Vitals


Baseline heart rate of 66 bpm and blood pressure 116/73 mmHg.  During supine 

position.


Patient was tilted to 75 degrees for [10] minutes, then returned to supine 

position, given [2] sublingual nitroglycerin tablets, then tilted again to 75 

degrees for [15] minutes. 





During test, patient was:  asymptomatic


In Conclusion;:  Positive Tilt Table Test (orthostatic hypotension without 

syncope)


Patient heart rate varied between 89 and 116 BPM.  When her heart rate decreased

 from 116 BPM to 89 bpm she would feel some dizziness.  Her lowest blood 

pressure was 90 mmHg systolic when she was tilted 70 after nitroglycerin.  

Resolved completely while she was lying supine.  This suggest orthostatic 

hypotension without syncope.  Patient will be advised to increase fluid status, 

increase salt intake, behavioral modification and start Florinef 0.1 mg daily.











SHERINE PIERRE MD              May 7, 2020 11:29

## 2021-01-20 ENCOUNTER — HOSPITAL ENCOUNTER (OUTPATIENT)
Dept: HOSPITAL 75 - RAD | Age: 31
End: 2021-01-20
Attending: EMERGENCY MEDICINE
Payer: COMMERCIAL

## 2021-01-20 ENCOUNTER — HOSPITAL ENCOUNTER (EMERGENCY)
Dept: HOSPITAL 75 - ER | Age: 31
Discharge: HOME | End: 2021-01-20
Payer: COMMERCIAL

## 2021-01-20 VITALS — WEIGHT: 191.8 LBS | HEIGHT: 66.02 IN | BODY MASS INDEX: 30.82 KG/M2

## 2021-01-20 VITALS — DIASTOLIC BLOOD PRESSURE: 76 MMHG | SYSTOLIC BLOOD PRESSURE: 111 MMHG

## 2021-01-20 DIAGNOSIS — Z80.3: ICD-10-CM

## 2021-01-20 DIAGNOSIS — M54.41: ICD-10-CM

## 2021-01-20 DIAGNOSIS — Z83.3: ICD-10-CM

## 2021-01-20 DIAGNOSIS — R10.31: Primary | ICD-10-CM

## 2021-01-20 DIAGNOSIS — Z80.1: ICD-10-CM

## 2021-01-20 DIAGNOSIS — N83.201: Primary | ICD-10-CM

## 2021-01-20 DIAGNOSIS — Z87.891: ICD-10-CM

## 2021-01-20 DIAGNOSIS — Z80.0: ICD-10-CM

## 2021-01-20 DIAGNOSIS — Z82.49: ICD-10-CM

## 2021-01-20 LAB
ALBUMIN SERPL-MCNC: 4.3 GM/DL (ref 3.2–4.5)
ALP SERPL-CCNC: 69 U/L (ref 40–136)
ALT SERPL-CCNC: 15 U/L (ref 0–55)
APTT PPP: YELLOW S
BACTERIA #/AREA URNS HPF: NEGATIVE /HPF
BASOPHILS # BLD AUTO: 0 10^3/UL (ref 0–0.1)
BASOPHILS NFR BLD AUTO: 0 % (ref 0–10)
BILIRUB SERPL-MCNC: 0.4 MG/DL (ref 0.1–1)
BILIRUB UR QL STRIP: NEGATIVE
BUN/CREAT SERPL: 10
CALCIUM SERPL-MCNC: 9.1 MG/DL (ref 8.5–10.1)
CHLORIDE SERPL-SCNC: 105 MMOL/L (ref 98–107)
CO2 SERPL-SCNC: 22 MMOL/L (ref 21–32)
CREAT SERPL-MCNC: 0.87 MG/DL (ref 0.6–1.3)
EOSINOPHIL # BLD AUTO: 0.2 10^3/UL (ref 0–0.3)
EOSINOPHIL NFR BLD AUTO: 2 % (ref 0–10)
FIBRINOGEN PPP-MCNC: CLEAR MG/DL
GFR SERPLBLD BASED ON 1.73 SQ M-ARVRAT: > 60 ML/MIN
GLUCOSE SERPL-MCNC: 96 MG/DL (ref 70–105)
GLUCOSE UR STRIP-MCNC: NEGATIVE MG/DL
HCT VFR BLD CALC: 42 % (ref 35–52)
HGB BLD-MCNC: 13.3 G/DL (ref 11.5–16)
KETONES UR QL STRIP: NEGATIVE
LEUKOCYTE ESTERASE UR QL STRIP: NEGATIVE
LYMPHOCYTES # BLD AUTO: 2.4 10^3/UL (ref 1–4)
LYMPHOCYTES NFR BLD AUTO: 27 % (ref 12–44)
MANUAL DIFFERENTIAL PERFORMED BLD QL: NO
MCH RBC QN AUTO: 28 PG (ref 25–34)
MCHC RBC AUTO-ENTMCNC: 32 G/DL (ref 32–36)
MCV RBC AUTO: 88 FL (ref 80–99)
MONOCYTES # BLD AUTO: 0.5 10^3/UL (ref 0–1)
MONOCYTES NFR BLD AUTO: 6 % (ref 0–12)
NEUTROPHILS # BLD AUTO: 5.9 10^3/UL (ref 1.8–7.8)
NEUTROPHILS NFR BLD AUTO: 65 % (ref 42–75)
NITRITE UR QL STRIP: NEGATIVE
PH UR STRIP: 6 [PH] (ref 5–9)
PLATELET # BLD: 254 10^3/UL (ref 130–400)
PMV BLD AUTO: 11.9 FL (ref 9–12.2)
POTASSIUM SERPL-SCNC: 4.2 MMOL/L (ref 3.6–5)
PROT SERPL-MCNC: 7.6 GM/DL (ref 6.4–8.2)
PROT UR QL STRIP: NEGATIVE
RBC #/AREA URNS HPF: (no result) /HPF
SODIUM SERPL-SCNC: 140 MMOL/L (ref 135–145)
SP GR UR STRIP: <=1.005 (ref 1.02–1.02)
SQUAMOUS #/AREA URNS HPF: (no result) /HPF
WBC # BLD AUTO: 9 10^3/UL (ref 4.3–11)
WBC #/AREA URNS HPF: (no result) /HPF

## 2021-01-20 PROCEDURE — 86141 C-REACTIVE PROTEIN HS: CPT

## 2021-01-20 PROCEDURE — 85025 COMPLETE CBC W/AUTO DIFF WBC: CPT

## 2021-01-20 PROCEDURE — 76856 US EXAM PELVIC COMPLETE: CPT

## 2021-01-20 PROCEDURE — 76830 TRANSVAGINAL US NON-OB: CPT

## 2021-01-20 PROCEDURE — 80053 COMPREHEN METABOLIC PANEL: CPT

## 2021-01-20 PROCEDURE — 36415 COLL VENOUS BLD VENIPUNCTURE: CPT

## 2021-01-20 PROCEDURE — 84703 CHORIONIC GONADOTROPIN ASSAY: CPT

## 2021-01-20 PROCEDURE — 81000 URINALYSIS NONAUTO W/SCOPE: CPT

## 2021-01-20 NOTE — DIAGNOSTIC IMAGING REPORT
PROCEDURE: 

Pelvic comp/transvaginal sonogram.



TECHNIQUE: 

Complete transabdominal and transvaginal pelvic ultrasound was

performed. In addition, limited pelvic Doppler was performed.



INDICATION:  Right pelvic pain.



Uterus is anteverted measuring 10.8 x 4.8 x 5.4 cm. Endometrium

is 10 mm in thickness. No myometrial mass is detected. Right

ovary measures 4.4 x 2.3 x 2.2 cm and left ovary measures 2.7 x

1.8 x 2.4 cm. The right ovary does contain a cyst measuring

approximately 15 mm x 19 mm. There is blood flow to both ovaries.

No free fluid is detected.



IMPRESSION: Small right ovarian cyst. The study is otherwise

unremarkable.







Dictated by: 



  Dictated on workstation # [id number]

## 2021-01-20 NOTE — ED ABDOMINAL PAIN
General


Chief Complaint:  Abdominal/GI Problems


Stated Complaint:  ABD PAIN


Source of Information:  Patient


Exam Limitations:  No Limitations





History of Present Illness


Date Seen by Provider:  Jan 20, 2021


Time Seen by Provider:  00:15


Initial Comments


Patient presents ER by private conveyance with chief complaint of low pelvic 

pain on the right side.  Pain in her lower back radiating down her buttock and 

to the upper part of her thigh.  She says she gets this pain from time to time 

about once a month.  Her last menstrual period was about 7 to 10 days ago and 

lasted 4 days.  She had ovarian cysts before that she had have surgery for in 

2008.  She has had her appendix and gallbladder out tubes are tied.  She missed 

her period last month.  She is not usually irregular.  She did not check a 

pregnancy test.  She is having some mild nausea but no vomiting.  Had a bowel 

movement around 9:00.  Her pain started around 830.  Bowel movement made no 

difference.  Took some Tylenol After 9.  She takes medications for psychiatric 

reasons but no significant medical history otherwise.  No history of endometri

osis.





Allergies and Home Medications


Allergies


Coded Allergies:  


     No Known Drug Allergies (Verified , 4/15/20)





Home Medications


Acetaminophen with Codeine 1 Each Tablet, 1-2 TAB PO Q6H PRN for Pain


   Prescribed by: MARCELO OLIVEIRA on 4/11/17 0827


Cephalexin 500 Mg Tablet, 500 MG PO BID


   Prescribed by: JEREMI MARTIN on 4/5/20 0101


Cyclobenzaprine HCl 10 Mg Tablet, 10 MG PO Q8H PRN for SPASMS


   Prescribed by: ANGEL GARCIA on 5/2/20 0222


Docusate Sodium 100 Mg Capsule, 100 MG PO BID PRN for CONSTIPATION-1ST LINE


   Prescribed by: MARCELO OLIVEIRA on 4/10/17 0944


Ferrous Sulfate 325 Mg Tablet, 325 MG PO DAILY


   Prescribed by: MARCELO OLIVEIRA on 4/10/17 0945


Ibuprofen 600 Mg Tablet, 600 MG PO Q6H


   Prescribed by: MARCELO OLIVEIRA on 4/10/17 0944


Omeprazole 20 Mg Tablet.dr, 20 MG PO DAILY, (Reported)


Prenatal Vit/Iron Fumarate/FA 1 Each Tablet, 1 EACH PO DAILY, (Reported)





Patient Home Medication List


Home Medication List Reviewed:  Yes





Review of Systems


Review of Systems


Constitutional:  No chills, No fever


EENTM:  No Blurred Vision, No Double Vision


Respiratory:  Denies Cough, Denies Shortness of Air


Cardiovascular:  Denies Chest Pain, Denies Lightheadedness


Gastrointestinal:  See HPI, Abdominal Pain; Denies Constipated, Denies Diarrhea;

Nausea; Denies Vomiting


Genitourinary:  Denies Burning, Denies Discharge, Denies Drainage


Musculoskeletal:  see HPI, back pain; No joint pain


Skin:  No pruritus, No rash





All Other Systems Reviewed


Negative Unless Noted:  Yes





Past Medical-Social-Family Hx


Patient Social History


Type Used:  Cigarettes


Former Smoker, Quit:  Aug 1, 2016


2nd Hand Smoke Exposure:  No


Recent Hopitalizations:  No





Immunizations Up To Date


Tetanus Booster (TDap):  Less than 5yrs


PED Vaccines UTD:  No


Date of Influenza Vaccine:  Oct 15, 2019





Seasonal Allergies


Seasonal Allergies:  No





Past Medical History


Surgeries:  Yes


Appendectomy, Cystectomy, Gallbladder, Tubal Ligation


Respiratory:  No


Currently Using CPAP:  No


Currently Using BIPAP:  No


Cardiac:  No


Neurological:  No


Reproductive Disorders:  No


Female Reproductive Disorders:  Ovarian Cyst


GYN History:  Tubal Ligation


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


HIV/AIDS:  No


Genitourinary:  No


Kidney Infection


Gastrointestinal:  No


Gall Bladder Disease


Musculoskeletal:  No


Endocrine:  No


HEENT:  No


Loss of Vision:  Denies


Hearing Impairment:  Denies


Cancer:  No


Psychosocial:  Yes


Anxiety, Depression


Integumentary:  No


Blood Disorders:  No


Adverse Reaction/Blood Tranf:  No





Family Medical History





Colon cancer


  GRANDFATHER


Diabetes mellitus


  19 MOTHER


  GRANDFATHER


  GRANDMOTHER


FH: breast cancer


  GRANDMOTHER


FH: depression


  19 FATHER


  19 MOTHER


FH: emphysema


  GRANDFATHER


FH: lung cancer


  GRANDMOTHER


FH: sleep apnea


  19 MOTHER


FH: stroke


  GRANDFATHER


Hepatitis C


  19 FATHER


Hypercholesterolemia


  19 MOTHER


  GRANDFATHER


  GRANDMOTHER


Hypertension


  19 FATHER


  19 MOTHER


  GRANDMOTHER


Myocardial infarction


  GRANDFATHER


Thyroid disease (HYPOTHYROIDISM)


  19 MOTHER


Heart Disease, Cancer, Diabetes, Hypertension





Physical Exam


Vital Signs





Vital Signs - First Documented








 1/20/21





 00:13


 


Temp 36.4


 


Pulse 76


 


Resp 16


 


B/P (MAP) 137/97 (110)


 


O2 Delivery Room Air





Capillary Refill :


Height/Weight/BMI


Height: 5'6.50"


Weight: 225lbs. 0.0oz. 102.824979np; 30.00 BMI


Method:Stated


General Appearance:  WD/WN, mild distress


HEENT:  PERRL/EOMI, pharynx normal


Respiratory:  lungs clear, normal breath sounds, no respiratory distress, no 

accessory muscle use


Cardiovascular:  normal peripheral pulses, regular rate, rhythm


Peripheral Pulses:  2+ Dorsalis Pedis (R), 2+ Left Dors-Pedis (L)


Gastrointestinal:  normal bowel sounds, non tender, soft; No guarding, No rebo

und; other (Pressure over her right lower quadrant made her pain feel better)


Extremities:  normal range of motion, non-tender, normal capillary refill


Neurologic/Psychiatric:  alert, oriented x 3


Skin:  normal color, warm/dry





Procedures/Interventions





   Suture Size:  4-0





Progress/Results/Core Measures


Results/Orders


Lab Results





Laboratory Tests








Test


 1/20/21


00:15 1/20/21


00:45 Range/Units


 


 


Urine Color YELLOW    


 


Urine Clarity CLEAR    


 


Urine pH 6.0   5-9  


 


Urine Specific Gravity <=1.005   1.016-1.022  


 


Urine Protein NEGATIVE   NEGATIVE  


 


Urine Glucose (UA) NEGATIVE   NEGATIVE  


 


Urine Ketones NEGATIVE   NEGATIVE  


 


Urine Nitrite NEGATIVE   NEGATIVE  


 


Urine Bilirubin NEGATIVE   NEGATIVE  


 


Urine Urobilinogen 0.2   < = 1.0  MG/DL


 


Urine Leukocyte Esterase NEGATIVE   NEGATIVE  


 


Urine RBC (Auto) NEGATIVE   NEGATIVE  


 


Urine RBC NONE    /HPF


 


Urine WBC RARE    /HPF


 


Urine Squamous Epithelial


Cells 0-2 


 


  /HPF





 


Urine Crystals NONE    /LPF


 


Urine Bacteria NEGATIVE    /HPF


 


Urine Casts NONE    /LPF


 


Urine Mucus NEGATIVE    /LPF


 


Urine Culture Indicated NO    


 


White Blood Count


 


 9.0 


 4.3-11.0


10^3/uL


 


Red Blood Count


 


 4.71 


 3.80-5.11


10^6/uL


 


Hemoglobin  13.3  11.5-16.0  g/dL


 


Hematocrit  42  35-52  %


 


Mean Corpuscular Volume  88  80-99  fL


 


Mean Corpuscular Hemoglobin  28  25-34  pg


 


Mean Corpuscular Hemoglobin


Concent 


 32 


 32-36  g/dL





 


Red Cell Distribution Width  13.2  10.0-14.5  %


 


Platelet Count


 


 254 


 130-400


10^3/uL


 


Mean Platelet Volume  11.9  9.0-12.2  fL


 


Immature Granulocyte % (Auto)  0   %


 


Neutrophils (%) (Auto)  65  42-75  %


 


Lymphocytes (%) (Auto)  27  12-44  %


 


Monocytes (%) (Auto)  6  0-12  %


 


Eosinophils (%) (Auto)  2  0-10  %


 


Basophils (%) (Auto)  0  0-10  %


 


Neutrophils # (Auto)


 


 5.9 


 1.8-7.8


10^3/uL


 


Lymphocytes # (Auto)


 


 2.4 


 1.0-4.0


10^3/uL


 


Monocytes # (Auto)


 


 0.5 


 0.0-1.0


10^3/uL


 


Eosinophils # (Auto)


 


 0.2 


 0.0-0.3


10^3/uL


 


Basophils # (Auto)


 


 0.0 


 0.0-0.1


10^3/uL


 


Immature Granulocyte # (Auto)


 


 0.0 


 0.0-0.1


10^3/uL


 


Sodium Level  140  135-145  MMOL/L


 


Potassium Level  4.2  3.6-5.0  MMOL/L


 


Chloride Level  105    MMOL/L


 


Carbon Dioxide Level  22  21-32  MMOL/L


 


Anion Gap  13  5-14  MMOL/L


 


Blood Urea Nitrogen  9  7-18  MG/DL


 


Creatinine


 


 0.87 


 0.60-1.30


MG/DL


 


Estimat Glomerular Filtration


Rate 


 > 60 


  





 


BUN/Creatinine Ratio  10   


 


Glucose Level  96    MG/DL


 


Calcium Level  9.1  8.5-10.1  MG/DL


 


Corrected Calcium  8.9  8.5-10.1  MG/DL


 


Total Bilirubin  0.4  0.1-1.0  MG/DL


 


Aspartate Amino Transf


(AST/SGOT) 


 18 


 5-34  U/L





 


Alanine Aminotransferase


(ALT/SGPT) 


 15 


 0-55  U/L





 


Alkaline Phosphatase  69    U/L


 


C-Reactive Protein High


Sensitivity 


 1.00 H


 0.00-0.50


MG/DL


 


Total Protein  7.6  6.4-8.2  GM/DL


 


Albumin  4.3  3.2-4.5  GM/DL








My Orders





Orders - VENU KWOK


Ua Culture If Indicated (1/20/21 00:13)


Urine Pregnancy Bedside (1/20/21 00:13)


Ketorolac Injection (Toradol Injection) (1/20/21 00:30)


Ondansetron Injection (Zofran Injectio (1/20/21 00:30)


Lactated Ringers (Lr 1000 Ml Iv Solution (1/20/21 00:30)


Cbc With Automated Diff (1/20/21 00:26)


Comprehensive Metabolic Panel (1/20/21 00:26)


Hs C Reactive Protein (1/20/21 00:26)


Rx-Tramadol Hcl (Rx-Ultram) (1/20/21 01:30)





Medications Given in ED





Current Medications








 Medications  Dose


 Ordered  Sig/Kristin


 Route  Start Time


 Stop Time Status Last Admin


Dose Admin


 


 Ketorolac


 Tromethamine  30 mg  ONCE  ONCE


 IVP  1/20/21 00:30


 1/20/21 00:31 DC 1/20/21 00:45


30 MG


 


 Lactated Ringer's  1,000 ml @ 


 0 mls/hr  Q0M ONCE


 IV  1/20/21 00:30


 1/20/21 00:31 DC 1/20/21 00:45


999 MLS/HR


 


 Ondansetron HCl  4 mg  ONCE  ONCE


 IVP  1/20/21 00:30


 1/20/21 00:31 DC 1/20/21 00:45


4 MG








Vital Signs/I&O











 1/20/21





 00:13


 


Temp 36.4


 


Pulse 76


 


Resp 16


 


B/P (MAP) 137/97 (110)


 


O2 Delivery Room Air











Progress


Progress Note #1:  


   Time:  00:33


Progress Note


Mittelschmerz versus ovarian cysts versus musculoskeletal back pain with 

sciatica.  She has had a bowel movement so bowel obstructions much less likely. 

Her clinical exam is not very concerning and she does not have a surgical 

abdomen.  Vital signs are normal.  If her labs are okay we will set her up for a

ultrasound of the pelvis in the morning.  She has been having pain since 

yesterday and again tonight so tubal torsion seems less likely.  Toradol for her

discomfort, ondansetron for nausea.  LR in case we do a CT with contrast.


Progress Note #2:  


   Time:  01:32


Progress Note


The patient did not receive much pain relief with the Toradol.  [place] over 

the take-home pack of tramadol.  Her physical exam, vital signs and laboratory 

examination are unremarkable.  I suspect the answer will be found with an 

ultrasound of her pelvis.  We will set her up for 1 in a few hours outpatient.  

The patient states she is known to Dr. Barajas for OB so we will have her follow-

up with him outpatient.  If there is an abnormal ultrasound report then they can

call the on-call ER doctor.





Departure


Impression





   Primary Impression:  


   Right lower quadrant abdominal pain


   Additional Impression:  


   Lumbago with sciatica, right side


   Qualified Codes:  M54.41 - Lumbago with sciatica, right side


Disposition:  01 HOME, SELF-CARE


Condition:  Stable





Departure-Patient Inst.


Decision time for Depature:  01:35


Referrals:  


[facility] (PCP/Family)


Primary Care Physician








KAY BARAJAS DO


Patient Instructions:  Pelvic Pain (DC), Sciatica Exercises, Low Back Pain ED





Add. Discharge Instructions:  


Drink plenty of fluids.


Tylenol 1000 mg every 8 hours as necessary for pain.


Ibuprofen 800 mg every 8 hours as necessary for pain.


Tramadol 1 tablet every 6 hours as necessary for breakthrough pain.


This morning call and set up a ultrasound in the next 1 to 2 days outpatient of 

your pelvis.


You can follow-up with Dr. aBrajas in the clinic for results by calling his 

office.


Return to the ER for intractable pain, nausea, fevers or other worrisome 

symptoms.


Flexeril/cyclobenzaprine 1 tablet every 8 hours as necessary for spasms in the 

back.


All discharge instructions reviewed with patient and/or family. Voiced 

understanding.


Scripts


Cyclobenzaprine HCl (Cyclobenzaprine HCl) 10 Mg Tablet


10 MG PO Q8H PRN for SPASMS, #15 TAB 0 Refills


   Prov: VENU KWOK         1/20/21


Work/School Note:  Work Release Form   Date Seen in the Emergency Department:  

Jan 20, 2021


   Return to Work:  Jan 21, 2021


   Restrictions:  No Restrictions





Copy


Copies To 1:   KAY BARAJAS TITUS J                 Jan 20, 2021 00:35

## 2022-01-07 ENCOUNTER — HOSPITAL ENCOUNTER (OUTPATIENT)
Dept: HOSPITAL 75 - CARD | Age: 32
End: 2022-01-07
Attending: NURSE PRACTITIONER
Payer: COMMERCIAL

## 2022-01-07 ENCOUNTER — HOSPITAL ENCOUNTER (OUTPATIENT)
Dept: HOSPITAL 75 - RAD | Age: 32
End: 2022-01-07
Attending: OBSTETRICS & GYNECOLOGY
Payer: COMMERCIAL

## 2022-01-07 DIAGNOSIS — R10.2: Primary | ICD-10-CM

## 2022-01-07 DIAGNOSIS — R10.9: ICD-10-CM

## 2022-01-07 DIAGNOSIS — R06.00: ICD-10-CM

## 2022-01-07 DIAGNOSIS — R07.9: Primary | ICD-10-CM

## 2022-01-07 LAB
ALBUMIN SERPL-MCNC: 4.1 GM/DL (ref 3.2–4.5)
ALP SERPL-CCNC: 70 U/L (ref 40–136)
ALT SERPL-CCNC: 22 U/L (ref 0–55)
AMYLASE SERPL-CCNC: 34 U/L (ref 25–125)
BASOPHILS # BLD AUTO: 0 10^3/UL (ref 0–0.1)
BASOPHILS NFR BLD AUTO: 1 % (ref 0–10)
BILIRUB SERPL-MCNC: 0.7 MG/DL (ref 0.1–1)
BUN/CREAT SERPL: 13
CALCIUM SERPL-MCNC: 9.8 MG/DL (ref 8.5–10.1)
CHLORIDE SERPL-SCNC: 105 MMOL/L (ref 98–107)
CO2 SERPL-SCNC: 24 MMOL/L (ref 21–32)
CREAT SERPL-MCNC: 0.79 MG/DL (ref 0.6–1.3)
EOSINOPHIL # BLD AUTO: 0.2 10^3/UL (ref 0–0.3)
EOSINOPHIL NFR BLD AUTO: 3 % (ref 0–10)
GFR SERPLBLD BASED ON 1.73 SQ M-ARVRAT: 84 ML/MIN
GLUCOSE SERPL-MCNC: 91 MG/DL (ref 70–105)
HCT VFR BLD CALC: 42 % (ref 35–52)
HGB BLD-MCNC: 13.4 G/DL (ref 11.5–16)
LIPASE SERPL-CCNC: 19 U/L (ref 8–78)
LYMPHOCYTES # BLD AUTO: 2.4 10^3/UL (ref 1–4)
LYMPHOCYTES NFR BLD AUTO: 44 % (ref 12–44)
MANUAL DIFFERENTIAL PERFORMED BLD QL: NO
MCH RBC QN AUTO: 28 PG (ref 25–34)
MCHC RBC AUTO-ENTMCNC: 32 G/DL (ref 32–36)
MCV RBC AUTO: 87 FL (ref 80–99)
MONOCYTES # BLD AUTO: 0.4 10^3/UL (ref 0–1)
MONOCYTES NFR BLD AUTO: 8 % (ref 0–12)
NEUTROPHILS # BLD AUTO: 2.5 10^3/UL (ref 1.8–7.8)
NEUTROPHILS NFR BLD AUTO: 45 % (ref 42–75)
PLATELET # BLD: 202 10^3/UL (ref 130–400)
PMV BLD AUTO: 10.5 FL (ref 9–12.2)
POTASSIUM SERPL-SCNC: 3.8 MMOL/L (ref 3.6–5)
PROT SERPL-MCNC: 7.5 GM/DL (ref 6.4–8.2)
SODIUM SERPL-SCNC: 138 MMOL/L (ref 135–145)
WBC # BLD AUTO: 5.6 10^3/UL (ref 4.3–11)

## 2022-01-07 PROCEDURE — 85025 COMPLETE CBC W/AUTO DIFF WBC: CPT

## 2022-01-07 PROCEDURE — 93005 ELECTROCARDIOGRAM TRACING: CPT

## 2022-01-07 PROCEDURE — 84443 ASSAY THYROID STIM HORMONE: CPT

## 2022-01-07 PROCEDURE — 76830 TRANSVAGINAL US NON-OB: CPT

## 2022-01-07 PROCEDURE — 82150 ASSAY OF AMYLASE: CPT

## 2022-01-07 PROCEDURE — 84484 ASSAY OF TROPONIN QUANT: CPT

## 2022-01-07 PROCEDURE — 36415 COLL VENOUS BLD VENIPUNCTURE: CPT

## 2022-01-07 PROCEDURE — 76856 US EXAM PELVIC COMPLETE: CPT

## 2022-01-07 PROCEDURE — 80053 COMPREHEN METABOLIC PANEL: CPT

## 2022-01-07 PROCEDURE — 83690 ASSAY OF LIPASE: CPT

## 2022-01-07 PROCEDURE — 85379 FIBRIN DEGRADATION QUANT: CPT

## 2022-01-07 NOTE — DIAGNOSTIC IMAGING REPORT
PROCEDURE: US Non-ob pelvis comp/trans.



TECHNIQUE:  Multiple realtime grayscale images were obtained of

the pelvis in various projections endovaginally.



Transabdominal imaging was also performed.



INDICATION:  Pelvic and perineal pain.



COMPARISON: 01/20/2021



FINDINGS:

The uterus measures 10.2 x 5.8 x 7.2 cm. The myometrium is normal

in echogenicity without discrete mass. The endometrium measures

up to 1.7 cm where visualized, and is normal in echogenicity.



The right ovary measures 5.1 x 2.9 x 2.6 cm. The left ovary

measures 3.4 x 1.9 x 1.9 cm. Both ovaries are physiologic in

appearance. No ovarian mass. Blood flow is seen in both ovaries

on color doppler imaging. Cystic tubular anechoic structure in

the left adnexa which may represent hydrosalpinx. No right-sided

hydrosalpinx.



IMPRESSION: 

1. Dilated tubular structure in the left adnexa may represent

hydrosalpinx.

2. Ovaries are normal in appearance.



Dictated by: 



  Dictated on workstation # [id number]

## 2022-01-13 ENCOUNTER — HOSPITAL ENCOUNTER (OUTPATIENT)
Dept: HOSPITAL 75 - RAD | Age: 32
End: 2022-01-13
Attending: NURSE PRACTITIONER
Payer: COMMERCIAL

## 2022-01-13 DIAGNOSIS — R10.2: ICD-10-CM

## 2022-01-13 DIAGNOSIS — R10.13: Primary | ICD-10-CM

## 2022-01-13 DIAGNOSIS — R07.9: ICD-10-CM

## 2022-01-13 PROCEDURE — 74177 CT ABD & PELVIS W/CONTRAST: CPT

## 2022-01-13 PROCEDURE — 71260 CT THORAX DX C+: CPT

## 2022-01-13 NOTE — DIAGNOSTIC IMAGING REPORT
PROCEDURE: CT chest, abdomen, and pelvis with contrast.



TECHNIQUE: Multiple contiguous axial images were obtained through

the chest, abdomen, and pelvis after the administration of

intravenous contrast. Auto Exposure Controls were utilized during

the CT exam to meet ALARA standards for radiation dose reduction.





INDICATION: Pressure in the sternal region as well as epigastric

pain. Patient does have a history of pancreatitis and weight

loss.



COMPARISON: No prior studies are available for comparison.



CT CHEST: No axillary, hilar or mediastinal lymphadenopathy is

detected. No pericardial or pleural fluid is identified. No

pulmonary infiltrates, nodules or masses are seen. The bony

structures appear unremarkable. 



CT ABDOMEN AND PELVIS: No focal liver mass is identified. The

gallbladder is surgically absent. No biliary ductal dilatation is

seen. The pancreas demonstrates homogeneous enhancement. No mass

is identified. No peripancreatic inflammation or fluid is

identified. The spleen is unremarkable. No adrenal mass is

detected. Kidneys are unremarkable. Aorta is nonaneurysmal. No

abdominal lymphadenopathy is seen. Bowel loops appear to be

normal caliber. There is no obstruction. No free fluid or fluid

collection is seen. The uterus and bladder are unremarkable. Bony

structures are unremarkable.



IMPRESSION: Essentially unremarkable CT of the chest, abdomen and

pelvis. No acute feature is detected.



Dictated by: 



  Dictated on workstation # [id number]

## 2022-02-14 NOTE — DIAGNOSTIC IMAGING REPORT
HISTORY: Bilateral hand pain



TECHNIQUE: 3 views of the bilateral hands.



COMPARISON: None



FINDINGS:



No acute fracture or dislocation is seen in the bilateral hands.

Alignment appears normal. Joint spaces are preserved. No cortical

erosions are seen.



IMPRESSION:

1. No acute osseous abnormality is seen in the bilateral hands.



Dictated by: 



  Dictated on workstation # [id number]

## 2022-03-23 NOTE — DIAGNOSTIC IMAGING REPORT
CLINICAL INDICATION: Patient with migraines.



EXAM: MRI of the brain performed without and with 14 cc of

Clariscan IV contrast. Sequences include axial DWI, ADC map,

axial gradient echo, axial FLAIR, axial T1, axial T2, axial T1

post IV contrast whole brain, coronal T1 fat-sat post IV contrast

whole brain, and sagittal T1 fat-sat post IV contrast whole

brain.



Comparison: None.



Findings:

There is no evidence of acute cerebral infarct, intracranial

hemorrhage, or gross mass effect. There is no abnormal IV

contrast enhancement.



The brain parenchymal volume appears appropriate for patient's

age. There is normal gray-white matter distinction.  There is no

significant midline shift or herniation. 



The [place] vascular structures show no gross

abnormality as visualized. The pituitary gland, sella, and

suprasellar regions are unremarkable as visualized. There is no

evidence of hydrocephalus. The basal cisterns are unremarkable.

The skull, extracranial soft tissue, and orbits are unremarkable.

There is very minimal mucosal thickening involving right

maxillary sinus. Temporal bones show no significant abnormality.



IMPRESSION:

There is very minimal right maxillary sinus mucosal thickening.

Otherwise, unremarkable MRI of the brain.



Dictated by: 



  Dictated on workstation # [id number]

## 2022-04-01 NOTE — PROGRESS NOTE-POST OPERATIVE
Post-Operative Progess Note


Surgeon (s)/Assistant (s)


Surgeon


JESSE SHORT MD


Assistant:  none





Pre-Operative Diagnosis


GERD,PUD





Post-Operative Diagnosis





reflux esophagitis(grade B), no HH, moderate-severe diffuse gastritis.





Procedure & Operative Findings


Date of Procedure


4/1/22


Procedure Performed/Findings


EGD with bx.


Anesthesia Type


mac





Estimated Blood Loss


Estimated blood loss (mL):  minimal





Specimens/Packing


Specimens Removed


ge jxn, antrum











JESSE SHORT MD                 Apr 1, 2022 13:42

## 2022-04-01 NOTE — ANESTHESIA-GENERAL POST-OP
MAC


Patient Condition


Mental Status/LOC:  Same as Preop


Cardiovascular:  Satisfactory


Nausea/Vomiting:  Absent


Respiratory:  Satisfactory


Pain:  Controlled


Complications:  Absent





Post Op Complications


Complications


None





Follow Up Care/Instructions


Patient Instructions


None needed.





Anesthesiology Discharge Order


Discharge Order


Patient is doing well, no complaints, stable vital signs, no apparent adverse 

anesthesia problems.   


No complications reported per nursing.











DAIANA LOPEZ CRNA            Apr 1, 2022 13:53

## 2022-04-01 NOTE — OPERATIVE REPORT
DATE OF SERVICE:  04/01/2022



ATTENDING PRIMARY CARE PHYSICIAN:

Dr. Jose Bosch.



PREOPERATIVE DIAGNOSES:

Gastroesophageal reflux disease and peptic ulcer disease.



POSTOPERATIVE DIAGNOSES:

Reflux esophagitis [place] grade B, no hiatal hernia, moderate to severe

gastritis.  No formal ulcerations.



PROCEDURE:

EGD with biopsy.



SURGEON:

Jesse Short MD



ANESTHESIA:

Monitored anesthesia care.



ESTIMATED BLOOD LOSS:

Minimal.



FINDINGS:

Reflux esophagitis [place] grade B, no hiatal hernia, moderate to severe

gastritis.  No formal ulcerations.



DISPOSITION:

The patient tolerated the procedure well.



INDICATIONS:

The patient is a 31-year-old female referred over to us for an EGD.  She reports

epigastric crampy pain, intermittent nausea and this has been going on an

intermittent basis for the past two years.  She does not report any lower

gastrointestinal symptoms of any constipation or diarrhea as well as no red

blood per rectum nor any dark tarry stools.  She has had a CT scan done as well

as labs drawn recently, which have been normal.  She states that she is also

status post laparoscopic cholecystectomy.  She does take nonsteroidal

anti-inflammatories with the Naprosyn and does vape as well as taking

caffeinated beverages.



DESCRIPTION OF PROCEDURE:

The patient was brought to the endoscopy suite, laid in the left lateral

decubitus position.  After adequate IV pain and sedative medications and

monitored anesthesia care, the mouthpiece was applied.



The endoscope was placed in the mouth, visualizing the pharynx and

hypopharyngeal region.  Vocal cords, epiglottis and vallecula identified and

appeared to be normal.  The endoscope was then gently intubated at the

esophageal opening and esophagus insufflated.  The endoscope was then advanced

through the first, second and third portion of esophagus at the level of the GE

junction, a reflux esophagitis [place] grade B identified.  No ulcers or

strictures identified in the region.  A biopsy was taken with forceps with

visualization of good hemostasis.  The endoscope was then advanced into the

stomach and endoscope retroflexed visualizing no hiatal hernia.  There was a

moderate severity gastritis, which was more diffuse, more likely related to

NSAID use.  No formal ulcerations, polyps, or any neoplasms.  A biopsy was taken

of the antrum to rule out H. pylori with visualization of good hemostasis.  The

endoscope was then advanced to the pylorus and the first and second portion of

the duodenum, which appeared normal with no ulcerations or any distal

obstructions.  The endoscope was then slowly withdrawn while taking a second

look and suctioning of residual air with no additional findings.



The patient tolerated the procedure well.  We will recommend the necessary

lifestyle and dietary accommodation including avoidance of nonsteroidal

anti-inflammatories as much as possible as well as the cessation of smoking,

alcohol as well as caffeinated beverages.  We will also recommend small and more

frequent meals, avoiding to eating at night as well as head elevation while

lying supine.  We will also start her on Protonix 40 mg daily.





Job ID: [id number]

DocumentID: [id number]

Dictated Date:  04/01/2022 13:39:08

Transcription Date: 04/01/2022 21:51:22

Dictated By: JESSE SHORT MD

## 2022-04-01 NOTE — PROGRESS NOTE-PRE OPERATIVE
Pre-Operative Progress Note


H&P Reviewed


The H&P was reviewed, patient examined and no changes noted.


Date Seen by Provider:  Apr 1, 2022


Time Seen by Provider:  11:00


Date H&P Reviewed:  Apr 1, 2022


Time H&P Reviewed:  11:00


Pre-Operative Diagnosis:  GERD,PUD











JESSE SHORT MD                 Apr 1, 2022 11:23

## 2022-04-01 NOTE — DISCHARGE INST-SURGICAL
D/C Lap Instructions-KIDO


New, Converted, or Re-Newed RX:  RX on Chart


Follow Up Appt 





Activity as tolerated








High Fiber Diet 25g or more per day





Avoid Alcohol, Caffeine, Spicy Greasy and Acid foods.





Drink 64 fluid oz or more of fluids per day.





Symptoms to Report: Fever over 101 degree F, Nausea/Vomiting 


If any problems/questions: Contact your physician or go to Emergency Room











JESSE SHORT MD                 Apr 1, 2022 11:26

## 2022-06-24 NOTE — ED UPPER EXTREMITY
General


Chief Complaint:  Upper Extremity


Stated Complaint:  R HAND MIDDLE FINGER PAIN


Nursing Triage Note:  


PT AMB TO FT 1 WITH DAUGHTER WITH C/O R MIDDLE FINGER PAIN FOR A FEW MONTHS BUT 


INCREASED PAIN TODAY. PT SAID SHE HAS BEEN TESTED FOR RA AND CARPEL TUNNEL IN 


THE PAST


Source:  patient


Exam Limitations:  no limitations





History of Present Illness


Date Seen by Provider:  Jun 24, 2022


Time Seen by Provider:  16:00


Initial Comments


This is a healthy 31-year-old female that presents to the emergency room for 

evaluation of right third finger pain.  She states that she has had pain for 

approximately 2 months and has been tested for carpal tunnel and rheumatoid 

arthritis.  She states that the pain has seemingly worsened recently and that 

over-the-counter medications have not helped.  She denies fever, trauma, chills


Pain/Injury Location:  right 3rd finger


Method of Injury:  unknown





Allergies and Home Medications


Allergies


Coded Allergies:  


     No Known Drug Allergies (Verified , 4/15/20)





Patient Home Medication List


Home Medication List Reviewed:  Yes


Acetaminophen (Tylenol Extra Strength) 500 Mg Tablet, 1,000 MG PO DAILY, 

(Reported)


   Entered as Reported by: JULIE A IBEH on 3/28/22 1251


Fexofenadine HCl (Allegra Allergy) 180 Mg Tablet, 180 MG PO DAILY, (Reported)


   Entered as Reported by: JULIE A IBEH on 3/28/22 1251


Naproxen (Naprosyn) 500 Mg Tablet, 1,000 MG PO DAILY, (Reported)


   Entered as Reported by: JULIE A IBEH on 3/28/22 1251


Pantoprazole Sodium (Protonix) 40 Mg Tablet.dr, 40 MG PO DAILY


   Prescribed by: JESSE SHORT on 4/1/22 1124


Varenicline Tartrate (Varenicline Tartrate) 1 Mg Tablet, 1 MG PO DAILY, 

(Reported)


   Entered as Reported by: JULIE A IBEH on 3/28/22 1251





Review of Systems


Constitutional:  no symptoms reported


EENTM:  no symptoms reported


Respiratory:  no symptoms reported


Cardiovascular:  no symptoms reported


Gastrointestinal:  no symptoms reported


Musculoskeletal:  joint pain, joint swelling


Skin:  no symptoms reported


Psychiatric/Neurological:  No Symptoms Reported





Past Medical-Social-Family Hx


Patient Social History


Tobacco Use?:  No


Use of E-Cig and/or Vaping dev:  Yes


E-Cig or Vaping type used:  Nicotine


Substance use?:  No


Alcohol Use?:  No


Pt feels they are or have been:  No





Immunizations Up To Date


Tetanus Booster (TDap):  Less than 5yrs


PED Vaccines UTD:  No


First/Initial COVID19 Vaccinat:  MARCH 2021


Second COVID19 Vaccination Lele:  APRIL 2021


Third COVID19 Vaccination Date:  NO


COVID19 Vaccine :  [employer]





Seasonal Allergies


Seasonal Allergies:  No





Past Medical History


Surgery/Hospitalization HX:  


HYPOTENSION, ANXIETY


Surgeries:  Yes


Appendectomy, Cystectomy, Gallbladder, Tubal Ligation


Respiratory:  No


Currently Using CPAP:  No


Currently Using BIPAP:  No


Cardiac:  No


Neurological:  No


Last Menstrual Period:  Jun 17, 2022


Reproductive Disorders:  No


Female Reproductive Disorders:  Ovarian Cyst


GYN History:  Tubal Ligation


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


HIV/AIDS:  No


Genitourinary:  No


Kidney Infection


Gastrointestinal:  No


Gall Bladder Disease


Musculoskeletal:  No


Endocrine:  No


HEENT:  No


Loss of Vision:  Denies


Hearing Impairment:  Denies


Cancer:  No


Psychosocial:  Yes


Anxiety, Depression


Integumentary:  No


Blood Disorders:  No


Adverse Reaction/Blood Tranf:  No





Family Medical History





Colon cancer


  GRANDFATHER


Diabetes mellitus


  19 MOTHER


  GRANDFATHER


  GRANDMOTHER


FH: breast cancer


  GRANDMOTHER


FH: depression


  19 FATHER


  19 MOTHER


FH: emphysema


  GRANDFATHER


FH: lung cancer


  GRANDMOTHER


FH: sleep apnea


  19 MOTHER


FH: stroke


  GRANDFATHER


Hepatitis C


  19 FATHER


Hypercholesterolemia


  19 MOTHER


  GRANDFATHER


  GRANDMOTHER


Hypertension


  19 FATHER


  19 MOTHER


  GRANDMOTHER


Myocardial infarction


  GRANDFATHER


Thyroid disease (HYPOTHYROIDISM)


  19 MOTHER


Heart Disease, Cancer, Diabetes, Hypertension





Physical Exam


Vital Signs





Vital Signs - First Documented








 6/24/22





 15:20


 


Temp 36.7


 


Pulse 60


 


Resp 18


 


B/P (MAP) 118/71 (87)





Capillary Refill :


Height, Weight, BMI


Height: 5'6.50"


Weight: 225lbs. 0.0oz. 102.403695ig; 27.82 BMI


Method:Stated


General Appearance:  WD/WN, no apparent distress


HEENT:  PERRL/EOMI


Neck:  non-tender, full range of motion


Cardiovascular:  regular rate, rhythm


Respiratory:  chest non-tender, lungs clear


Shoulder:  normal inspection


Elbow/Forearm:  normal inspection


Wrist:  Yes normal inspection


Hand:  normal ROM, stiffness (Tenderness to palpation to the dorsum of the right

third finger over the MCP joint)


Neurologic/Psychiatric:  CNs II-XII nml as tested, oriented x 3


Skin:  normal color


Lymphatic:  no adenopathy





Procedures/Interventions





   Suture Size:  4-0





Progress/Results/Core Measures


Results/Orders


My Orders





Orders - ARMANI OWENS


Hand, Right, 3 Views (6/24/22 15:44)


Ketorolac Injection (Toradol Injection) (6/24/22 15:45)





Medications Given in ED





Current Medications








 Medications  Dose


 Ordered  Sig/Kristin


 Route  Start Time


 Stop Time Status Last Admin


Dose Admin


 


 Ketorolac


 Tromethamine  30 mg  ONCE  ONCE


 IM  6/24/22 15:45


 6/24/22 15:46 DC 6/24/22 15:55


30 MG








Vital Signs/I&O











 6/24/22





 15:20


 


Temp 36.7


 


Pulse 60


 


Resp 18


 


B/P (MAP) 118/71 (87)














Blood Pressure Mean:                    87











Departure


Impression





   Primary Impression:  


   Tendinitis of finger of right hand


Disposition:  01 HOME, SELF-CARE


Condition:  Stable





Departure-Patient Inst.


Decision time for Depature:  16:31


Referrals:  


ASHLEIGH MIRZA MD (PCP/Family)


Primary Care Physician


Patient Instructions:  Tendinopathy (DC)





Add. Discharge Instructions:  


Patient placed in a finger splint and will be started on some steroids to see if

this helps with her symptoms.  I did recommend follow-up with primary care.





All discharge instructions reviewed with patient and/or family. Voiced 

understanding.


Scripts


Prednisone (Prednisone) 20 Mg Tab


20 MG PO BID for 5 Days, #10 TAB


   Take 3 tabs(60mg)daily, decrease by 1/2 tab(10mg)daily.


   Prov: ARMANI OWENS         6/24/22











ARMANI OWENS            Jun 24, 2022 16:34

## 2022-06-24 NOTE — DIAGNOSTIC IMAGING REPORT
CLINICAL HISTORY: Right middle finger pain.



COMPARISON: None.



TECHNIQUE: 3 views of the right hand.



FINDINGS: 

There is no acute fracture or dislocation of the right hand. 

Alignment is anatomic.  The imaged joint spaces are preserved. No

focal osseous lesions are identified.



IMPRESSION: 

1. No acute fracture or dislocation in the right hand.



Dictated by: 



  Dictated on workstation # [id number]

## 2023-04-16 NOTE — ED BACK PAIN
General


Chief Complaint:  Back Problems


Stated Complaint:  BACK PAIN


Nursing Triage Note:  


PT REPORTS TO ED WITH C/O BACK PAIN THAT STARTED AROUND 0230. PT DENIES KNOWN 


INJURY. PT STATES SHE DID DRIVE TWO LONG DRIVES. PT STATES SHE DROVE EIGHT HOURS




LAST WEEK. PT LEFT URINE SAMPLE THEN AMB. TO ROOM 06 WITH DISCOMFORT.


Source of Information:  Patient


Exam Limitations:  No Limitations


 (KELI VARELA)





History of Present Illness


Date Seen by Provider:  Apr 16, 2023


Time Seen by Provider:  18:42


Initial Comments


32-year-old female presents to the ED with complaints of left lower back pain 

that started on April 7 after she drove 8 hours to see her daughter.  She 

reports the pain improved over Easter weekend, but she had to drive back on 

Sunday which caused the pain to return.  She reports that pain was severe Monday

and Tuesday, better on Wednesday and Thursday, but then she was unable to get 

out of bed on Friday.  She reports that last night around 2:30 a.m. the pain 

became the worst.  She reports point tenderness to left lower back, states pain 

radiates down posterior gluteus into posterior thigh posterior calf and into the

foot.  She reports numbness and tingling in her left foot.  She reports she has 

taken 2000 mg of Tylenol, 2000 mg of naproxen, 750 mg of Advil, 1000 mg 

ibuprofen today.  Patient educated on appropriate dosages and not to take NSAIDs

together.  Patient denies saddle paresthesia, bowel or bladder retention or i

ncontinence, patient was able to walk to room.  She reports she has also had 2 

episodes of syncope today.  Reports that started with tunnel vision for 2 hours,

and she passed out.  She states she was not out for long. She reports history of

syncope due to painful stimulation.  She thinks the syncope is due to the pain. 

She states she has had similar back pain in the past, was diagnosed with 

sciatica.  She denies fevers, chest pain, shortness of air, abdominal pain, 

nausea, vomiting, dysuria.


 (KELI VARELA)





Allergies and Home Medications


Allergies


Coded Allergies:  


     No Known Drug Allergies (Verified , 4/15/20)





Patient Home Medication List


Home Medication List Reviewed:  Yes


 (KELI VARELA)


Acetaminophen (Tylenol Extra Strength) 500 Mg Tablet, 1,000 MG PO DAILY, 

(Reported)


   Entered as Reported by: JULIE A IBEH on 3/28/22 1251


Fexofenadine HCl (Allegra Allergy) 180 Mg Tablet, 180 MG PO DAILY, (Reported)


   Entered as Reported by: JULIE A IBEH on 3/28/22 1251


Methocarbamol (Methocarbamol) 500 Mg Tablet, 1,500 MG PO Q6-8HR


   Prescribed by: Keli Varela on 4/16/23 2035


Methylprednisolone (Methylprednisolone Dose Pack) 4 Mg Tab.ds.pk, 4 MG PO UD


   Prescribed by: Keli Varela on 4/16/23 2035


Naproxen (Naprosyn) 500 Mg Tablet, 1,000 MG PO DAILY, (Reported)


   Entered as Reported by: JULIE A IBEH on 3/28/22 1251


Pantoprazole Sodium (Protonix) 40 Mg Tablet.dr, 40 MG PO DAILY


   Prescribed by: JESSE SHORT on 4/1/22 1124


Prednisone (Prednisone) 20 Mg Tab, 20 MG PO BID


   Prescribed by: Darrell Weber on 6/24/22 1632


Varenicline Tartrate (Varenicline Tartrate) 1 Mg Tablet, 1 MG PO DAILY, 

(Reported)


   Entered as Reported by: JULIE A IBEH on 3/28/22 1251





Review of Systems


Constitutional:  see HPI (KELI VARELA)





Past Medical-Social-Family Hx


Patient Social History


Tobacco Use?:  No


Use of E-Cig and/or Vaping dev:  Yes


E-Cig or Vaping type used:  Nicotine


Use of E-Cig and/or Vaping Michele:  Current Everyday User, Light User


Substance use?:  Yes


Substance type:  Marijuana


Additional substance use comme:  LAST USED 4/14/23


Substance frequency:  Once in a while


Alcohol Use?:  No


Pt feels they are or have been:  No


 (KELI VARELA)





Immunizations Up To Date


Tetanus Booster (TDap):  Less than 5yrs


PED Vaccines UTD:  No


First/Initial COVID19 Vaccinat:  MARCH 2021


Second COVID19 Vaccination Lele:  APRIL 2021


Third COVID19 Vaccination Date:  MARCH 2021


 (KELI VARELA)





Seasonal Allergies


Seasonal Allergies:  No


 (KELI VARELA)





Past Medical History


Surgery/Hospitalization HX:  


HYPOTENSION, ANXIETY





SURG.-APPENDECTOMY, COLY., AND OVARIAN CYST


Surgeries:  Yes


Appendectomy, Cystectomy, Gallbladder, Tubal Ligation


Respiratory:  No


Currently Using CPAP:  No


Currently Using BIPAP:  No


Cardiac:  No


Neurological:  No


Last Menstrual Period:  Mar 25, 2023


Reproductive Disorders:  No


Female Reproductive Disorders:  Ovarian Cyst


GYN History:  Tubal Ligation


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


HIV/AIDS:  No


Genitourinary:  No


Kidney Infection


Gastrointestinal:  No


Gall Bladder Disease


Musculoskeletal:  No


Endocrine:  No


HEENT:  No


Loss of Vision:  Denies


Hearing Impairment:  Denies


Cancer:  No


Psychosocial:  Yes


Anxiety, Depression


Integumentary:  No


Blood Disorders:  No


Adverse Reaction/Blood Tranf:  No


 (KELI VARELA)





Family Medical History





Colon cancer


  GRANDFATHER


Diabetes mellitus


  19 MOTHER


  GRANDFATHER


  GRANDMOTHER


FH: breast cancer


  GRANDMOTHER


FH: depression


  19 FATHER


  19 MOTHER


FH: emphysema


  GRANDFATHER


FH: lung cancer


  GRANDMOTHER


FH: sleep apnea


  19 MOTHER


FH: stroke


  GRANDFATHER


Hepatitis C


  19 FATHER


Hypercholesterolemia


  19 MOTHER


  GRANDFATHER


  GRANDMOTHER


Hypertension


  19 FATHER


  19 MOTHER


  GRANDMOTHER


Myocardial infarction


  GRANDFATHER


Thyroid disease (HYPOTHYROIDISM)


  19 MOTHER


Heart Disease, Cancer, Diabetes, Hypertension


 (KELI VARELA)





Physical Exam


Vital Signs





Vital Signs - First Documented








 4/16/23





 18:37


 


Temp 36.6


 


Pulse 103


 


Resp 18


 


B/P (MAP) 118/76 (90)


 


Pulse Ox 100


 


O2 Delivery Room Air








 (LAWRENCE MARROQUIN MD)


Vital Signs


Capillary Refill : Less Than 3 Seconds 


 (KELI VARELA)


Height, Weight, BMI


Height: 5'6.50"


Weight: 225lbs. 0.0oz. 102.143101op; 22.00 BMI


Method:Stated


General Appearance:  No Apparent Distress, WD/WN


Neck:  Non Tender, Supple


Cardiovascular:  Regular Rate, Rhythm


Respiratory:  Lungs Clear, Normal Breath Sounds, No Accessory Muscle Use, No 

Respiratory Distress


Back:  No Vertebral Tenderness, Muscle Spasm (Left lower back point tenderness 

to palpation)


Extremity:  Normal Inspection, Normal Range of Motion


Neurologic/Psychiatric:  Alert, Normal Mood/Affect


Skin:  Normal Color, Warm/Dry (KELI VARELA)





Procedures/Interventions





   Suture Size:  4-0


 (KELI VARELA)





Progress/Results/Core Measures


Results/Orders


Lab Results





Laboratory Tests








Test


 4/16/23


18:38 4/16/23


19:40 Range/Units


 


 


Urine Color YELLOW    


 


Urine Clarity CLEAR    


 


Urine pH 6.0   5-9  


 


Urine Specific Gravity 1.025 H  1.016-1.022  


 


Urine Protein NEGATIVE   NEGATIVE  


 


Urine Glucose (UA) NEGATIVE   NEGATIVE  


 


Urine Ketones NEGATIVE   NEGATIVE  


 


Urine Nitrite NEGATIVE   NEGATIVE  


 


Urine Bilirubin NEGATIVE   NEGATIVE  


 


Urine Urobilinogen 0.2   < = 1.0  MG/DL


 


Urine Leukocyte Esterase NEGATIVE   NEGATIVE  


 


Urine RBC (Auto) NEGATIVE   NEGATIVE  


 


Urine RBC NONE    /HPF


 


Urine WBC 0-2    /HPF


 


Urine Squamous Epithelial


Cells 0-2 


 


  /HPF





 


Urine Crystals NONE    /LPF


 


Urine Bacteria FEW H   /HPF


 


Urine Casts NONE    /LPF


 


Urine Mucus NEGATIVE    /LPF


 


Urine Culture Indicated NO    


 


White Blood Count


 


 10.3 


 4.3-11.0


10^3/uL


 


Red Blood Count


 


 4.27 


 3.80-5.11


10^6/uL


 


Hemoglobin  12.3  11.5-16.0  g/dL


 


Hematocrit  38  35-52  %


 


Mean Corpuscular Volume  89  80-99  fL


 


Mean Corpuscular Hemoglobin  29  25-34  pg


 


Mean Corpuscular Hemoglobin


Concent 


 32 


 32-36  g/dL





 


Red Cell Distribution Width  13.7  10.0-14.5  %


 


Platelet Count


 


 215 


 130-400


10^3/uL


 


Mean Platelet Volume  10.6  9.0-12.2  fL


 


Immature Granulocyte % (Auto)  0   %


 


Neutrophils (%) (Auto)  64  42-75  %


 


Lymphocytes (%) (Auto)  27  12-44  %


 


Monocytes (%) (Auto)  7  0-12  %


 


Eosinophils (%) (Auto)  2  0-10  %


 


Basophils (%) (Auto)  0  0-10  %


 


Neutrophils # (Auto)


 


 6.6 


 1.8-7.8


10^3/uL


 


Lymphocytes # (Auto)


 


 2.7 


 1.0-4.0


10^3/uL


 


Monocytes # (Auto)


 


 0.7 


 0.0-1.0


10^3/uL


 


Eosinophils # (Auto)


 


 0.2 


 0.0-0.3


10^3/uL


 


Basophils # (Auto)


 


 0.0 


 0.0-0.1


10^3/uL


 


Immature Granulocyte # (Auto)


 


 0.0 


 0.0-0.1


10^3/uL


 


Sodium Level  140  135-145  MMOL/L


 


Potassium Level  3.6  3.6-5.0  MMOL/L


 


Chloride Level  107    MMOL/L


 


Carbon Dioxide Level  21  21-32  MMOL/L


 


Anion Gap  12  5-14  MMOL/L


 


Blood Urea Nitrogen  11  7-18  MG/DL


 


Creatinine


 


 0.83 


 0.60-1.30


MG/DL


 


Estimat Glomerular Filtration


Rate 


 95 


  





 


BUN/Creatinine Ratio  13   


 


Glucose Level  98    MG/DL


 


Calcium Level  8.8  8.5-10.1  MG/DL


 


Corrected Calcium  8.9  8.5-10.1  MG/DL


 


Magnesium Level  1.9  1.6-2.4  MG/DL


 


Total Bilirubin  0.4  0.1-1.0  MG/DL


 


Aspartate Amino Transf


(AST/SGOT) 


 13 


 5-34  U/L





 


Alanine Aminotransferase


(ALT/SGPT) 


 14 


 0-55  U/L





 


Alkaline Phosphatase  49    U/L


 


Total Protein  6.4  6.4-8.2  GM/DL


 


Albumin  3.9  3.2-4.5  GM/DL





 (LAWRENCE MARROQUIN MD)


Medications Given in ED





Current Medications








 Medications  Dose


 Ordered  Sig/Kristin


 Route  Start Time


 Stop Time Status Last Admin


Dose Admin


 


 Dexamethasone


 Sodium Phosphate  8 mg  ONCE  ONCE


 IV  4/16/23 19:15


 4/16/23 19:16 DC 4/16/23 19:47


8 MG


 


 Orphenadrine


 Citrate  60 mg  ONCE  ONCE


 IV  4/16/23 19:15


 4/16/23 19:16 DC 4/16/23 19:42


60 MG





 (LAWRENCE MARROQUIN MD)


Vital Signs/I&O











 4/16/23





 18:37


 


Temp 36.6


 


Pulse 103


 


Resp 18


 


B/P (MAP) 118/76 (90)


 


Pulse Ox 100


 


O2 Delivery Room Air





 (LAWRENCE MARROQUIN MD)


2





Blood Pressure Mean:                    90











Progress


Progress Note :  


   Time:  19:18


Progress Note


Patient seen and evaluated, resting in bed, no acute distress.  Based on exam 

and symptoms, this is likely sciatic nerve pain due to muscle spasms.  Will also

work-up for syncope including CBC, CMP, magnesium, EKG.  UA and urine pregnancy 

completed.  Urinalysis negative for infection or red blood cells.  Urine 

pregnancy negative.  Norflex, Decadron, IV fluids ordered.





2020 Labs and EKG reviewed. CBC, CMP, and magesium grossly normal. Results 

discussed with patient. Patient reports she is feeling better. Will discharge 

with medrol dose pack and methocarbamol. Discharge instructions and return 

precautions provided.


 (KELI VARELA)


Initial ECG Impression Date:  Apr 16, 2023


Initial ECG Impression Time:  19:55


Initial ECG Rate:  68


Initial ECG Rhythm:  Normal Sinus


Initial ECG Intervals:  Normal


Initial ECG Impression:  Normal


Initial ECG Comparisson:  Unchanged


Comment


Insignificant Q waves lead II, 3, aVF, V5, V6.  No ST elevation or T wave 

inversion.


 (KELI VARELA)





Departure


Impression





   Primary Impression:  


   Lumbago with sciatica, left side


   Qualified Codes:  M54.42 - Lumbago with sciatica, left side


Disposition:  01 HOME, SELF-CARE


Condition:  Stable





Departure-Patient Inst.


Decision time for Depature:  20:25


 (KELI VARELA)


Referrals:  


ASHLEIGH MIRZA MD (PCP/Family)


Primary Care Physician


Patient Instructions:  Sciatica





Add. Discharge Instructions:  


Take the Medrol Dosepak as prescribed.


Take 1 tablet of methocarbamol every 6 hours as needed for muscle spasms.


You may take 1000 mg of Tylenol every 8 hours as needed for pain.


You may take 500 mg of naproxen once every 12 hours with food as needed for 

pain.  Do not take ibuprofen or Advil if you are taking naproxen.


Follow-up with your primary care provider within 1 to 2 weeks.


Return for inability to walk, numbness or tingling in your inner thighs, bowel 

or bladder incontinence or retention, or any other new, concerning, or worsening

symptoms.





All discharge instructions reviewed with patient and/or family. Voiced 

understanding.


Scripts


Methylprednisolone (Methylprednisolone Dose Pack) 4 Mg Tab.ds.pk


4 MG PO UD for 6 Days, #21 PKG 0 Refills


   PER DOSE PACK INSTRUCTIONS


   Prov: KELI VARELA         4/16/23 


Methocarbamol (Methocarbamol) 500 Mg Tablet


1500 MG PO Q6-8HR for Back Pain, #28 TAB 0 Refills


   Prov: KELI VARELA         4/16/23


Work/School Note:  Work Release Form   Date Seen in the Emergency Department:  

Apr 16, 2023


   Return to Work:  Apr 19, 2023


   Restrictions:  No Restrictions


PHYSICIAN ATTESTATION NOTE:


I was present in the ER while NP / PA saw the patient, but I was not involved in

the  care, exam,  or management of the patient.


 (LAWRENCE MARROQUIN MD)











KELI VARELA APRN        Apr 16, 2023 19:18


LAWRENCE MARROQUIN MD              Apr 17, 2023 04:15

## 2023-05-26 NOTE — ED BACK PAIN
General


Chief Complaint:  Back Problems


Stated Complaint:  BACK PAIN


Nursing Triage Note:  


PT AMBULATES TO ROOM WITHOUT ASSISTANCE OF ER STAFF; PT A&OX4; PT ADVISES THAT 


SHE HAS A HISTORY OF SCIATICA AND THAT SHE HAS HAD MULTIPLE EPISODES OF FLARE 


UPS SINCE THAT TIME; PT HAS PREVIOUSLY BEEN SEEN IN THIS ER FOR SAME COMPLAINT; 


PT REPORTS THAT OVER THE PAST COUPLE OF DAYS THAT SYMPTOMS HAVE GRADUALLY BECOME




WORSE TO THE POINT THAT SHE CAME TO THE ER FOR EVALUATION


Source of Information:  Patient


Exam Limitations:  No Limitations


 (KELI VARELA)





History of Present Illness


Date Seen by Provider:  May 26, 2023


Time Seen by Provider:  22:03


Initial Comments


32-year-old female presents to the ER with complaints of left sciatic nerve 

pain.  She states she has had this same pain for long time.  Reports this recent

exacerbation started around Easter, but states it became worse today.  She 

reports shooting pain in her buttock down to her leg.  Reports intermittent 

numbness in her toes.  She has been taking her meloxicam and Tylenol, she has 

Flexeril but did not take any today.  She denies numbness or tingling in her 

groin or inner thighs and incontinence of bowel or bladder.  Patient is able to 

walk.


 (KELI VARELA)





Allergies and Home Medications


Allergies


Coded Allergies:  


     No Known Drug Allergies (Verified , 4/15/20)





Patient Home Medication List


Home Medication List Reviewed:  Yes


 (KELI VARELA)


Acetaminophen (Tylenol Extra Strength) 500 Mg Tablet, 1,000 MG PO DAILY, 

(Reported)


   Entered as Reported by: JULIE A IBEH on 3/28/22 1251


Cyclobenzaprine HCl (Cyclobenzaprine HCl) 10 Mg Tablet, 10 MG PO TID


   Prescribed by: Keli Varela on 5/26/23 2303


Fexofenadine HCl (Allegra Allergy) 180 Mg Tablet, 180 MG PO DAILY, (Reported)


   Entered as Reported by: JULIE A IBEH on 3/28/22 1251


Methocarbamol (Methocarbamol) 500 Mg Tablet, 1,500 MG PO Q6-8HR


   Prescribed by: Keli Varela on 4/16/23 2035


Methylprednisolone (Methylprednisolone Dose Pack) 4 Mg Tab.ds.pk, 4 MG PO UD


   Prescribed by: Keli Varela on 4/16/23 2035


Methylprednisolone (Methylprednisolone Dose Pack) 4 Mg Tab.ds.pk, 4 MG PO UD


   Prescribed by: Keli Varela on 5/26/23 2303


Naproxen (Naprosyn) 500 Mg Tablet, 1,000 MG PO DAILY, (Reported)


   Entered as Reported by: JULIE A IBEH on 3/28/22 1251


Pantoprazole Sodium (Protonix) 40 Mg Tablet.dr, 40 MG PO DAILY


   Prescribed by: JESSE SHORT on 4/1/22 1124


Prednisone (Prednisone) 20 Mg Tab, 20 MG PO BID


   Prescribed by: Darrell Weber on 6/24/22 1632


Varenicline Tartrate (Varenicline Tartrate) 1 Mg Tablet, 1 MG PO DAILY, 

(Reported)


   Entered as Reported by: JULIE A IBEH on 3/28/22 1251





Review of Systems


Constitutional:  see HPI (KELI VARELA)





Past Medical-Social-Family Hx


Patient Social History


Tobacco Use?:  No


Use of E-Cig and/or Vaping dev:  Yes


E-Cig or Vaping type used:  Nicotine


Use of E-Cig and/or Vaping Michele:  Current Everyday User


Substance use?:  Yes


Substance type:  Marijuana


Substance frequency:  Several times a month


Alcohol Use?:  No


Pt feels they are or have been:  Yes


 (KELI VARELA)





Immunizations Up To Date


Tetanus Booster (TDap):  Less than 5yrs


PED Vaccines UTD:  No


Influenza Vaccine Up-to-Date:  No; Not Current


First/Initial COVID19 Vaccinat:  MAY 2021


Second COVID19 Vaccination Lele:  JUNE 2021


Third COVID19 Vaccination Date:  MARCH 2021


 (KELI VARELA)





Seasonal Allergies


Seasonal Allergies:  No


 (KELI VARELA)





Past Medical History


Surgery/Hospitalization HX:  


TUBAL/WISDOM TEETH/APPENDECTOMY/CHOLECYSTECTOMY


Surgeries:  Yes


Appendectomy, Cystectomy, Gallbladder, Tubal Ligation


Respiratory:  No


Currently Using CPAP:  No


Currently Using BIPAP:  No


Cardiac:  No


Neurological:  No


Last Menstrual Period:  May 22, 2023


Reproductive Disorders:  No


Female Reproductive Disorders:  Ovarian Cyst


GYN History:  Tubal Ligation


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


HIV/AIDS:  No


Genitourinary:  No


Kidney Infection


Gastrointestinal:  No


Gall Bladder Disease


Musculoskeletal:  No


Endocrine:  No


HEENT:  No


Loss of Vision:  Denies


Hearing Impairment:  Denies


Cancer:  No


Psychosocial:  Yes


Anxiety, Depression


Integumentary:  No


Blood Disorders:  No


Adverse Reaction/Blood Tranf:  No


 (KELI VARELA)





Family Medical History





Colon cancer


  GRANDFATHER


Diabetes mellitus


  19 MOTHER


  GRANDFATHER


  GRANDMOTHER


FH: breast cancer


  GRANDMOTHER


FH: depression


  19 FATHER


  19 MOTHER


FH: emphysema


  GRANDFATHER


FH: lung cancer


  GRANDMOTHER


FH: sleep apnea


  19 MOTHER


FH: stroke


  GRANDFATHER


Hepatitis C


  19 FATHER


Hypercholesterolemia


  19 MOTHER


  GRANDFATHER


  GRANDMOTHER


Hypertension


  19 FATHER


  19 MOTHER


  GRANDMOTHER


Myocardial infarction


  GRANDFATHER


Thyroid disease (HYPOTHYROIDISM)


  19 MOTHER


Heart Disease, Cancer, Diabetes, Hypertension


 (KELI VARELA)





Physical Exam


Vital Signs





Vital Signs - First Documented








 5/26/23





 21:29


 


Temp 36.8


 


Pulse 83


 


Resp 16


 


B/P (MAP) 105/70 (82)


 


Pulse Ox 98


 


O2 Delivery Room Air








 (ANGEL WASHINGTON MD)


Vital Signs


Capillary Refill : Less Than 3 Seconds 


 (KELI VARELA)


Height, Weight, BMI


Height: 5'6.50"


Weight: 225lbs. 0.0oz. 102.624946tm; 23.00 BMI


Method:Stated


General Appearance:  No Apparent Distress, WD/WN


Neck:  Non Tender, Supple


Cardiovascular:  Regular Rate, Rhythm


Respiratory:  Lungs Clear, Normal Breath Sounds, No Accessory Muscle Use, No 

Respiratory Distress


Back:  Vertebral Tenderness (Lumbar tenderness on left side), Other (Muscle 

tenderness on left side)


Extremity:  Normal Inspection, Normal Range of Motion


Neurologic/Psychiatric:  Alert, Normal Mood/Affect


Skin:  Normal Color, Warm/Dry (KELI VARELA)





Procedures/Interventions





   Suture Size:  4-0


 (KELI VARELA)





Progress/Results/Core Measures


Results/Orders


Vital Signs/I&O











 5/26/23 5/26/23





 21:29 23:12


 


Temp 36.8 


 


Pulse 83 81


 


Resp 16 16


 


B/P (MAP) 105/70 (82) 101/71


 


Pulse Ox 98 98


 


O2 Delivery Room Air Room Air





 (ANGEL WASHINGTON MD)








Blood Pressure Mean:                    82











Progress


Progress Note :  


Progress Note


Patient seen and evaluated, sitting comfortably in chair, no acute distress.  

Based on exam and symptoms, Toradol, Norflex, Decadron ordered.  Will discharge 

with prescription for Medrol Dosepak and new prescription for Flexeril since 

patient only has 1 pill left.





Patient was here with her son, during her visit she had a syncopal episode.  She

states she was watching her son have his laceration cleaned, and the thought of 

him having pain caused her to pass out.  She states that this has happened to 

her in the past with her children.  Patient quickly came to after she passed 

out.  She did hit her head, small abrasion noted to left side of forehead.  Seem

to be a vasovagal syncope, blood pressure was decreased after she passed out.  

She was quickly able to get up and return to baseline.





Patient was comfortable going home after she drank some water.  Discharge 

instructions and return precautions provided.


 (KELI VARELA)





Departure


Impression





   Primary Impression:  


   Lumbago with sciatica, left side


Disposition:  01 HOME, SELF-CARE


Condition:  Stable





Departure-Patient Inst.


Decision time for Depature:  23:02


 (KELI VARELA)


Referrals:  


ASHLEIGH MIRZA MD (PCP/Family)


Primary Care Physician


Patient Instructions:  Sciatica





Add. Discharge Instructions:  


Take Medrol Dosepak as prescribed.


Take Flexeril as needed for muscle spasms.


Continue taking your meloxicam as prescribed.


Return for inability to walk, numbness or tingling in your groin or inner 

thighs, bowel or bladder incontinence, or any other new, concerning, or 

worsening symptoms.





All discharge instructions reviewed with patient and/or family. Voiced 

understanding.


Scripts


Methylprednisolone (Methylprednisolone Dose Pack) 4 Mg Tab.ds.pk


4 MG PO UD for 6 Days, #21 PKG 0 Refills


   PER DOSE PACK INSTRUCTIONS


   Prov: KELI VARELA         5/26/23 


Cyclobenzaprine HCl (Cyclobenzaprine HCl) 10 Mg Tablet


10 MG PO TID, #21 TAB 0 Refills


   Prov: KELI VARELA         5/26/23








ATTENDING PHYSICIAN NOTE:


I was physically present as attending physician in the emergency department 

during the care of this patient, but I was not directly involved in the decision

making or delivery of care for this patient. 


 (ANGEL WASHINGTON MD)











KELI VARELA        May 26, 2023 23:03


ANGEL WASHINGTON MD        May 28, 2023 13:28

## 2025-06-03 NOTE — XMS REPORT
Continuity of Care Document

 Created on: 2016



FARHAD SIMS

External Reference #: [id number]

: 1990

Sex: Female



Demographics







 Address  [address] [place]  [address]

 

 Home Phone  [phone]

 

 Preferred Language  English

 

 Marital Status  Unknown

 

 Shinto Affiliation  Unknown

 

 Race  Unknown

 

 Ethnic Group  Unknown





Author







 Author  [facility]

 

 Organization  [facility]

 

 Address  Unknown

 

 Phone  Unavailable







Support







 Name  Relationship  Address  Phone

 

 EVAN ZACARIAS  Caregiver  [facility]

 [address] [place]  [address] [phone]

 

 MARCELO OLIVEIRA MD  Caregiver  [address] [place]  [address]  [phone]

 

 NO, LOCAL PHYSICIAN  Caregiver  Unknown  Unavailable

 

 FREDIS RUIZ DO  Caregiver  [facility] EMERGENCY DEPT

[place]  [address] [phone]

 

 IVY BURRIS  Next Of Kin  [address] [place]  [address] [phone]







Care Team Providers







 Care Team Member Name  Role  Phone

 

 MARCELO OLIVEIRA MD  PCP  [phone]







Insurance Providers







 Payer Name  Policy Number  Subscriber Name  Relationship

 

 Self Pay     Farhad Sims  18 Self / Same As Patient







Advance Directives







 Directive  Response  Recorded Date/Time

 

 Advance Directives  No  08/15/16 8:25pm

 

 Health Care Power of   No  08/15/16 8:25pm







Chief Complaint and Reason for Visit







 Chief Complaint  Cough/Cold/Flu Symptoms

 

 Reason for Visit  Upper respiratory infection







Problems

Active Problems







 Medical Problem  Onset Date  Status

 

 Lower back pain  Unknown  Acute

 

 Pelvic pain  Unknown  Acute

 

 Upper respiratory infection  Unknown  Acute

 

 Urinary tract infection  Unknown  Acute







Medications

Current Home Medications







 Medication  Dose  Units  Route  Directions  Days/Qty  Instructions  Start Date

 

 Sertraline Hcl 50 Mg                    08

 

 [Implanon ]                    08

 

 Cephalexin 500 Mg  500  Mg  Oral  Four Times Daily  28     08/15/16

 

 Azithromycin 250 Mg  250  Mg  Oral  As Directed  6  2 tablets by mouth on day 1
, then 1 tablet daily on each of the following 4 days  16

 

 Diphenhydramine Hcl 25 Mg  25  Mg  Oral  Every 6 Hours as needed for 
Congestion  30     16







Social History







 Social History Problem  Response  Recorded Date/Time

 

 Alcohol Use  Denies Use  2016 2:33pm

 

 Recreational Drug Use  No  2016 2:33pm

 

 Recent Foreign Travel  No  2016 7:38pm

 

 Recent Infectious Disease Exposure  No  2016 7:38pm

 

 Hospitalization with Isolation  Denies  2016 7:38pm

 

 Type Used  Cigarettes  08/15/2016 8:25pm

 

 Recent Hopitalizations  No  2016 7:41pm

 

 Hospitalization with Isolation  Denies  2016 7:38pm







Hospital Discharge Instructions

No hospital discharge instructions.



Plan of Care







 Discharge Date  16 8:16pm

 

 Disposition  01 HOME, SELF-CARE

 

 Condition at Discharge  Improved

 

 Instructions/Education Provided  Upper Respiratory Infection (ED)

 

 Prescriptions  See Medication Section

 

 Referrals  MARCELO OLIVEIRA MD - Primary Care Physician





NO,LOCAL PHYSICIAN - Primary Care Physician

 

 Additional Instructions/Education  1.  Medication as directed  

 2.  Follow-up with your obstetrician next week  

 3.  All discharge instructions reviewed with patient and/or family. Voiced 

understanding.







Functional Status

No functional status results.



Allergies, Adverse Reactions, Alerts

No known allergies.



Immunizations

No immunization records.



Vital Signs

Acute Vital Signs





 Vital  Response  Date/Time

 

 Temperature (Fahrenheit)  98.9 degrees F (97.6 - 99.5)  08/15/2016 9:22pm

 

 Temperature (Calculated Celsius)  37.84136 degrees C (36.4 - 37.5)  08/15/2016 
9:22pm

 

 Temperature Source  Temporal  08/15/2016 9:22pm

 

 Pulse Rate (adult)  101 bpm (60 - 90)  2016 7:38pm

 

 Respiratory Rate  18 bpm (12 - 24)  2016 7:38pm

 

 O2 Sat by Pulse Oximetry  99 % (88 - 100)  2016 7:38pm

 

 Blood Pressure  126/80 mm Hg  2016 7:38pm

 

    Blood Pressure Mean  95 mm Hg  2016 7:38pm

 

 Pain      

 

    Numeric Pain Scale  4  2016 7:38pm

 

 Height (Feet)  5 feet  2016 7:38pm

 

 Height (Inches)  6 inches  2016 7:38pm

 

 Height (Calculated Centimeters)  167.298316 cm  2016 7:38pm

 

 Weight (Pounds)  164 pounds  2016 7:38pm

 

 Weight (Calculated Grams)  99686.780 gm  08/15/2016 8:25pm

 

 Weight (Calculated Kilograms)  74.872191 kilograms  2016 7:38pm

 

 Capillary Refill      

 

    Capillary Refill  Less Than 3 Seconds  2016 7:38pm

 

 Height  5 ft 6 in   

 

 Weight  164 lb   

 

 Body Mass Index  26.5 kg/m^2   







Results

Laboratory Results







 Test Name  Result  Units  Flags  Reference  Collection Date/Time  Result Date/
Time  Comments

 

 Urine Color  YELLOW           08/15/2016 8:24pm  08/15/2016 8:44pm   

 

 Urine Clarity  CLEAR           08/15/2016 8:24pm  08/15/2016 8:44pm   

 

 Urine pH  7        5-9  08/15/2016 8:24pm  08/15/2016 8:44pm   

 

 Urine Specific Gravity  1.005     *  1.016-1.022  08/15/2016 8:24pm  08/15/
2016 8:44pm   

 

 Urine Protein  1+     *  NEGATIVE  08/15/2016 8:24pm  08/15/2016 8:44pm   

 

 Urine Glucose (UA)  NEGATIVE        NEGATIVE  08/15/2016 8:24pm  08/15/2016 8:
44pm   

 

 Urine RBC (Auto)  5+     *  NEGATIVE  08/15/2016 8:24pm  08/15/2016 8:44pm   

 

 Urine Ketones  NEGATIVE        NEGATIVE  08/15/2016 8:24pm  08/15/2016 8:44pm 
  

 

 Urine Nitrite  NEGATIVE        NEGATIVE  08/15/2016 8:24pm  08/15/2016 8:44pm 
  

 

 Urine Bilirubin  NEGATIVE        NEGATIVE  08/15/2016 8:24pm  08/15/2016 8:
44pm   

 

 Urine Urobilinogen  NORMAL  MG/DL     NORMAL  08/15/2016 8:24pm  08/15/2016 8:
44pm   

 

 Urine Leukocyte Esterase  3+     *  NEGATIVE  08/15/2016 8:24pm  08/15/2016 8:
44pm   

 

 Urine RBC  2-5  /HPF  *     08/15/2016 8:24pm  08/15/2016 8:44pm   

 

 Urine WBC  25-50  /HPF  *     08/15/2016 8:24pm  08/15/2016 8:44pm   

 

 Urine Bacteria  TRACE  /HPF        08/15/2016 8:24pm  08/15/2016 8:44pm   

 

 Urine Squamous Epithelial Cells  0-2  /HPF        08/15/2016 8:24pm  08/15/
2016 8:44pm   

 

 Urine Crystals  NONE  /LPF        08/15/2016 8:24pm  08/15/2016 8:44pm   

 

 Urine Casts  NONE  /LPF        08/15/2016 8:24pm  08/15/2016 8:44pm   

 

 Urine Mucus  NEGATIVE  /LPF        08/15/2016 8:24pm  08/15/2016 8:44pm   

 

 Urine Culture Indicated  YES           08/15/2016 8:24pm  08/15/2016 8:44pm   





Microbiology Results







 Procedure  Source  Result  Collection Date/Time  Result Date/Time

 

 Urine Culture  Urine, Clean Catch  PROTEUS MIRABILIS  08/15/2016 8:24pm  2016 1:28pm







Procedures

No known history of procedures.



Encounters







 Encounter  Location  Arrival/Admit Date  Discharge/Depart Date  Attending 
Provider

 

 Departed Emergency Room  [facility]  16 7:08pm   8:16pm  EVAN ZACARIAS

 

 Departed Emergency Room  [facility]  08/15/16 8:19pm  08/15
/16 9:22pm  ANGEL WASHINGTON MD











 Recent Diagnosis discharge planning